# Patient Record
Sex: MALE | Race: WHITE | NOT HISPANIC OR LATINO
[De-identification: names, ages, dates, MRNs, and addresses within clinical notes are randomized per-mention and may not be internally consistent; named-entity substitution may affect disease eponyms.]

---

## 2017-01-03 ENCOUNTER — APPOINTMENT (OUTPATIENT)
Dept: RHEUMATOLOGY | Facility: CLINIC | Age: 62
End: 2017-01-03

## 2017-01-03 ENCOUNTER — LABORATORY RESULT (OUTPATIENT)
Age: 62
End: 2017-01-03

## 2017-01-03 VITALS
DIASTOLIC BLOOD PRESSURE: 60 MMHG | SYSTOLIC BLOOD PRESSURE: 100 MMHG | HEIGHT: 70 IN | OXYGEN SATURATION: 95 % | HEART RATE: 86 BPM | WEIGHT: 214 LBS | BODY MASS INDEX: 30.64 KG/M2

## 2017-01-05 ENCOUNTER — INPATIENT (INPATIENT)
Facility: HOSPITAL | Age: 62
LOS: 4 days | Discharge: HOME | DRG: 286 | End: 2017-01-10
Attending: INTERNAL MEDICINE | Admitting: HOSPITALIST
Payer: MEDICARE

## 2017-01-05 VITALS
SYSTOLIC BLOOD PRESSURE: 118 MMHG | OXYGEN SATURATION: 98 % | RESPIRATION RATE: 18 BRPM | HEART RATE: 82 BPM | DIASTOLIC BLOOD PRESSURE: 77 MMHG | TEMPERATURE: 98 F

## 2017-01-05 DIAGNOSIS — R07.9 CHEST PAIN, UNSPECIFIED: ICD-10-CM

## 2017-01-05 DIAGNOSIS — I20.9 ANGINA PECTORIS, UNSPECIFIED: ICD-10-CM

## 2017-01-05 DIAGNOSIS — I50.23 ACUTE ON CHRONIC SYSTOLIC (CONGESTIVE) HEART FAILURE: ICD-10-CM

## 2017-01-05 DIAGNOSIS — M06.9 RHEUMATOID ARTHRITIS, UNSPECIFIED: ICD-10-CM

## 2017-01-05 DIAGNOSIS — I51.3 INTRACARDIAC THROMBOSIS, NOT ELSEWHERE CLASSIFIED: ICD-10-CM

## 2017-01-05 DIAGNOSIS — I73.9 PERIPHERAL VASCULAR DISEASE, UNSPECIFIED: ICD-10-CM

## 2017-01-05 LAB
25(OH)D3 SERPL-MCNC: 39.8 NG/ML
ADJUSTED MITOGEN: >10 IU/ML
ADJUSTED TB AG: 0.01 IU/ML
ALBUMIN SERPL ELPH-MCNC: 4 G/DL
ALBUMIN SERPL ELPH-MCNC: 4.4 G/DL — SIGNIFICANT CHANGE UP (ref 3.3–5)
ALP BLD-CCNC: 65 U/L
ALP SERPL-CCNC: 67 U/L — SIGNIFICANT CHANGE UP (ref 40–120)
ALT FLD-CCNC: 37 U/L RC — SIGNIFICANT CHANGE UP (ref 10–45)
ALT SERPL-CCNC: 25 U/L
ANION GAP SERPL CALC-SCNC: 15 MMOL/L
ANION GAP SERPL CALC-SCNC: 15 MMOL/L — SIGNIFICANT CHANGE UP (ref 5–17)
APTT BLD: 36.2 SEC — SIGNIFICANT CHANGE UP (ref 27.5–37.4)
AST SERPL-CCNC: 23 U/L
AST SERPL-CCNC: 31 U/L — SIGNIFICANT CHANGE UP (ref 10–40)
BASOPHILS # BLD AUTO: 0 K/UL — SIGNIFICANT CHANGE UP (ref 0–0.2)
BASOPHILS # BLD AUTO: 0.19 K/UL
BASOPHILS NFR BLD AUTO: 1 % — SIGNIFICANT CHANGE UP (ref 0–2)
BASOPHILS NFR BLD AUTO: 1.8 %
BILIRUB SERPL-MCNC: 1 MG/DL
BILIRUB SERPL-MCNC: 1 MG/DL — SIGNIFICANT CHANGE UP (ref 0.2–1.2)
BLD GP AB SCN SERPL QL: NEGATIVE — SIGNIFICANT CHANGE UP
BUN SERPL-MCNC: 20 MG/DL — SIGNIFICANT CHANGE UP (ref 7–23)
BUN SERPL-MCNC: 23 MG/DL
CALCIUM SERPL-MCNC: 8.7 MG/DL — SIGNIFICANT CHANGE UP (ref 8.4–10.5)
CALCIUM SERPL-MCNC: 8.8 MG/DL
CHLORIDE SERPL-SCNC: 100 MMOL/L — SIGNIFICANT CHANGE UP (ref 96–108)
CHLORIDE SERPL-SCNC: 103 MMOL/L
CK MB BLD-MCNC: 2.1 % — SIGNIFICANT CHANGE UP (ref 0–3.5)
CK MB CFR SERPL CALC: 3.5 NG/ML — SIGNIFICANT CHANGE UP (ref 0–6.7)
CK SERPL-CCNC: 157 U/L
CK SERPL-CCNC: 168 U/L — SIGNIFICANT CHANGE UP (ref 30–200)
CO2 SERPL-SCNC: 23 MMOL/L
CO2 SERPL-SCNC: 23 MMOL/L — SIGNIFICANT CHANGE UP (ref 22–31)
CREAT SERPL-MCNC: 0.82 MG/DL — SIGNIFICANT CHANGE UP (ref 0.5–1.3)
CREAT SERPL-MCNC: 0.86 MG/DL
CRP SERPL-MCNC: 0.85 MG/DL
EOSINOPHIL # BLD AUTO: 0.1 K/UL — SIGNIFICANT CHANGE UP (ref 0–0.5)
EOSINOPHIL # BLD AUTO: 0.19 K/UL
EOSINOPHIL NFR BLD AUTO: 1 % — SIGNIFICANT CHANGE UP (ref 0–6)
EOSINOPHIL NFR BLD AUTO: 1.8 %
ERYTHROCYTE [SEDIMENTATION RATE] IN BLOOD BY WESTERGREN METHOD: 21 MM/HR
GAS PNL BLDV: SIGNIFICANT CHANGE UP
GLUCOSE SERPL-MCNC: 118 MG/DL — HIGH (ref 70–99)
GLUCOSE SERPL-MCNC: 87 MG/DL
HCT VFR BLD CALC: 40.6 % — SIGNIFICANT CHANGE UP (ref 39–50)
HCT VFR BLD CALC: 41 %
HGB BLD-MCNC: 13.4 G/DL
HGB BLD-MCNC: 13.9 G/DL — SIGNIFICANT CHANGE UP (ref 13–17)
INR BLD: 2.42 RATIO — HIGH (ref 0.88–1.16)
LYMPHOCYTES # BLD AUTO: 0.86 K/UL
LYMPHOCYTES # BLD AUTO: 11 % — LOW (ref 13–44)
LYMPHOCYTES # BLD AUTO: 2 K/UL — SIGNIFICANT CHANGE UP (ref 1–3.3)
LYMPHOCYTES NFR BLD AUTO: 8 %
M TB IFN-G BLD-IMP: NEGATIVE
MAN DIFF?: NORMAL
MCHC RBC-ENTMCNC: 29.3 PG
MCHC RBC-ENTMCNC: 31.3 PG — SIGNIFICANT CHANGE UP (ref 27–34)
MCHC RBC-ENTMCNC: 32.7 GM/DL
MCHC RBC-ENTMCNC: 34.3 GM/DL — SIGNIFICANT CHANGE UP (ref 32–36)
MCV RBC AUTO: 89.5 FL
MCV RBC AUTO: 91.4 FL — SIGNIFICANT CHANGE UP (ref 80–100)
MONOCYTES # BLD AUTO: 1.1 K/UL — HIGH (ref 0–0.9)
MONOCYTES # BLD AUTO: 1.53 K/UL
MONOCYTES NFR BLD AUTO: 14.2 %
MONOCYTES NFR BLD AUTO: 7 % — SIGNIFICANT CHANGE UP (ref 2–14)
NEUTROPHILS # BLD AUTO: 7.8 K/UL
NEUTROPHILS # BLD AUTO: SIGNIFICANT CHANGE UP (ref 1.8–7.4)
NEUTROPHILS NFR BLD AUTO: 72 % — SIGNIFICANT CHANGE UP (ref 43–77)
NEUTROPHILS NFR BLD AUTO: 72.4 %
NT-PROBNP SERPL-SCNC: 184 PG/ML — SIGNIFICANT CHANGE UP (ref 0–300)
PLATELET # BLD AUTO: 227 K/UL
PLATELET # BLD AUTO: 229 K/UL — SIGNIFICANT CHANGE UP (ref 150–400)
POTASSIUM SERPL-MCNC: 4.6 MMOL/L — SIGNIFICANT CHANGE UP (ref 3.5–5.3)
POTASSIUM SERPL-SCNC: 4.6 MMOL/L — SIGNIFICANT CHANGE UP (ref 3.5–5.3)
POTASSIUM SERPL-SCNC: 5 MMOL/L
PROT SERPL-MCNC: 6.6 G/DL
PROT SERPL-MCNC: 7 G/DL — SIGNIFICANT CHANGE UP (ref 6–8.3)
PROTHROM AB SERPL-ACNC: 26.4 SEC — HIGH (ref 10–13.1)
QUANTIFERON GOLD NIL: 0.06 IU/ML
RBC # BLD: 4.44 M/UL — SIGNIFICANT CHANGE UP (ref 4.2–5.8)
RBC # BLD: 4.58 M/UL
RBC # FLD: 14.3 % — SIGNIFICANT CHANGE UP (ref 10.3–14.5)
RBC # FLD: 16.5 %
RH IG SCN BLD-IMP: POSITIVE — SIGNIFICANT CHANGE UP
SODIUM SERPL-SCNC: 138 MMOL/L — SIGNIFICANT CHANGE UP (ref 135–145)
SODIUM SERPL-SCNC: 141 MMOL/L
TROPONIN T SERPL-MCNC: <0.01 NG/ML — SIGNIFICANT CHANGE UP (ref 0–0.06)
TROPONIN T SERPL-MCNC: <0.01 NG/ML — SIGNIFICANT CHANGE UP (ref 0–0.06)
WBC # BLD: 8.6 K/UL — SIGNIFICANT CHANGE UP (ref 3.8–10.5)
WBC # FLD AUTO: 10.77 K/UL
WBC # FLD AUTO: 8.6 K/UL — SIGNIFICANT CHANGE UP (ref 3.8–10.5)

## 2017-01-05 PROCEDURE — 99285 EMERGENCY DEPT VISIT HI MDM: CPT | Mod: 25

## 2017-01-05 PROCEDURE — 71010: CPT | Mod: 26

## 2017-01-05 PROCEDURE — 99223 1ST HOSP IP/OBS HIGH 75: CPT

## 2017-01-05 PROCEDURE — 93010 ELECTROCARDIOGRAM REPORT: CPT

## 2017-01-05 RX ORDER — SPIRONOLACTONE 25 MG/1
25 TABLET, FILM COATED ORAL DAILY
Qty: 0 | Refills: 0 | Status: DISCONTINUED | OUTPATIENT
Start: 2017-01-05 | End: 2017-01-10

## 2017-01-05 RX ORDER — DIGOXIN 250 MCG
0.12 TABLET ORAL DAILY
Qty: 0 | Refills: 0 | Status: DISCONTINUED | OUTPATIENT
Start: 2017-01-05 | End: 2017-01-10

## 2017-01-05 RX ORDER — ASPIRIN/CALCIUM CARB/MAGNESIUM 324 MG
325 TABLET ORAL ONCE
Qty: 0 | Refills: 0 | Status: COMPLETED | OUTPATIENT
Start: 2017-01-05 | End: 2017-01-05

## 2017-01-05 RX ORDER — TICAGRELOR 90 MG/1
90 TABLET ORAL
Qty: 0 | Refills: 0 | Status: DISCONTINUED | OUTPATIENT
Start: 2017-01-05 | End: 2017-01-10

## 2017-01-05 RX ORDER — SODIUM CHLORIDE 9 MG/ML
3 INJECTION INTRAMUSCULAR; INTRAVENOUS; SUBCUTANEOUS ONCE
Qty: 0 | Refills: 0 | Status: COMPLETED | OUTPATIENT
Start: 2017-01-05 | End: 2017-01-05

## 2017-01-05 RX ORDER — FOLIC ACID 0.8 MG
1 TABLET ORAL DAILY
Qty: 0 | Refills: 0 | Status: DISCONTINUED | OUTPATIENT
Start: 2017-01-05 | End: 2017-01-10

## 2017-01-05 RX ORDER — METHOTREXATE 2.5 MG/1
17.5 TABLET ORAL
Qty: 0 | Refills: 0 | Status: DISCONTINUED | OUTPATIENT
Start: 2017-01-09 | End: 2017-01-10

## 2017-01-05 RX ORDER — ROSUVASTATIN CALCIUM 5 MG/1
40 TABLET ORAL AT BEDTIME
Qty: 0 | Refills: 0 | Status: DISCONTINUED | OUTPATIENT
Start: 2017-01-05 | End: 2017-01-10

## 2017-01-05 RX ORDER — WARFARIN SODIUM 2.5 MG/1
6 TABLET ORAL ONCE
Qty: 0 | Refills: 0 | Status: COMPLETED | OUTPATIENT
Start: 2017-01-05 | End: 2017-01-05

## 2017-01-05 RX ORDER — CARVEDILOL PHOSPHATE 80 MG/1
6.25 CAPSULE, EXTENDED RELEASE ORAL EVERY 12 HOURS
Qty: 0 | Refills: 0 | Status: DISCONTINUED | OUTPATIENT
Start: 2017-01-05 | End: 2017-01-10

## 2017-01-05 RX ORDER — LISINOPRIL 2.5 MG/1
5 TABLET ORAL DAILY
Qty: 0 | Refills: 0 | Status: DISCONTINUED | OUTPATIENT
Start: 2017-01-05 | End: 2017-01-10

## 2017-01-05 RX ORDER — ISOSORBIDE MONONITRATE 60 MG/1
1 TABLET, EXTENDED RELEASE ORAL
Qty: 0 | Refills: 0 | COMMUNITY

## 2017-01-05 RX ORDER — FUROSEMIDE 40 MG
40 TABLET ORAL
Qty: 0 | Refills: 0 | Status: DISCONTINUED | OUTPATIENT
Start: 2017-01-05 | End: 2017-01-08

## 2017-01-05 RX ORDER — ISOSORBIDE MONONITRATE 60 MG/1
60 TABLET, EXTENDED RELEASE ORAL DAILY
Qty: 0 | Refills: 0 | Status: DISCONTINUED | OUTPATIENT
Start: 2017-01-05 | End: 2017-01-06

## 2017-01-05 RX ADMIN — CARVEDILOL PHOSPHATE 6.25 MILLIGRAM(S): 80 CAPSULE, EXTENDED RELEASE ORAL at 19:53

## 2017-01-05 RX ADMIN — SODIUM CHLORIDE 3 MILLILITER(S): 9 INJECTION INTRAMUSCULAR; INTRAVENOUS; SUBCUTANEOUS at 12:22

## 2017-01-05 RX ADMIN — Medication 40 MILLIGRAM(S): at 19:53

## 2017-01-05 RX ADMIN — Medication 325 MILLIGRAM(S): at 13:27

## 2017-01-05 RX ADMIN — WARFARIN SODIUM 6 MILLIGRAM(S): 2.5 TABLET ORAL at 21:42

## 2017-01-05 RX ADMIN — ROSUVASTATIN CALCIUM 40 MILLIGRAM(S): 5 TABLET ORAL at 21:42

## 2017-01-05 RX ADMIN — TICAGRELOR 90 MILLIGRAM(S): 90 TABLET ORAL at 21:42

## 2017-01-05 NOTE — ED PROVIDER NOTE - MUSCULOSKELETAL, MLM
Spine appears normal, range of motion is not limited, no muscle or joint tenderness Spine appears normal, range of motion is limited, no muscle or joint tenderness advanced changes of RA

## 2017-01-05 NOTE — ED PROVIDER NOTE - OBJECTIVE STATEMENT
61M w/ hx CHF  EF 20%, AICD, HTN, HLD, GERD, Rheumatoid arthritis (now on methotrexate), PAD/intermittent claudication, CAD and MI w/9  stents, anterior STEMI c/b cardiogenic shock req restent LAD (in-stent thrombosis) and Impella (9/11/2011), hx of LV thrombus (tx for 3 mo w/warfarin) , 4/15 pt had NSTEMI with RODRIGO to  pCX with Impella support. Pt comes o ED c.o 3 week hx of left sided chest pain which radiates to b/l arms x 3 weeks. Pian usually occurs at rest but sometimes on exertion. Pain lasts for 7-10 minutes and occurs usually form 12 am to 8 am . Pt denies any current chest pain . Pt also reports increase pedal edema x 3 weeks . Denies any n/v, diaphoresis, ab pain, brbpr/ melena, f/c/s, uri sx or any illness. Denies any recent travel /surg /immobilization .

## 2017-01-05 NOTE — ED PROVIDER NOTE - PSH
Accommodative strabismus    AICD (automatic cardioverter/defibrillator) present  EF 29%  S/P angioplasty with stent  2008 - 5 stents [facility]  2011 - LAD stent with cardiogenic shock Impella assisted  4/2015 - pLAD RODRIGO/POBA and pCx RODRIGO x 1  Testicular torsion  bilateral testes present

## 2017-01-05 NOTE — H&P ADULT. - PROBLEM SELECTOR PLAN 1
UA - patient with significant 3V dz s/p PCI with Impella support now with CP likely UA  c/w Ticagrelor, coumadin  c/w ACE, statin, spironolactone, bb  d/w cards - consult appreciated - will Increase imdur to 60mg bid  Plan for cath tomorrow UA - patient with significant 3V dz s/p PCI with Impella support now with CP likely UA  c/w Ticagrelor, coumadin  c/w ACE, statin, spironolactone, bb  d/w cards - consult appreciated - will Increase imdur to 60mg bid  Plan for cath tomorrow - d/w cards ok to continue with coumadin

## 2017-01-05 NOTE — ED PROVIDER NOTE - ATTENDING CONTRIBUTION TO CARE
I have seen and evaluated this patient with the resident.   I agree with the findings  unless other wise stated.  I have made appropriate changes in documentations where needed, After my face to face bedside evaluation, I am further  notin62 y/o M sig cardiac disease with multiple stents c/o persistent chest pain and pedal edema , orthopnea x 3 weeks.Pt is asymptomatic currently . Concern for restenosis/ACS. Plan to obtain ce, labs, xray ,ekg and admit for cardiac workup

## 2017-01-05 NOTE — H&P ADULT. - RS GEN PE MLT RESP DETAILS PC
breath sounds equal/respirations non-labored/clear to auscultation bilaterally/airway patent/good air movement

## 2017-01-05 NOTE — ED PROVIDER NOTE - PMH
CAD (coronary artery disease)    Chronic systolic congestive heart failure  EF15%-35%  GERD (gastroesophageal reflux disease)    H/O hyperlipidemia    HTN (hypertension)    Intermittent claudication    Joint pain, foot  in toes  Left ventricular thrombus    Moderate to severe pulmonary hypertension    PAD (peripheral artery disease)    Rheumatoid arthritis    Severe mitral regurgitation    STEMI (ST elevation myocardial infarction)  AWMI with cardiogenic shock 9/11/11- with emergent stent to LAD/impella at [facility]  Systolic heart failure  EF 29% via TTE 4/15 CAD (coronary artery disease)    Chronic systolic congestive heart failure  EF15%-35%  GERD (gastroesophageal reflux disease)    H/O hyperlipidemia    HTN (hypertension)    Intermittent claudication    Joint pain, foot  in toes  Left ventricular thrombus    Moderate to severe pulmonary hypertension    PAD (peripheral artery disease)    Rheumatoid arthritis    Severe mitral regurgitation    STEMI (ST elevation myocardial infarction)  AWMI with cardiogenic shock 9/11/11- with emergent stent to LAD/impella at [facility]  Systolic heart failure  EF 29% via TTE 4/15

## 2017-01-05 NOTE — ED ADULT NURSE NOTE - CHPI ED SYMPTOMS NEG
no fever/no back pain/no cough/no palpitations/no jaw pain/no chills/no nausea/no vomiting/no numbness

## 2017-01-05 NOTE — ED ADULT NURSE NOTE - OBJECTIVE STATEMENT
60 y/o male patient presents ambulatory to ED with complaint of chest pain. Patient states he has dx "angina" x 3 weeks. Per patient chest pain is associated with shortness of breath, lasting 7-10 minutes at time of episode. Patient states pain radiates to bilateral shoulders. Per patient increased edema to the bilateral lower extremities. Patient denies N/V/D, afebrile in ED, no headache, no lightheadedness/dizziness, no numbness or tingling, no abdominal pain or tenderness, no syncope, no cough, no URI. 60 y/o male patient presents ambulatory to ED with complaint of left sided chest pain. Patient states he has  "angina" x 3 weeks. Pain usually occurs at rest but sometimes with exertion, worsening between hours of 12am-8am. Per patient chest pain is associated with weakness and shortness of breath, lasting 7-10 minutes at time of episodes. Patient states pain radiates to bilateral shoulders. Per patient increased edema to the bilateral lower extremities. Patient currently does not have any chest pain in ED. Patient denies N/V/D, afebrile in ED, no headache, no lightheadedness/dizziness, no numbness or tingling, no abdominal pain or tenderness, no syncope, no cough, no URI. Patient has hx of CHF with EF 20%, AICD, HTN, HLD, GERD, RA, CAD and MI with 9 stents.

## 2017-01-05 NOTE — H&P ADULT. - ASSESSMENT
61yoM hx extensive 3V CAD s/p PCIx9, HFrEF EF 20% s/p AICD, LV thrombus on coumadin x6 months a/w CP likely UA and acute on chronic systolic heart failure

## 2017-01-05 NOTE — H&P ADULT. - PMH
CAD (coronary artery disease)    Chronic systolic congestive heart failure  EF15%-35%  GERD (gastroesophageal reflux disease)    H/O hyperlipidemia    HTN (hypertension)    Intermittent claudication    Joint pain, foot  in toes  Left ventricular thrombus    Moderate to severe pulmonary hypertension    PAD (peripheral artery disease)    Rheumatoid arthritis    Severe mitral regurgitation    STEMI (ST elevation myocardial infarction)  AWMI with cardiogenic shock 9/11/11- with emergent stent to LAD/impella at [facility]  Systolic heart failure  EF 29% via TTE 4/15

## 2017-01-05 NOTE — H&P ADULT. - HISTORY OF PRESENT ILLNESS
61M w/ hx CHF  EF 20%, AICD, HTN, HLD, GERD, Rheumatoid arthritis (now on methotrexate), PAD/intermittent claudication, CAD and MI w/9  stents, anterior STEMI c/b cardiogenic shock req restent LAD (in-stent thrombosis) and Impella (9/11/2011), hx of LV thrombus (tx for 3 mo w/warfarin) , 4/15 pt had NSTEMI with RODRIGO to  pCX with Impella support. Pt comes o ED c.o 3 week hx of left sided chest pain which radiates to b/l arms x 3 weeks. Pian usually occurs at rest but sometimes on exertion. Pain lasts for 7-10 minutes and occurs usually form 12 am to 8 am . Pt denies any current chest pain . Pt also reports increase pedal edema x 3 weeks . Denies any n/v, diaphoresis, ab pain, brbpr/ melena, f/c/s, uri sx or any illness. Denies any recent travel /surg /immobilization 61M w/ hx CHF  EF 20%, AICD, HTN, HLD, GERD, Rheumatoid arthritis (on methotrexate), PAD/intermittent claudication, CAD and MI w/9  stents, anterior STEMI c/b cardiogenic shock req restent LAD (in-stent thrombosis) and Impella (9/11/2011), hx of LV thrombus (tx for 6 mo w/warfarin since January) , 4/15 pt had NSTEMI with RODRIGO to pCX with Impella support. Pt comes to ED c.o 3 week hx of left sided chest pain which radiates to b/l arms x 3 weeks. Pain usually occurs at rest but sometimes on exertion. Pain lasts for 7-10 minutes and occurs usually at night when laying down. Pain ranges from 3-10 and subsides after a few minutes. Pt denies any current chest pain. Pt also reports increase pedal edema x3 weeks. Patient has been unable to sleep in the bed for 3 weeks only sleeping in a chair due to sob/cp. Denies any n/v, diaphoresis, abd pain, brbpr/ melena, f/c/s, uri sx or any illness. Denies any recent travel/surg/immobilization.   Patient is able to ambulate 1/2 mile per day and at night 1/4 mile per day.  Patient lives on his own.

## 2017-01-05 NOTE — ED PROVIDER NOTE - MEDICAL DECISION MAKING DETAILS
62 y/o M sig cardiac disease with multiple stents c/o persistent chest pain and pedal edema , orthopnea x 3 weeks.Pt is asymptomatic currently . Concern for restenosis/ACS. Plan to obtain ce, labs, xray ,ekg and admit for cardiac workup

## 2017-01-05 NOTE — ED ADULT NURSE NOTE - PMH
CAD (coronary artery disease)    Chronic systolic congestive heart failure  EF15%-35%  GERD (gastroesophageal reflux disease)    H/O hyperlipidemia    HTN (hypertension)    Intermittent claudication    Joint pain, foot  in toes  Left ventricular thrombus    Moderate to severe pulmonary hypertension    PAD (peripheral artery disease)    Rheumatoid arthritis    Severe mitral regurgitation    STEMI (ST elevation myocardial infarction)  AWMI with cardiogenic shock 9/11/11- with emergent stent to LAD/impella at [facility]  Systolic heart failure  EF 29% via TTE 4/15

## 2017-01-06 LAB
ANION GAP SERPL CALC-SCNC: 17 MMOL/L — SIGNIFICANT CHANGE UP (ref 5–17)
APTT BLD: 36.3 SEC — SIGNIFICANT CHANGE UP (ref 27.5–37.4)
BUN SERPL-MCNC: 17 MG/DL — SIGNIFICANT CHANGE UP (ref 7–23)
CALCIUM SERPL-MCNC: 9 MG/DL — SIGNIFICANT CHANGE UP (ref 8.4–10.5)
CHLORIDE SERPL-SCNC: 100 MMOL/L — SIGNIFICANT CHANGE UP (ref 96–108)
CO2 SERPL-SCNC: 21 MMOL/L — LOW (ref 22–31)
CREAT SERPL-MCNC: 0.8 MG/DL — SIGNIFICANT CHANGE UP (ref 0.5–1.3)
GLUCOSE SERPL-MCNC: 101 MG/DL — HIGH (ref 70–99)
HCT VFR BLD CALC: 40.6 % — SIGNIFICANT CHANGE UP (ref 39–50)
HGB BLD-MCNC: 13.4 G/DL — SIGNIFICANT CHANGE UP (ref 13–17)
INR BLD: 2.47 RATIO — HIGH (ref 0.88–1.16)
MCHC RBC-ENTMCNC: 29.2 PG — SIGNIFICANT CHANGE UP (ref 27–34)
MCHC RBC-ENTMCNC: 33 GM/DL — SIGNIFICANT CHANGE UP (ref 32–36)
MCV RBC AUTO: 88.5 FL — SIGNIFICANT CHANGE UP (ref 80–100)
PLATELET # BLD AUTO: 259 K/UL — SIGNIFICANT CHANGE UP (ref 150–400)
POTASSIUM SERPL-MCNC: 4.5 MMOL/L — SIGNIFICANT CHANGE UP (ref 3.5–5.3)
POTASSIUM SERPL-SCNC: 4.5 MMOL/L — SIGNIFICANT CHANGE UP (ref 3.5–5.3)
PROTHROM AB SERPL-ACNC: 28.2 SEC — HIGH (ref 10–13.1)
RBC # BLD: 4.59 M/UL — SIGNIFICANT CHANGE UP (ref 4.2–5.8)
RBC # FLD: 15.9 % — HIGH (ref 10.3–14.5)
SODIUM SERPL-SCNC: 138 MMOL/L — SIGNIFICANT CHANGE UP (ref 135–145)
WBC # BLD: 10.83 K/UL — HIGH (ref 3.8–10.5)
WBC # FLD AUTO: 10.83 K/UL — HIGH (ref 3.8–10.5)

## 2017-01-06 PROCEDURE — 99233 SBSQ HOSP IP/OBS HIGH 50: CPT

## 2017-01-06 PROCEDURE — 93306 TTE W/DOPPLER COMPLETE: CPT | Mod: 26

## 2017-01-06 PROCEDURE — 93971 EXTREMITY STUDY: CPT | Mod: 26

## 2017-01-06 RX ORDER — ISOSORBIDE MONONITRATE 60 MG/1
60 TABLET, EXTENDED RELEASE ORAL DAILY
Qty: 0 | Refills: 0 | Status: DISCONTINUED | OUTPATIENT
Start: 2017-01-06 | End: 2017-01-10

## 2017-01-06 RX ADMIN — Medication 40 MILLIGRAM(S): at 22:27

## 2017-01-06 RX ADMIN — TICAGRELOR 90 MILLIGRAM(S): 90 TABLET ORAL at 17:38

## 2017-01-06 RX ADMIN — ROSUVASTATIN CALCIUM 40 MILLIGRAM(S): 5 TABLET ORAL at 22:27

## 2017-01-06 RX ADMIN — CARVEDILOL PHOSPHATE 6.25 MILLIGRAM(S): 80 CAPSULE, EXTENDED RELEASE ORAL at 17:38

## 2017-01-06 RX ADMIN — CARVEDILOL PHOSPHATE 6.25 MILLIGRAM(S): 80 CAPSULE, EXTENDED RELEASE ORAL at 06:03

## 2017-01-06 RX ADMIN — TICAGRELOR 90 MILLIGRAM(S): 90 TABLET ORAL at 06:03

## 2017-01-06 RX ADMIN — ISOSORBIDE MONONITRATE 60 MILLIGRAM(S): 60 TABLET, EXTENDED RELEASE ORAL at 14:23

## 2017-01-06 RX ADMIN — LISINOPRIL 5 MILLIGRAM(S): 2.5 TABLET ORAL at 11:43

## 2017-01-06 RX ADMIN — SPIRONOLACTONE 25 MILLIGRAM(S): 25 TABLET, FILM COATED ORAL at 11:44

## 2017-01-06 RX ADMIN — Medication 1 MILLIGRAM(S): at 11:44

## 2017-01-06 RX ADMIN — Medication 40 MILLIGRAM(S): at 08:54

## 2017-01-06 RX ADMIN — Medication 0.12 MILLIGRAM(S): at 06:03

## 2017-01-07 LAB
ALBUMIN SERPL ELPH-MCNC: 4.2 G/DL — SIGNIFICANT CHANGE UP (ref 3.3–5)
ALP SERPL-CCNC: 70 U/L — SIGNIFICANT CHANGE UP (ref 40–120)
ALT FLD-CCNC: 31 U/L — SIGNIFICANT CHANGE UP (ref 10–45)
ANION GAP SERPL CALC-SCNC: 20 MMOL/L — HIGH (ref 5–17)
APTT BLD: 32.6 SEC — SIGNIFICANT CHANGE UP (ref 27.5–37.4)
APTT BLD: 99.3 SEC — HIGH (ref 27.5–37.4)
AST SERPL-CCNC: 30 U/L — SIGNIFICANT CHANGE UP (ref 10–40)
BILIRUB SERPL-MCNC: 1 MG/DL — SIGNIFICANT CHANGE UP (ref 0.2–1.2)
BUN SERPL-MCNC: 21 MG/DL — SIGNIFICANT CHANGE UP (ref 7–23)
CALCIUM SERPL-MCNC: 9.3 MG/DL — SIGNIFICANT CHANGE UP (ref 8.4–10.5)
CHLORIDE SERPL-SCNC: 93 MMOL/L — LOW (ref 96–108)
CHOLEST SERPL-MCNC: 111 MG/DL — SIGNIFICANT CHANGE UP (ref 10–199)
CO2 SERPL-SCNC: 23 MMOL/L — SIGNIFICANT CHANGE UP (ref 22–31)
CREAT SERPL-MCNC: 0.83 MG/DL — SIGNIFICANT CHANGE UP (ref 0.5–1.3)
GLUCOSE SERPL-MCNC: 103 MG/DL — HIGH (ref 70–99)
HCT VFR BLD CALC: 44.7 % — SIGNIFICANT CHANGE UP (ref 39–50)
HDLC SERPL-MCNC: 46 MG/DL — SIGNIFICANT CHANGE UP (ref 40–125)
HGB BLD-MCNC: 14.9 G/DL — SIGNIFICANT CHANGE UP (ref 13–17)
INR BLD: 1.99 RATIO — HIGH (ref 0.88–1.16)
LIPID PNL WITH DIRECT LDL SERPL: 51 MG/DL — SIGNIFICANT CHANGE UP
MAGNESIUM SERPL-MCNC: 2.2 MG/DL — SIGNIFICANT CHANGE UP (ref 1.6–2.6)
MCHC RBC-ENTMCNC: 30.7 PG — SIGNIFICANT CHANGE UP (ref 27–34)
MCHC RBC-ENTMCNC: 33.3 GM/DL — SIGNIFICANT CHANGE UP (ref 32–36)
MCV RBC AUTO: 92 FL — SIGNIFICANT CHANGE UP (ref 80–100)
PHOSPHATE SERPL-MCNC: 3.5 MG/DL — SIGNIFICANT CHANGE UP (ref 2.5–4.5)
PLATELET # BLD AUTO: 238 K/UL — SIGNIFICANT CHANGE UP (ref 150–400)
POTASSIUM SERPL-MCNC: 4.3 MMOL/L — SIGNIFICANT CHANGE UP (ref 3.5–5.3)
POTASSIUM SERPL-SCNC: 4.3 MMOL/L — SIGNIFICANT CHANGE UP (ref 3.5–5.3)
PROT SERPL-MCNC: 7.4 G/DL — SIGNIFICANT CHANGE UP (ref 6–8.3)
PROTHROM AB SERPL-ACNC: 22.6 SEC — HIGH (ref 10–13.1)
RBC # BLD: 4.86 M/UL — SIGNIFICANT CHANGE UP (ref 4.2–5.8)
RBC # FLD: 14.4 % — SIGNIFICANT CHANGE UP (ref 10.3–14.5)
SODIUM SERPL-SCNC: 136 MMOL/L — SIGNIFICANT CHANGE UP (ref 135–145)
TOTAL CHOLESTEROL/HDL RATIO MEASUREMENT: 2.4 RATIO — LOW (ref 3.4–9.6)
TRIGL SERPL-MCNC: 71 MG/DL — SIGNIFICANT CHANGE UP (ref 10–149)
TSH SERPL-MCNC: 1.04 UU/ML — SIGNIFICANT CHANGE UP (ref 0.27–4.2)
WBC # BLD: 12.8 K/UL — HIGH (ref 3.8–10.5)
WBC # FLD AUTO: 12.8 K/UL — HIGH (ref 3.8–10.5)

## 2017-01-07 PROCEDURE — 99233 SBSQ HOSP IP/OBS HIGH 50: CPT

## 2017-01-07 RX ORDER — HEPARIN SODIUM 5000 [USP'U]/ML
4000 INJECTION INTRAVENOUS; SUBCUTANEOUS EVERY 6 HOURS
Qty: 0 | Refills: 0 | Status: DISCONTINUED | OUTPATIENT
Start: 2017-01-07 | End: 2017-01-09

## 2017-01-07 RX ORDER — ASPIRIN/CALCIUM CARB/MAGNESIUM 324 MG
81 TABLET ORAL DAILY
Qty: 0 | Refills: 0 | Status: DISCONTINUED | OUTPATIENT
Start: 2017-01-07 | End: 2017-01-10

## 2017-01-07 RX ORDER — HEPARIN SODIUM 5000 [USP'U]/ML
INJECTION INTRAVENOUS; SUBCUTANEOUS
Qty: 25000 | Refills: 0 | Status: DISCONTINUED | OUTPATIENT
Start: 2017-01-07 | End: 2017-01-09

## 2017-01-07 RX ORDER — HEPARIN SODIUM 5000 [USP'U]/ML
8000 INJECTION INTRAVENOUS; SUBCUTANEOUS EVERY 6 HOURS
Qty: 0 | Refills: 0 | Status: DISCONTINUED | OUTPATIENT
Start: 2017-01-07 | End: 2017-01-09

## 2017-01-07 RX ADMIN — CARVEDILOL PHOSPHATE 6.25 MILLIGRAM(S): 80 CAPSULE, EXTENDED RELEASE ORAL at 18:34

## 2017-01-07 RX ADMIN — LISINOPRIL 5 MILLIGRAM(S): 2.5 TABLET ORAL at 05:33

## 2017-01-07 RX ADMIN — TICAGRELOR 90 MILLIGRAM(S): 90 TABLET ORAL at 18:34

## 2017-01-07 RX ADMIN — HEPARIN SODIUM 1800 UNIT(S)/HR: 5000 INJECTION INTRAVENOUS; SUBCUTANEOUS at 23:26

## 2017-01-07 RX ADMIN — Medication 40 MILLIGRAM(S): at 22:32

## 2017-01-07 RX ADMIN — Medication 1 MILLIGRAM(S): at 12:40

## 2017-01-07 RX ADMIN — Medication 40 MILLIGRAM(S): at 08:50

## 2017-01-07 RX ADMIN — Medication 81 MILLIGRAM(S): at 18:35

## 2017-01-07 RX ADMIN — SPIRONOLACTONE 25 MILLIGRAM(S): 25 TABLET, FILM COATED ORAL at 08:50

## 2017-01-07 RX ADMIN — TICAGRELOR 90 MILLIGRAM(S): 90 TABLET ORAL at 05:33

## 2017-01-07 RX ADMIN — Medication 0.12 MILLIGRAM(S): at 05:33

## 2017-01-07 RX ADMIN — HEPARIN SODIUM 1800 UNIT(S)/HR: 5000 INJECTION INTRAVENOUS; SUBCUTANEOUS at 16:51

## 2017-01-07 RX ADMIN — ISOSORBIDE MONONITRATE 60 MILLIGRAM(S): 60 TABLET, EXTENDED RELEASE ORAL at 12:40

## 2017-01-07 RX ADMIN — ROSUVASTATIN CALCIUM 40 MILLIGRAM(S): 5 TABLET ORAL at 22:32

## 2017-01-07 RX ADMIN — CARVEDILOL PHOSPHATE 6.25 MILLIGRAM(S): 80 CAPSULE, EXTENDED RELEASE ORAL at 05:33

## 2017-01-08 LAB
ANION GAP SERPL CALC-SCNC: 20 MMOL/L — HIGH (ref 5–17)
APTT BLD: 171.1 SEC — CRITICAL HIGH (ref 27.5–37.4)
APTT BLD: 92.2 SEC — HIGH (ref 27.5–37.4)
APTT BLD: 98.6 SEC — HIGH (ref 27.5–37.4)
BUN SERPL-MCNC: 25 MG/DL — HIGH (ref 7–23)
CALCIUM SERPL-MCNC: 9.5 MG/DL — SIGNIFICANT CHANGE UP (ref 8.4–10.5)
CHLORIDE SERPL-SCNC: 97 MMOL/L — SIGNIFICANT CHANGE UP (ref 96–108)
CO2 SERPL-SCNC: 20 MMOL/L — LOW (ref 22–31)
CREAT SERPL-MCNC: 0.88 MG/DL — SIGNIFICANT CHANGE UP (ref 0.5–1.3)
GLUCOSE SERPL-MCNC: 119 MG/DL — HIGH (ref 70–99)
HCT VFR BLD CALC: 43.9 % — SIGNIFICANT CHANGE UP (ref 39–50)
HGB BLD-MCNC: 14.9 G/DL — SIGNIFICANT CHANGE UP (ref 13–17)
INR BLD: 1.54 RATIO — HIGH (ref 0.88–1.16)
MCHC RBC-ENTMCNC: 29.7 PG — SIGNIFICANT CHANGE UP (ref 27–34)
MCHC RBC-ENTMCNC: 33.9 GM/DL — SIGNIFICANT CHANGE UP (ref 32–36)
MCV RBC AUTO: 87.6 FL — SIGNIFICANT CHANGE UP (ref 80–100)
PLATELET # BLD AUTO: 254 K/UL — SIGNIFICANT CHANGE UP (ref 150–400)
POTASSIUM SERPL-MCNC: 4.1 MMOL/L — SIGNIFICANT CHANGE UP (ref 3.5–5.3)
POTASSIUM SERPL-SCNC: 4.1 MMOL/L — SIGNIFICANT CHANGE UP (ref 3.5–5.3)
PROTHROM AB SERPL-ACNC: 17.5 SEC — HIGH (ref 10–13.1)
RBC # BLD: 5.01 M/UL — SIGNIFICANT CHANGE UP (ref 4.2–5.8)
RBC # FLD: 15.4 % — HIGH (ref 10.3–14.5)
SODIUM SERPL-SCNC: 137 MMOL/L — SIGNIFICANT CHANGE UP (ref 135–145)
WBC # BLD: 10.4 K/UL — SIGNIFICANT CHANGE UP (ref 3.8–10.5)
WBC # FLD AUTO: 10.4 K/UL — SIGNIFICANT CHANGE UP (ref 3.8–10.5)

## 2017-01-08 PROCEDURE — 99233 SBSQ HOSP IP/OBS HIGH 50: CPT

## 2017-01-08 RX ORDER — FUROSEMIDE 40 MG
40 TABLET ORAL DAILY
Qty: 0 | Refills: 0 | Status: DISCONTINUED | OUTPATIENT
Start: 2017-01-09 | End: 2017-01-10

## 2017-01-08 RX ADMIN — HEPARIN SODIUM 0 UNIT(S)/HR: 5000 INJECTION INTRAVENOUS; SUBCUTANEOUS at 06:06

## 2017-01-08 RX ADMIN — HEPARIN SODIUM 1500 UNIT(S)/HR: 5000 INJECTION INTRAVENOUS; SUBCUTANEOUS at 21:24

## 2017-01-08 RX ADMIN — CARVEDILOL PHOSPHATE 6.25 MILLIGRAM(S): 80 CAPSULE, EXTENDED RELEASE ORAL at 17:39

## 2017-01-08 RX ADMIN — CARVEDILOL PHOSPHATE 6.25 MILLIGRAM(S): 80 CAPSULE, EXTENDED RELEASE ORAL at 05:14

## 2017-01-08 RX ADMIN — TICAGRELOR 90 MILLIGRAM(S): 90 TABLET ORAL at 05:14

## 2017-01-08 RX ADMIN — Medication 1 MILLIGRAM(S): at 13:16

## 2017-01-08 RX ADMIN — SPIRONOLACTONE 25 MILLIGRAM(S): 25 TABLET, FILM COATED ORAL at 09:05

## 2017-01-08 RX ADMIN — Medication 0.12 MILLIGRAM(S): at 05:14

## 2017-01-08 RX ADMIN — Medication 81 MILLIGRAM(S): at 13:16

## 2017-01-08 RX ADMIN — Medication 40 MILLIGRAM(S): at 09:06

## 2017-01-08 RX ADMIN — LISINOPRIL 5 MILLIGRAM(S): 2.5 TABLET ORAL at 09:06

## 2017-01-08 RX ADMIN — HEPARIN SODIUM 1500 UNIT(S)/HR: 5000 INJECTION INTRAVENOUS; SUBCUTANEOUS at 14:16

## 2017-01-08 RX ADMIN — TICAGRELOR 90 MILLIGRAM(S): 90 TABLET ORAL at 17:39

## 2017-01-08 RX ADMIN — HEPARIN SODIUM 1500 UNIT(S)/HR: 5000 INJECTION INTRAVENOUS; SUBCUTANEOUS at 07:11

## 2017-01-08 RX ADMIN — ROSUVASTATIN CALCIUM 40 MILLIGRAM(S): 5 TABLET ORAL at 21:25

## 2017-01-08 RX ADMIN — ISOSORBIDE MONONITRATE 60 MILLIGRAM(S): 60 TABLET, EXTENDED RELEASE ORAL at 13:16

## 2017-01-08 NOTE — PROVIDER CONTACT NOTE (OTHER) - ASSESSMENT
Pt A&Ox4. Denies n/v, dizziness. Pt denies cp/sob. BP @ 2035 92/55. BP 94/58 @ 2103  Lung sounds clear.

## 2017-01-09 LAB
ANION GAP SERPL CALC-SCNC: 15 MMOL/L — SIGNIFICANT CHANGE UP (ref 5–17)
APTT BLD: 82 SEC — HIGH (ref 27.5–37.4)
BUN SERPL-MCNC: 26 MG/DL — HIGH (ref 7–23)
CALCIUM SERPL-MCNC: 9.4 MG/DL — SIGNIFICANT CHANGE UP (ref 8.4–10.5)
CHLORIDE SERPL-SCNC: 94 MMOL/L — LOW (ref 96–108)
CO2 SERPL-SCNC: 23 MMOL/L — SIGNIFICANT CHANGE UP (ref 22–31)
CREAT SERPL-MCNC: 0.83 MG/DL — SIGNIFICANT CHANGE UP (ref 0.5–1.3)
GLUCOSE SERPL-MCNC: 96 MG/DL — SIGNIFICANT CHANGE UP (ref 70–99)
HCT VFR BLD CALC: 42.5 % — SIGNIFICANT CHANGE UP (ref 39–50)
HGB BLD-MCNC: 14.8 G/DL — SIGNIFICANT CHANGE UP (ref 13–17)
INR BLD: 1.17 RATIO — HIGH (ref 0.88–1.16)
MCHC RBC-ENTMCNC: 30.2 PG — SIGNIFICANT CHANGE UP (ref 27–34)
MCHC RBC-ENTMCNC: 34.8 GM/DL — SIGNIFICANT CHANGE UP (ref 32–36)
MCV RBC AUTO: 86.7 FL — SIGNIFICANT CHANGE UP (ref 80–100)
PLATELET # BLD AUTO: 263 K/UL — SIGNIFICANT CHANGE UP (ref 150–400)
POTASSIUM SERPL-MCNC: 4 MMOL/L — SIGNIFICANT CHANGE UP (ref 3.5–5.3)
POTASSIUM SERPL-SCNC: 4 MMOL/L — SIGNIFICANT CHANGE UP (ref 3.5–5.3)
PROTHROM AB SERPL-ACNC: 13.2 SEC — HIGH (ref 10–13.1)
RBC # BLD: 4.9 M/UL — SIGNIFICANT CHANGE UP (ref 4.2–5.8)
RBC # FLD: 15.2 % — HIGH (ref 10.3–14.5)
SODIUM SERPL-SCNC: 132 MMOL/L — LOW (ref 135–145)
WBC # BLD: 9.62 K/UL — SIGNIFICANT CHANGE UP (ref 3.8–10.5)
WBC # FLD AUTO: 9.62 K/UL — SIGNIFICANT CHANGE UP (ref 3.8–10.5)

## 2017-01-09 PROCEDURE — 99232 SBSQ HOSP IP/OBS MODERATE 35: CPT | Mod: GC

## 2017-01-09 PROCEDURE — 93458 L HRT ARTERY/VENTRICLE ANGIO: CPT | Mod: 26,GC

## 2017-01-09 PROCEDURE — 99233 SBSQ HOSP IP/OBS HIGH 50: CPT | Mod: GC

## 2017-01-09 RX ORDER — HEPARIN SODIUM 5000 [USP'U]/ML
8000 INJECTION INTRAVENOUS; SUBCUTANEOUS EVERY 6 HOURS
Qty: 0 | Refills: 0 | Status: DISCONTINUED | OUTPATIENT
Start: 2017-01-09 | End: 2017-01-10

## 2017-01-09 RX ORDER — HEPARIN SODIUM 5000 [USP'U]/ML
1500 INJECTION INTRAVENOUS; SUBCUTANEOUS
Qty: 25000 | Refills: 0 | Status: DISCONTINUED | OUTPATIENT
Start: 2017-01-09 | End: 2017-01-10

## 2017-01-09 RX ORDER — WARFARIN SODIUM 2.5 MG/1
6 TABLET ORAL ONCE
Qty: 0 | Refills: 0 | Status: COMPLETED | OUTPATIENT
Start: 2017-01-09 | End: 2017-01-09

## 2017-01-09 RX ORDER — HEPARIN SODIUM 5000 [USP'U]/ML
4000 INJECTION INTRAVENOUS; SUBCUTANEOUS EVERY 6 HOURS
Qty: 0 | Refills: 0 | Status: DISCONTINUED | OUTPATIENT
Start: 2017-01-09 | End: 2017-01-10

## 2017-01-09 RX ADMIN — HEPARIN SODIUM 1500 UNIT(S)/HR: 5000 INJECTION INTRAVENOUS; SUBCUTANEOUS at 09:36

## 2017-01-09 RX ADMIN — Medication 0.12 MILLIGRAM(S): at 05:25

## 2017-01-09 RX ADMIN — METHOTREXATE 17.5 MILLIGRAM(S): 2.5 TABLET ORAL at 11:57

## 2017-01-09 RX ADMIN — ROSUVASTATIN CALCIUM 40 MILLIGRAM(S): 5 TABLET ORAL at 21:29

## 2017-01-09 RX ADMIN — HEPARIN SODIUM 1500 UNIT(S)/HR: 5000 INJECTION INTRAVENOUS; SUBCUTANEOUS at 23:15

## 2017-01-09 RX ADMIN — CARVEDILOL PHOSPHATE 6.25 MILLIGRAM(S): 80 CAPSULE, EXTENDED RELEASE ORAL at 09:36

## 2017-01-09 RX ADMIN — Medication 1 MILLIGRAM(S): at 11:59

## 2017-01-09 RX ADMIN — LISINOPRIL 5 MILLIGRAM(S): 2.5 TABLET ORAL at 11:58

## 2017-01-09 RX ADMIN — SPIRONOLACTONE 25 MILLIGRAM(S): 25 TABLET, FILM COATED ORAL at 05:25

## 2017-01-09 RX ADMIN — TICAGRELOR 90 MILLIGRAM(S): 90 TABLET ORAL at 09:35

## 2017-01-09 RX ADMIN — Medication 81 MILLIGRAM(S): at 11:58

## 2017-01-09 RX ADMIN — CARVEDILOL PHOSPHATE 6.25 MILLIGRAM(S): 80 CAPSULE, EXTENDED RELEASE ORAL at 18:37

## 2017-01-09 RX ADMIN — ISOSORBIDE MONONITRATE 60 MILLIGRAM(S): 60 TABLET, EXTENDED RELEASE ORAL at 11:58

## 2017-01-09 RX ADMIN — WARFARIN SODIUM 6 MILLIGRAM(S): 2.5 TABLET ORAL at 21:29

## 2017-01-09 RX ADMIN — TICAGRELOR 90 MILLIGRAM(S): 90 TABLET ORAL at 18:37

## 2017-01-09 RX ADMIN — Medication 40 MILLIGRAM(S): at 09:35

## 2017-01-10 ENCOUNTER — TRANSCRIPTION ENCOUNTER (OUTPATIENT)
Age: 62
End: 2017-01-10

## 2017-01-10 VITALS
HEART RATE: 71 BPM | SYSTOLIC BLOOD PRESSURE: 115 MMHG | TEMPERATURE: 97 F | DIASTOLIC BLOOD PRESSURE: 78 MMHG | RESPIRATION RATE: 18 BRPM | OXYGEN SATURATION: 95 %

## 2017-01-10 LAB
ALBUMIN SERPL ELPH-MCNC: 4.2 G/DL — SIGNIFICANT CHANGE UP (ref 3.3–5)
ALP SERPL-CCNC: 70 U/L — SIGNIFICANT CHANGE UP (ref 40–120)
ALT FLD-CCNC: 29 U/L — SIGNIFICANT CHANGE UP (ref 10–45)
ANION GAP SERPL CALC-SCNC: 16 MMOL/L — SIGNIFICANT CHANGE UP (ref 5–17)
APTT BLD: 76.5 SEC — HIGH (ref 27.5–37.4)
AST SERPL-CCNC: 36 U/L — SIGNIFICANT CHANGE UP (ref 10–40)
BILIRUB SERPL-MCNC: 1.3 MG/DL — HIGH (ref 0.2–1.2)
BUN SERPL-MCNC: 25 MG/DL — HIGH (ref 7–23)
CALCIUM SERPL-MCNC: 9.5 MG/DL — SIGNIFICANT CHANGE UP (ref 8.4–10.5)
CHLORIDE SERPL-SCNC: 93 MMOL/L — LOW (ref 96–108)
CO2 SERPL-SCNC: 22 MMOL/L — SIGNIFICANT CHANGE UP (ref 22–31)
CREAT SERPL-MCNC: 0.85 MG/DL — SIGNIFICANT CHANGE UP (ref 0.5–1.3)
GLUCOSE SERPL-MCNC: 114 MG/DL — HIGH (ref 70–99)
HCT VFR BLD CALC: 44.9 % — SIGNIFICANT CHANGE UP (ref 39–50)
HGB BLD-MCNC: 15.2 G/DL — SIGNIFICANT CHANGE UP (ref 13–17)
INR BLD: 1.12 RATIO — SIGNIFICANT CHANGE UP (ref 0.88–1.16)
MCHC RBC-ENTMCNC: 30.9 PG — SIGNIFICANT CHANGE UP (ref 27–34)
MCHC RBC-ENTMCNC: 33.9 GM/DL — SIGNIFICANT CHANGE UP (ref 32–36)
MCV RBC AUTO: 91.1 FL — SIGNIFICANT CHANGE UP (ref 80–100)
PLATELET # BLD AUTO: 232 K/UL — SIGNIFICANT CHANGE UP (ref 150–400)
POTASSIUM SERPL-MCNC: 4.5 MMOL/L — SIGNIFICANT CHANGE UP (ref 3.5–5.3)
POTASSIUM SERPL-SCNC: 4.5 MMOL/L — SIGNIFICANT CHANGE UP (ref 3.5–5.3)
PROT SERPL-MCNC: 7.4 G/DL — SIGNIFICANT CHANGE UP (ref 6–8.3)
PROTHROM AB SERPL-ACNC: 12.1 SEC — SIGNIFICANT CHANGE UP (ref 10–13.1)
RBC # BLD: 4.93 M/UL — SIGNIFICANT CHANGE UP (ref 4.2–5.8)
RBC # FLD: 13.9 % — SIGNIFICANT CHANGE UP (ref 10.3–14.5)
SODIUM SERPL-SCNC: 131 MMOL/L — LOW (ref 135–145)
WBC # BLD: 10.6 K/UL — HIGH (ref 3.8–10.5)
WBC # FLD AUTO: 10.6 K/UL — HIGH (ref 3.8–10.5)

## 2017-01-10 PROCEDURE — 85730 THROMBOPLASTIN TIME PARTIAL: CPT

## 2017-01-10 PROCEDURE — 82947 ASSAY GLUCOSE BLOOD QUANT: CPT

## 2017-01-10 PROCEDURE — 85014 HEMATOCRIT: CPT

## 2017-01-10 PROCEDURE — 80048 BASIC METABOLIC PNL TOTAL CA: CPT

## 2017-01-10 PROCEDURE — 86900 BLOOD TYPING SEROLOGIC ABO: CPT

## 2017-01-10 PROCEDURE — 86901 BLOOD TYPING SEROLOGIC RH(D): CPT

## 2017-01-10 PROCEDURE — 83880 ASSAY OF NATRIURETIC PEPTIDE: CPT

## 2017-01-10 PROCEDURE — 82550 ASSAY OF CK (CPK): CPT

## 2017-01-10 PROCEDURE — 83735 ASSAY OF MAGNESIUM: CPT

## 2017-01-10 PROCEDURE — 71045 X-RAY EXAM CHEST 1 VIEW: CPT

## 2017-01-10 PROCEDURE — C1887: CPT

## 2017-01-10 PROCEDURE — 85610 PROTHROMBIN TIME: CPT

## 2017-01-10 PROCEDURE — 99285 EMERGENCY DEPT VISIT HI MDM: CPT | Mod: 25

## 2017-01-10 PROCEDURE — 83605 ASSAY OF LACTIC ACID: CPT

## 2017-01-10 PROCEDURE — 85027 COMPLETE CBC AUTOMATED: CPT

## 2017-01-10 PROCEDURE — C1769: CPT

## 2017-01-10 PROCEDURE — 84132 ASSAY OF SERUM POTASSIUM: CPT

## 2017-01-10 PROCEDURE — 80061 LIPID PANEL: CPT

## 2017-01-10 PROCEDURE — 84295 ASSAY OF SERUM SODIUM: CPT

## 2017-01-10 PROCEDURE — 93458 L HRT ARTERY/VENTRICLE ANGIO: CPT

## 2017-01-10 PROCEDURE — 84443 ASSAY THYROID STIM HORMONE: CPT

## 2017-01-10 PROCEDURE — 93005 ELECTROCARDIOGRAM TRACING: CPT

## 2017-01-10 PROCEDURE — 82435 ASSAY OF BLOOD CHLORIDE: CPT

## 2017-01-10 PROCEDURE — 86850 RBC ANTIBODY SCREEN: CPT

## 2017-01-10 PROCEDURE — 80053 COMPREHEN METABOLIC PANEL: CPT

## 2017-01-10 PROCEDURE — C8929: CPT

## 2017-01-10 PROCEDURE — 84100 ASSAY OF PHOSPHORUS: CPT

## 2017-01-10 PROCEDURE — 82330 ASSAY OF CALCIUM: CPT

## 2017-01-10 PROCEDURE — C1894: CPT

## 2017-01-10 PROCEDURE — 82803 BLOOD GASES ANY COMBINATION: CPT

## 2017-01-10 PROCEDURE — 82553 CREATINE MB FRACTION: CPT

## 2017-01-10 PROCEDURE — 84484 ASSAY OF TROPONIN QUANT: CPT

## 2017-01-10 PROCEDURE — 93971 EXTREMITY STUDY: CPT

## 2017-01-10 RX ORDER — LISINOPRIL 2.5 MG/1
1 TABLET ORAL
Qty: 0 | Refills: 0 | COMMUNITY
Start: 2017-01-10

## 2017-01-10 RX ORDER — FUROSEMIDE 40 MG
40 TABLET ORAL DAILY
Qty: 0 | Refills: 0 | Status: DISCONTINUED | OUTPATIENT
Start: 2017-01-10 | End: 2017-01-10

## 2017-01-10 RX ORDER — FUROSEMIDE 40 MG
1 TABLET ORAL
Qty: 30 | Refills: 0 | OUTPATIENT
Start: 2017-01-10

## 2017-01-10 RX ORDER — WARFARIN SODIUM 2.5 MG/1
10 TABLET ORAL ONCE
Qty: 0 | Refills: 0 | Status: DISCONTINUED | OUTPATIENT
Start: 2017-01-10 | End: 2017-01-10

## 2017-01-10 RX ORDER — SPIRONOLACTONE 25 MG/1
1 TABLET, FILM COATED ORAL
Qty: 0 | Refills: 0 | COMMUNITY
Start: 2017-01-10

## 2017-01-10 RX ORDER — DIGOXIN 250 MCG
1 TABLET ORAL
Qty: 0 | Refills: 0 | COMMUNITY
Start: 2017-01-10

## 2017-01-10 RX ORDER — ASPIRIN/CALCIUM CARB/MAGNESIUM 324 MG
1 TABLET ORAL
Qty: 0 | Refills: 0 | COMMUNITY
Start: 2017-01-10

## 2017-01-10 RX ORDER — METHOTREXATE 2.5 MG/1
7 TABLET ORAL
Qty: 0 | Refills: 0 | COMMUNITY
Start: 2017-01-10

## 2017-01-10 RX ORDER — FOLIC ACID 0.8 MG
1 TABLET ORAL
Qty: 0 | Refills: 0 | COMMUNITY
Start: 2017-01-10

## 2017-01-10 RX ORDER — FUROSEMIDE 40 MG
1 TABLET ORAL
Qty: 0 | Refills: 0 | COMMUNITY
Start: 2017-01-10

## 2017-01-10 RX ORDER — ISOSORBIDE MONONITRATE 60 MG/1
1 TABLET, EXTENDED RELEASE ORAL
Qty: 0 | Refills: 0 | COMMUNITY
Start: 2017-01-10

## 2017-01-10 RX ORDER — ISOSORBIDE MONONITRATE 60 MG/1
30 TABLET, EXTENDED RELEASE ORAL DAILY
Qty: 0 | Refills: 0 | Status: DISCONTINUED | OUTPATIENT
Start: 2017-01-10 | End: 2017-01-10

## 2017-01-10 RX ORDER — ISOSORBIDE MONONITRATE 60 MG/1
60 TABLET, EXTENDED RELEASE ORAL DAILY
Qty: 0 | Refills: 0 | Status: DISCONTINUED | OUTPATIENT
Start: 2017-01-10 | End: 2017-01-10

## 2017-01-10 RX ORDER — METHOTREXATE 2.5 MG/1
8 TABLET ORAL
Qty: 0 | Refills: 0 | COMMUNITY
Start: 2017-01-10

## 2017-01-10 RX ORDER — TICAGRELOR 90 MG/1
1 TABLET ORAL
Qty: 0 | Refills: 0 | COMMUNITY
Start: 2017-01-10

## 2017-01-10 RX ORDER — ISOSORBIDE MONONITRATE 60 MG/1
1 TABLET, EXTENDED RELEASE ORAL
Qty: 0 | Refills: 0 | COMMUNITY

## 2017-01-10 RX ORDER — ROSUVASTATIN CALCIUM 5 MG/1
1 TABLET ORAL
Qty: 0 | Refills: 0 | COMMUNITY
Start: 2017-01-10

## 2017-01-10 RX ORDER — METHOTREXATE 2.5 MG/1
7 TABLET ORAL
Qty: 0 | Refills: 0 | COMMUNITY

## 2017-01-10 RX ORDER — CARVEDILOL PHOSPHATE 80 MG/1
1 CAPSULE, EXTENDED RELEASE ORAL
Qty: 0 | Refills: 0 | COMMUNITY
Start: 2017-01-10

## 2017-01-10 RX ADMIN — TICAGRELOR 90 MILLIGRAM(S): 90 TABLET ORAL at 05:12

## 2017-01-10 RX ADMIN — HEPARIN SODIUM 1500 UNIT(S)/HR: 5000 INJECTION INTRAVENOUS; SUBCUTANEOUS at 05:41

## 2017-01-10 RX ADMIN — SPIRONOLACTONE 25 MILLIGRAM(S): 25 TABLET, FILM COATED ORAL at 05:12

## 2017-01-10 RX ADMIN — Medication 0.12 MILLIGRAM(S): at 05:12

## 2017-01-10 RX ADMIN — CARVEDILOL PHOSPHATE 6.25 MILLIGRAM(S): 80 CAPSULE, EXTENDED RELEASE ORAL at 05:12

## 2017-01-10 NOTE — DISCHARGE NOTE ADULT - SECONDARY DIAGNOSIS.
CHF (congestive heart failure), NYHA class I, acute on chronic, systolic HTN (hypertension) Left ventricular thrombosis following MI

## 2017-01-10 NOTE — DISCHARGE NOTE ADULT - PATIENT PORTAL LINK FT
“You can access the Follow[employer] Patient Portal, offered by [facility], by registering with the following website: [email or web address]”

## 2017-01-10 NOTE — DISCHARGE NOTE ADULT - CARE PROVIDER_API CALL
Jl Ferro), Cardiology; Internal Medicine; Interventional Cardiology  [address] [place] [address]  Phone: [phone]  Fax: [phone]    Dr Aguilar Hope  Phone: (   )    -  Fax: (   )    -

## 2017-01-10 NOTE — DISCHARGE NOTE ADULT - CARE PROVIDERS DIRECT ADDRESSES
[email or web address],DirectAddress_Unknown

## 2017-01-10 NOTE — DISCHARGE NOTE ADULT - HOSPITAL COURSE
attending to complete 61M w/ hx CHF  EF 20%, AICD, HTN, HLD, GERD, Rheumatoid arthritis (on methotrexate), PAD/intermittent claudication, CAD and MI w/9  stents, anterior STEMI c/b cardiogenic shock req restent LAD (in-stent thrombosis) and Impella (9/11/2011), hx of LV thrombus (tx for 6 mo w/warfarin since January) , 4/15 pt had NSTEMI with RODRIGO to pCX with Impella support. Pt comes to ED c.o 3 week hx of left sided chest pain which radiates to b/l arms x 3 weeks. Pain usually occurs at rest but sometimes on exertion. Pain lasts for 7-10 minutes and occurs usually at night when laying down. Pain ranges from 3-10 and subsides after a few minutes. \. Pt also reports increase pedal edema x3 weeks. Patient has been unable to sleep in the bed for 3 weeks only sleeping in a chair due to sob/cp. Denies any n/v, diaphoresis, abd pain, brbpr/ melena, f/c/s, uri sx or any illness. Denies any recent travel/surg/immobilization.   Patient was admitted for acute on chronic systolic heart failure. he recieved iv diuretics while inpatient and was transityion to oral lasix and maintained euvolemia. he was taken off comuadin and bridged with heparin for preparation of cardiac catherization. Cardiac catherization showed no active lesions. as per cardiology, no need to bridge back to coumadin. he will be discharged home in stable condition.

## 2017-01-10 NOTE — DISCHARGE NOTE ADULT - CARE PLAN
Principal Discharge DX:	CAD (coronary artery disease)  Instructions for follow-up, activity and diet:	Coronary artery disease is a condition where the arteries the supply the heart muscle get clogges with fatty deposits & puts you at risk for a heart attack  Call your doctor if you have any new pain, pressure, or discomfort in the center of your chest, pain, tingling or discomfort in arms, back, neck, jaw, or stomach, shortness of breath, nausea, vomiting, burping or heartburn, sweating, cold and clammy skin, racing or abnormal heartbeat for more than 10 minutes or if they keep coming & going.  Call 911 and do not tr to get to hospital by care  You can help yourself with lefestyle changes (quitting smoking if you smoke), eat lots of fruits & vegetables & low fat dairy products, not a lot of meat & fatty foods, walk or some form of physical activity most days of the week, lose weight if you are overweight  Take your cardiac medication as prescribed to lower cholesterol, to lower blood pressure, aspirin to prevent blood clots, and diabetes control  Make sure to keep appointments with doctor for cardiac follow up care  Secondary Diagnosis:	CHF (congestive heart failure), NYHA class I, acute on chronic, systolic  Instructions for follow-up, activity and diet:	Weigh yourself daily.  If you gain 3lbs in 3 days, or 5lbs in a week call your Health Care Provider.  Do not eat or drink foods containing more than 2000mg of salt (sodium) in your diet every day.  Call your Health Care Provider if you have any swelling or increased swelling in your feet, ankles, and/or stomach.  The Pt was provided with CHF diet instruction (low sodium diet, daily weights, label reading, Heart Healthy Cooking Tips & Heart Healthy shopping Tips).  Take all of your medication as directed.  If you become dizzy call your Health Care Provider.  Secondary Diagnosis:	HTN (hypertension)  Instructions for follow-up, activity and diet:	Low salt diet  Activity as tolerated.  Take all medication as prescribed.  Follow up with your medical doctor for routine blood pressure monitoring at your next visit.  Notify your doctor if you have any of the following symptoms:   Dizziness, Lightheadedness, Blurry vision, Headache, Chest pain, Shortness of breath  Secondary Diagnosis:	Left ventricular thrombosis following MI  Instructions for follow-up, activity and diet:	Resume your home dose of Coumadin, 6 mg.  You need to have your Coumadin levels checked on Monday 1/16/17. Principal Discharge DX:	CAD (coronary artery disease)  Goal:	Follow up with Dr Ferro in one week, call to make an appointment.  Instructions for follow-up, activity and diet:	Coronary artery disease is a condition where the arteries the supply the heart muscle get clogges with fatty deposits & puts you at risk for a heart attack  Call your doctor if you have any new pain, pressure, or discomfort in the center of your chest, pain, tingling or discomfort in arms, back, neck, jaw, or stomach, shortness of breath, nausea, vomiting, burping or heartburn, sweating, cold and clammy skin, racing or abnormal heartbeat for more than 10 minutes or if they keep coming & going.  Call 911 and do not tr to get to hospital by care  You can help yourself with lefestyle changes (quitting smoking if you smoke), eat lots of fruits & vegetables & low fat dairy products, not a lot of meat & fatty foods, walk or some form of physical activity most days of the week, lose weight if you are overweight  Take your cardiac medication as prescribed to lower cholesterol, to lower blood pressure, aspirin to prevent blood clots, and diabetes control  Make sure to keep appointments with doctor for cardiac follow up care  Secondary Diagnosis:	CHF (congestive heart failure), NYHA class I, acute on chronic, systolic  Instructions for follow-up, activity and diet:	Weigh yourself daily.  If you gain 3lbs in 3 days, or 5lbs in a week call your Health Care Provider.  Do not eat or drink foods containing more than 2000mg of salt (sodium) in your diet every day.  Call your Health Care Provider if you have any swelling or increased swelling in your feet, ankles, and/or stomach.  The Pt was provided with CHF diet instruction (low sodium diet, daily weights, label reading, Heart Healthy Cooking Tips & Heart Healthy shopping Tips).  Take all of your medication as directed.  If you become dizzy call your Health Care Provider.  Secondary Diagnosis:	HTN (hypertension)  Instructions for follow-up, activity and diet:	Low salt diet  Activity as tolerated.  Take all medication as prescribed.  Follow up with your medical doctor for routine blood pressure monitoring at your next visit.  Notify your doctor if you have any of the following symptoms:   Dizziness, Lightheadedness, Blurry vision, Headache, Chest pain, Shortness of breath  Secondary Diagnosis:	Left ventricular thrombosis following MI  Instructions for follow-up, activity and diet:	Resume your home dose of Coumadin, 6 mg.  You need to have your Coumadin levels checked on Monday 1/16/17.

## 2017-01-10 NOTE — DISCHARGE NOTE ADULT - PLAN OF CARE
Coronary artery disease is a condition where the arteries the supply the heart muscle get clogges with fatty deposits & puts you at risk for a heart attack  Call your doctor if you have any new pain, pressure, or discomfort in the center of your chest, pain, tingling or discomfort in arms, back, neck, jaw, or stomach, shortness of breath, nausea, vomiting, burping or heartburn, sweating, cold and clammy skin, racing or abnormal heartbeat for more than 10 minutes or if they keep coming & going.  Call 911 and do not tr to get to hospital by care  You can help yourself with lefestyle changes (quitting smoking if you smoke), eat lots of fruits & vegetables & low fat dairy products, not a lot of meat & fatty foods, walk or some form of physical activity most days of the week, lose weight if you are overweight  Take your cardiac medication as prescribed to lower cholesterol, to lower blood pressure, aspirin to prevent blood clots, and diabetes control  Make sure to keep appointments with doctor for cardiac follow up care Weigh yourself daily.  If you gain 3lbs in 3 days, or 5lbs in a week call your Health Care Provider.  Do not eat or drink foods containing more than 2000mg of salt (sodium) in your diet every day.  Call your Health Care Provider if you have any swelling or increased swelling in your feet, ankles, and/or stomach.  The Pt was provided with CHF diet instruction (low sodium diet, daily weights, label reading, Heart Healthy Cooking Tips & Heart Healthy shopping Tips).  Take all of your medication as directed.  If you become dizzy call your Health Care Provider. Low salt diet  Activity as tolerated.  Take all medication as prescribed.  Follow up with your medical doctor for routine blood pressure monitoring at your next visit.  Notify your doctor if you have any of the following symptoms:   Dizziness, Lightheadedness, Blurry vision, Headache, Chest pain, Shortness of breath Resume your home dose of Coumadin, 6 mg.  You need to have your Coumadin levels checked on Monday 1/16/17. Follow up with Dr Ferro in one week, call to make an appointment.

## 2017-01-10 NOTE — DISCHARGE NOTE ADULT - MEDICATION SUMMARY - MEDICATIONS TO TAKE
I will START or STAY ON the medications listed below when I get home from the hospital:    spironolactone 25 mg oral tablet  -- 1 tab(s) by mouth once a day  -- Indication: For High blood pressure    aspirin 81 mg oral delayed release tablet  -- 1 tab(s) by mouth once a day  -- Indication: For Heart    lisinopril 5 mg oral tablet  -- 1 tab(s) by mouth once a day  -- Indication: For High blood pressure    isosorbide mononitrate 60 mg oral tablet, extended release  -- 1 tab(s) by mouth once a day  -- Indication: For Heart    digoxin 125 mcg (0.125 mg) oral tablet  -- 1 tab(s) by mouth once a day  -- Indication: For Heart    warfarin 6 mg oral tablet  -- 1 tab(s) by mouth once a day MDD:1  -- Indication: For LV Thrombus    rosuvastatin 40 mg oral tablet  -- 1 tab(s) by mouth once a day (at bedtime)  -- Indication: For High cholesterol    methotrexate 2.5 mg oral tablet  -- 7 tab(s) by mouth once a week every Monday  -- Indication: For Antineoplastic    ticagrelor 90 mg oral tablet  -- 1 tab(s) by mouth 2 times a day  -- Indication: For Heart    carvedilol 6.25 mg oral tablet  -- 1 tab(s) by mouth every 12 hours  -- Indication: For High blood pressure    furosemide 40 mg oral tablet  -- 1 tab(s) by mouth once a day  -- Indication: For Congestive heart failure    folic acid 1 mg oral tablet  -- 1 tab(s) by mouth once a day  -- Indication: For Supplement I will START or STAY ON the medications listed below when I get home from the hospital:    spironolactone 25 mg oral tablet  -- 1 tab(s) by mouth once a day  -- Indication: For High blood pressure    aspirin 81 mg oral delayed release tablet  -- 1 tab(s) by mouth once a day  -- Indication: For Heart    lisinopril 5 mg oral tablet  -- 1 tab(s) by mouth once a day  -- Indication: For High blood pressure    isosorbide mononitrate 60 mg oral tablet, extended release  -- 1 tab(s) by mouth once a day  -- Indication: For Heart    digoxin 125 mcg (0.125 mg) oral tablet  -- 1 tab(s) by mouth once a day  -- Indication: For Heart    warfarin 6 mg oral tablet  -- 1 tab(s) by mouth once a day MDD:1  -- Indication: For LV Thrombus    rosuvastatin 40 mg oral tablet  -- 1 tab(s) by mouth once a day (at bedtime)  -- Indication: For High cholesterol    methotrexate 2.5 mg oral tablet  -- 7 tab(s) by mouth once a week every Monday  -- Indication: For Antineoplastic    ticagrelor 90 mg oral tablet  -- 1 tab(s) by mouth 2 times a day  -- Indication: For Heart    carvedilol 6.25 mg oral tablet  -- 1 tab(s) by mouth every 12 hours  -- Indication: For High blood pressure    furosemide 40 mg oral tablet  -- 1 tab(s) by mouth once a day  -- Indication: For Edema    folic acid 1 mg oral tablet  -- 1 tab(s) by mouth once a day  -- Indication: For Supplement

## 2017-01-26 ENCOUNTER — LABORATORY RESULT (OUTPATIENT)
Age: 62
End: 2017-01-26

## 2017-01-31 ENCOUNTER — APPOINTMENT (OUTPATIENT)
Dept: CARDIOLOGY | Facility: CLINIC | Age: 62
End: 2017-01-31

## 2017-01-31 ENCOUNTER — NON-APPOINTMENT (OUTPATIENT)
Age: 62
End: 2017-01-31

## 2017-01-31 VITALS
WEIGHT: 217 LBS | OXYGEN SATURATION: 92 % | BODY MASS INDEX: 31.07 KG/M2 | DIASTOLIC BLOOD PRESSURE: 66 MMHG | HEIGHT: 70 IN | HEART RATE: 76 BPM | SYSTOLIC BLOOD PRESSURE: 99 MMHG

## 2017-02-16 ENCOUNTER — MEDICATION RENEWAL (OUTPATIENT)
Age: 62
End: 2017-02-16

## 2017-02-21 ENCOUNTER — MEDICATION RENEWAL (OUTPATIENT)
Age: 62
End: 2017-02-21

## 2017-03-20 LAB
INR PPP: 1.6 RATIO
PT BLD: 18.2 SEC

## 2017-03-28 ENCOUNTER — NON-APPOINTMENT (OUTPATIENT)
Age: 62
End: 2017-03-28

## 2017-03-28 ENCOUNTER — APPOINTMENT (OUTPATIENT)
Dept: ELECTROPHYSIOLOGY | Facility: CLINIC | Age: 62
End: 2017-03-28

## 2017-03-28 VITALS
WEIGHT: 205 LBS | SYSTOLIC BLOOD PRESSURE: 101 MMHG | HEART RATE: 62 BPM | BODY MASS INDEX: 29.35 KG/M2 | DIASTOLIC BLOOD PRESSURE: 59 MMHG | HEIGHT: 70 IN | OXYGEN SATURATION: 96 %

## 2017-04-05 ENCOUNTER — NON-APPOINTMENT (OUTPATIENT)
Age: 62
End: 2017-04-05

## 2017-04-05 ENCOUNTER — LABORATORY RESULT (OUTPATIENT)
Age: 62
End: 2017-04-05

## 2017-04-05 ENCOUNTER — APPOINTMENT (OUTPATIENT)
Dept: ELECTROPHYSIOLOGY | Facility: CLINIC | Age: 62
End: 2017-04-05

## 2017-04-05 VITALS
WEIGHT: 210 LBS | HEART RATE: 63 BPM | HEIGHT: 70 IN | SYSTOLIC BLOOD PRESSURE: 101 MMHG | DIASTOLIC BLOOD PRESSURE: 66 MMHG | BODY MASS INDEX: 30.06 KG/M2 | OXYGEN SATURATION: 97 %

## 2017-04-05 RX ORDER — ASPIRIN ENTERIC COATED TABLETS 81 MG 81 MG/1
81 TABLET, DELAYED RELEASE ORAL
Qty: 30 | Refills: 0 | Status: DISCONTINUED | COMMUNITY
End: 2017-04-05

## 2017-04-07 LAB
ANION GAP SERPL CALC-SCNC: 16 MMOL/L
BASOPHILS # BLD AUTO: 0.02 K/UL
BASOPHILS NFR BLD AUTO: 0.2 %
BUN SERPL-MCNC: 27 MG/DL
CALCIUM SERPL-MCNC: 9.5 MG/DL
CHLORIDE SERPL-SCNC: 93 MMOL/L
CO2 SERPL-SCNC: 24 MMOL/L
CREAT SERPL-MCNC: 1.16 MG/DL
EOSINOPHIL # BLD AUTO: 0.1 K/UL
EOSINOPHIL NFR BLD AUTO: 1.1 %
GLUCOSE SERPL-MCNC: 101 MG/DL
HCT VFR BLD CALC: 42 %
HGB BLD-MCNC: 13.8 G/DL
IMM GRANULOCYTES NFR BLD AUTO: 0.5 %
LYMPHOCYTES # BLD AUTO: 1.58 K/UL
LYMPHOCYTES NFR BLD AUTO: 17.8 %
MAGNESIUM SERPL-MCNC: 2.4 MG/DL
MAN DIFF?: NORMAL
MCHC RBC-ENTMCNC: 29.6 PG
MCHC RBC-ENTMCNC: 32.9 GM/DL
MCV RBC AUTO: 90.1 FL
MONOCYTES # BLD AUTO: 0.98 K/UL
MONOCYTES NFR BLD AUTO: 11 %
NEUTROPHILS # BLD AUTO: 6.16 K/UL
NEUTROPHILS NFR BLD AUTO: 69.4 %
PLATELET # BLD AUTO: 262 K/UL
POTASSIUM SERPL-SCNC: 4.7 MMOL/L
RBC # BLD: 4.66 M/UL
RBC # FLD: 15.7 %
SODIUM SERPL-SCNC: 133 MMOL/L
WBC # FLD AUTO: 8.88 K/UL

## 2017-04-11 ENCOUNTER — OUTPATIENT (OUTPATIENT)
Dept: OUTPATIENT SERVICES | Facility: HOSPITAL | Age: 62
LOS: 1 days | End: 2017-04-11
Payer: MEDICARE

## 2017-04-11 ENCOUNTER — APPOINTMENT (OUTPATIENT)
Dept: CARDIOLOGY | Facility: CLINIC | Age: 62
End: 2017-04-11

## 2017-04-11 ENCOUNTER — APPOINTMENT (OUTPATIENT)
Dept: CV DIAGNOSITCS | Facility: HOSPITAL | Age: 62
End: 2017-04-11

## 2017-04-11 DIAGNOSIS — I25.10 ATHEROSCLEROTIC HEART DISEASE OF NATIVE CORONARY ARTERY WITHOUT ANGINA PECTORIS: ICD-10-CM

## 2017-04-11 PROCEDURE — C8929: CPT

## 2017-04-11 PROCEDURE — 93306 TTE W/DOPPLER COMPLETE: CPT | Mod: 26

## 2017-04-13 ENCOUNTER — MEDICATION RENEWAL (OUTPATIENT)
Age: 62
End: 2017-04-13

## 2017-06-13 ENCOUNTER — APPOINTMENT (OUTPATIENT)
Dept: CARDIOLOGY | Facility: CLINIC | Age: 62
End: 2017-06-13

## 2017-06-13 VITALS
HEART RATE: 74 BPM | OXYGEN SATURATION: 93 % | HEIGHT: 70 IN | BODY MASS INDEX: 30.06 KG/M2 | WEIGHT: 210 LBS | DIASTOLIC BLOOD PRESSURE: 75 MMHG | SYSTOLIC BLOOD PRESSURE: 119 MMHG

## 2017-06-13 DIAGNOSIS — I73.9 PERIPHERAL VASCULAR DISEASE, UNSPECIFIED: ICD-10-CM

## 2017-06-13 RX ORDER — RANOLAZINE 1000 MG/1
1000 TABLET, FILM COATED, EXTENDED RELEASE ORAL
Qty: 180 | Refills: 3 | Status: DISCONTINUED | COMMUNITY
Start: 2017-03-28 | End: 2017-06-13

## 2017-06-26 ENCOUNTER — APPOINTMENT (OUTPATIENT)
Dept: INTERNAL MEDICINE | Facility: CLINIC | Age: 62
End: 2017-06-26

## 2017-06-26 VITALS
OXYGEN SATURATION: 95 % | HEART RATE: 80 BPM | HEIGHT: 70 IN | TEMPERATURE: 98.2 F | WEIGHT: 205 LBS | DIASTOLIC BLOOD PRESSURE: 59 MMHG | BODY MASS INDEX: 29.35 KG/M2 | SYSTOLIC BLOOD PRESSURE: 92 MMHG | RESPIRATION RATE: 17 BRPM

## 2017-06-26 DIAGNOSIS — K02.9 DENTAL CARIES, UNSPECIFIED: ICD-10-CM

## 2017-06-27 ENCOUNTER — APPOINTMENT (OUTPATIENT)
Dept: RHEUMATOLOGY | Facility: CLINIC | Age: 62
End: 2017-06-27

## 2017-06-27 ENCOUNTER — RX RENEWAL (OUTPATIENT)
Age: 62
End: 2017-06-27

## 2017-06-27 VITALS
WEIGHT: 209 LBS | HEART RATE: 71 BPM | SYSTOLIC BLOOD PRESSURE: 111 MMHG | TEMPERATURE: 98.1 F | OXYGEN SATURATION: 98 % | DIASTOLIC BLOOD PRESSURE: 70 MMHG | HEIGHT: 70 IN | BODY MASS INDEX: 29.92 KG/M2

## 2017-06-27 LAB
BASOPHILS # BLD AUTO: 0.03 K/UL
BASOPHILS NFR BLD AUTO: 0.3 %
EOSINOPHIL # BLD AUTO: 0.09 K/UL
EOSINOPHIL NFR BLD AUTO: 1 %
HCT VFR BLD CALC: 41.2 %
HGB BLD-MCNC: 13.4 G/DL
IMM GRANULOCYTES NFR BLD AUTO: 0.3 %
LYMPHOCYTES # BLD AUTO: 1.9 K/UL
LYMPHOCYTES NFR BLD AUTO: 20.3 %
MAN DIFF?: NORMAL
MCHC RBC-ENTMCNC: 28.2 PG
MCHC RBC-ENTMCNC: 32.5 GM/DL
MCV RBC AUTO: 86.6 FL
MONOCYTES # BLD AUTO: 1.15 K/UL
MONOCYTES NFR BLD AUTO: 12.3 %
NEUTROPHILS # BLD AUTO: 6.16 K/UL
NEUTROPHILS NFR BLD AUTO: 65.8 %
PLATELET # BLD AUTO: 298 K/UL
RBC # BLD: 4.76 M/UL
RBC # FLD: 14.8 %
WBC # FLD AUTO: 9.36 K/UL

## 2017-06-28 LAB
ALBUMIN SERPL ELPH-MCNC: 4 G/DL
ALP BLD-CCNC: 78 U/L
ALT SERPL-CCNC: 12 U/L
ANION GAP SERPL CALC-SCNC: 16 MMOL/L
AST SERPL-CCNC: 26 U/L
BILIRUB SERPL-MCNC: 0.8 MG/DL
BUN SERPL-MCNC: 18 MG/DL
CALCIUM SERPL-MCNC: 9.6 MG/DL
CHLORIDE SERPL-SCNC: 96 MMOL/L
CO2 SERPL-SCNC: 24 MMOL/L
CREAT SERPL-MCNC: 0.99 MG/DL
CRP SERPL-MCNC: 0.8 MG/DL
ERYTHROCYTE [SEDIMENTATION RATE] IN BLOOD BY WESTERGREN METHOD: 38 MM/HR
GLUCOSE SERPL-MCNC: 95 MG/DL
POTASSIUM SERPL-SCNC: 4.8 MMOL/L
PROT SERPL-MCNC: 7.5 G/DL
SODIUM SERPL-SCNC: 136 MMOL/L

## 2017-06-29 ENCOUNTER — APPOINTMENT (OUTPATIENT)
Dept: ELECTROPHYSIOLOGY | Facility: CLINIC | Age: 62
End: 2017-06-29

## 2017-07-14 LAB
INR PPP: 2.08 RATIO
INR PPP: 2.31 RATIO
PT BLD: 23.9 SEC
PT BLD: 26.6 SEC

## 2017-09-05 ENCOUNTER — APPOINTMENT (OUTPATIENT)
Dept: CARDIOLOGY | Facility: CLINIC | Age: 62
End: 2017-09-05
Payer: MEDICARE

## 2017-09-05 ENCOUNTER — NON-APPOINTMENT (OUTPATIENT)
Age: 62
End: 2017-09-05

## 2017-09-05 VITALS
HEIGHT: 70 IN | OXYGEN SATURATION: 93 % | BODY MASS INDEX: 29.35 KG/M2 | SYSTOLIC BLOOD PRESSURE: 88 MMHG | WEIGHT: 205 LBS | DIASTOLIC BLOOD PRESSURE: 57 MMHG | HEART RATE: 58 BPM

## 2017-09-05 DIAGNOSIS — K21.9 GASTRO-ESOPHAGEAL REFLUX DISEASE W/OUT ESOPHAGITIS: ICD-10-CM

## 2017-09-05 PROCEDURE — 93000 ELECTROCARDIOGRAM COMPLETE: CPT

## 2017-09-05 PROCEDURE — 99215 OFFICE O/P EST HI 40 MIN: CPT

## 2017-09-07 ENCOUNTER — APPOINTMENT (OUTPATIENT)
Dept: RHEUMATOLOGY | Facility: CLINIC | Age: 62
End: 2017-09-07
Payer: MEDICARE

## 2017-09-07 VITALS
SYSTOLIC BLOOD PRESSURE: 100 MMHG | WEIGHT: 204 LBS | DIASTOLIC BLOOD PRESSURE: 68 MMHG | TEMPERATURE: 97.2 F | OXYGEN SATURATION: 98 % | HEIGHT: 70 IN | BODY MASS INDEX: 29.2 KG/M2 | HEART RATE: 58 BPM

## 2017-09-07 DIAGNOSIS — R22.0 LOCALIZED SWELLING, MASS AND LUMP, HEAD: ICD-10-CM

## 2017-09-07 LAB
INR PPP: 3.19 RATIO
PT BLD: 36.9 SEC

## 2017-09-07 PROCEDURE — 99214 OFFICE O/P EST MOD 30 MIN: CPT

## 2017-09-08 LAB
ALBUMIN SERPL ELPH-MCNC: 4.1 G/DL
ALP BLD-CCNC: 80 U/L
ALT SERPL-CCNC: 22 U/L
ANION GAP SERPL CALC-SCNC: 14 MMOL/L
AST SERPL-CCNC: 24 U/L
BASOPHILS # BLD AUTO: 0.03 K/UL
BASOPHILS NFR BLD AUTO: 0.3 %
BILIRUB SERPL-MCNC: 1 MG/DL
BUN SERPL-MCNC: 15 MG/DL
CALCIUM SERPL-MCNC: 9 MG/DL
CHLORIDE SERPL-SCNC: 97 MMOL/L
CO2 SERPL-SCNC: 25 MMOL/L
CREAT SERPL-MCNC: 0.93 MG/DL
CRP SERPL-MCNC: 0.3 MG/DL
EOSINOPHIL # BLD AUTO: 0.11 K/UL
EOSINOPHIL NFR BLD AUTO: 1 %
ERYTHROCYTE [SEDIMENTATION RATE] IN BLOOD BY WESTERGREN METHOD: 23 MM/HR
GLUCOSE SERPL-MCNC: 90 MG/DL
HCT VFR BLD CALC: 41.7 %
HGB BLD-MCNC: 13.3 G/DL
IMM GRANULOCYTES NFR BLD AUTO: 0.3 %
LYMPHOCYTES # BLD AUTO: 1.64 K/UL
LYMPHOCYTES NFR BLD AUTO: 15.5 %
MAN DIFF?: NORMAL
MCHC RBC-ENTMCNC: 27.7 PG
MCHC RBC-ENTMCNC: 31.9 GM/DL
MCV RBC AUTO: 86.9 FL
MONOCYTES # BLD AUTO: 1.35 K/UL
MONOCYTES NFR BLD AUTO: 12.8 %
NEUTROPHILS # BLD AUTO: 7.39 K/UL
NEUTROPHILS NFR BLD AUTO: 70.1 %
PLATELET # BLD AUTO: 251 K/UL
POTASSIUM SERPL-SCNC: 4.7 MMOL/L
PROT SERPL-MCNC: 6.6 G/DL
RBC # BLD: 4.8 M/UL
RBC # FLD: 16.2 %
SODIUM SERPL-SCNC: 136 MMOL/L
WBC # FLD AUTO: 10.55 K/UL

## 2017-09-12 ENCOUNTER — APPOINTMENT (OUTPATIENT)
Dept: CARDIOLOGY | Facility: CLINIC | Age: 62
End: 2017-09-12

## 2017-09-20 ENCOUNTER — OTHER (OUTPATIENT)
Age: 62
End: 2017-09-20

## 2017-09-21 ENCOUNTER — APPOINTMENT (OUTPATIENT)
Dept: ELECTROPHYSIOLOGY | Facility: CLINIC | Age: 62
End: 2017-09-21
Payer: MEDICARE

## 2017-09-21 ENCOUNTER — OUTPATIENT (OUTPATIENT)
Dept: OUTPATIENT SERVICES | Facility: HOSPITAL | Age: 62
LOS: 1 days | End: 2017-09-21
Payer: MEDICARE

## 2017-09-21 ENCOUNTER — NON-APPOINTMENT (OUTPATIENT)
Age: 62
End: 2017-09-21

## 2017-09-21 ENCOUNTER — APPOINTMENT (OUTPATIENT)
Dept: CV DIAGNOSITCS | Facility: HOSPITAL | Age: 62
End: 2017-09-21

## 2017-09-21 VITALS — SYSTOLIC BLOOD PRESSURE: 101 MMHG | OXYGEN SATURATION: 98 % | DIASTOLIC BLOOD PRESSURE: 70 MMHG | HEART RATE: 91 BPM

## 2017-09-21 DIAGNOSIS — I25.10 ATHEROSCLEROTIC HEART DISEASE OF NATIVE CORONARY ARTERY WITHOUT ANGINA PECTORIS: ICD-10-CM

## 2017-09-21 PROCEDURE — 93306 TTE W/DOPPLER COMPLETE: CPT | Mod: 26

## 2017-09-21 PROCEDURE — C8929: CPT

## 2017-09-21 PROCEDURE — 93282 PRGRMG EVAL IMPLANTABLE DFB: CPT

## 2017-10-26 ENCOUNTER — MESSAGE (OUTPATIENT)
Age: 62
End: 2017-10-26

## 2017-11-29 NOTE — H&P ADULT. - VASCULAR
Problem: Patient Care Overview  Goal: Plan of Care Review  Outcome: Ongoing (interventions implemented as appropriate)  Pt AAOx4, independent, involved in plan of care and communicating. Wife at bedside involved in POC. VSS throughout shift. Tele monitoring continued, sinus rhythm. Tmax 99.4. No complaints of pain. Tolerating diet, no complaints of N/V. Bedtime accucheck 115, no coverage needed. Voiding w/o difficulty. No evidence of skin breakdown. No acute events so far this shift. Pt remaining free from falls or injury. Bed in lowest locked position, side rails up x2, call light w/i reach, pt instructed to call for assistance if needed. Skid proof socks on. Care plan explained to patient. No additional complaints at this time. Q 2hr rounding performed. Will continue to monitor.       detailed exam

## 2017-12-05 LAB
INR PPP: 1.96 RATIO
PT BLD: 22.4 SEC

## 2017-12-08 ENCOUNTER — APPOINTMENT (OUTPATIENT)
Dept: RHEUMATOLOGY | Facility: CLINIC | Age: 62
End: 2017-12-08
Payer: MEDICARE

## 2017-12-08 VITALS
WEIGHT: 200 LBS | HEART RATE: 90 BPM | DIASTOLIC BLOOD PRESSURE: 76 MMHG | TEMPERATURE: 97.4 F | SYSTOLIC BLOOD PRESSURE: 113 MMHG | HEIGHT: 70 IN | BODY MASS INDEX: 28.63 KG/M2 | RESPIRATION RATE: 16 BRPM | OXYGEN SATURATION: 98 %

## 2017-12-08 PROCEDURE — 99214 OFFICE O/P EST MOD 30 MIN: CPT

## 2017-12-11 LAB
ALBUMIN SERPL ELPH-MCNC: 4.5 G/DL
ALP BLD-CCNC: 77 U/L
ALT SERPL-CCNC: 19 U/L
ANION GAP SERPL CALC-SCNC: 14 MMOL/L
AST SERPL-CCNC: 23 U/L
BASOPHILS # BLD AUTO: 0.02 K/UL
BASOPHILS NFR BLD AUTO: 0.2 %
BILIRUB SERPL-MCNC: 0.6 MG/DL
BUN SERPL-MCNC: 19 MG/DL
CALCIUM SERPL-MCNC: 9.3 MG/DL
CHLORIDE SERPL-SCNC: 99 MMOL/L
CO2 SERPL-SCNC: 27 MMOL/L
CREAT SERPL-MCNC: 0.94 MG/DL
CRP SERPL-MCNC: 0.4 MG/DL
EOSINOPHIL # BLD AUTO: 0.13 K/UL
EOSINOPHIL NFR BLD AUTO: 1.3 %
ERYTHROCYTE [SEDIMENTATION RATE] IN BLOOD BY WESTERGREN METHOD: 26 MM/HR
GLUCOSE SERPL-MCNC: 87 MG/DL
HCT VFR BLD CALC: 43.8 %
HGB BLD-MCNC: 14.1 G/DL
IMM GRANULOCYTES NFR BLD AUTO: 0.4 %
LYMPHOCYTES # BLD AUTO: 1.87 K/UL
LYMPHOCYTES NFR BLD AUTO: 18.9 %
MAN DIFF?: NORMAL
MCHC RBC-ENTMCNC: 27.7 PG
MCHC RBC-ENTMCNC: 32.2 GM/DL
MCV RBC AUTO: 86.1 FL
MONOCYTES # BLD AUTO: 1.1 K/UL
MONOCYTES NFR BLD AUTO: 11.1 %
NEUTROPHILS # BLD AUTO: 6.71 K/UL
NEUTROPHILS NFR BLD AUTO: 68.1 %
PLATELET # BLD AUTO: 239 K/UL
POTASSIUM SERPL-SCNC: 4.8 MMOL/L
PROT SERPL-MCNC: 7 G/DL
RBC # BLD: 5.09 M/UL
RBC # FLD: 16.6 %
SODIUM SERPL-SCNC: 140 MMOL/L
WBC # FLD AUTO: 9.87 K/UL

## 2017-12-12 LAB — IL6 SERPL-MCNC: 8 PG/ML

## 2017-12-19 ENCOUNTER — APPOINTMENT (OUTPATIENT)
Dept: CARDIOLOGY | Facility: CLINIC | Age: 62
End: 2017-12-19
Payer: MEDICARE

## 2017-12-19 ENCOUNTER — MESSAGE (OUTPATIENT)
Age: 62
End: 2017-12-19

## 2017-12-19 VITALS
DIASTOLIC BLOOD PRESSURE: 54 MMHG | WEIGHT: 196 LBS | SYSTOLIC BLOOD PRESSURE: 82 MMHG | OXYGEN SATURATION: 98 % | BODY MASS INDEX: 28.06 KG/M2 | HEART RATE: 83 BPM | HEIGHT: 70 IN

## 2017-12-19 VITALS — SYSTOLIC BLOOD PRESSURE: 120 MMHG | DIASTOLIC BLOOD PRESSURE: 81 MMHG

## 2017-12-19 DIAGNOSIS — I20.0 UNSTABLE ANGINA: ICD-10-CM

## 2017-12-19 PROCEDURE — 93000 ELECTROCARDIOGRAM COMPLETE: CPT

## 2017-12-19 PROCEDURE — 99215 OFFICE O/P EST HI 40 MIN: CPT

## 2017-12-19 RX ORDER — CLINDAMYCIN HYDROCHLORIDE 300 MG/1
300 CAPSULE ORAL EVERY 6 HOURS
Qty: 28 | Refills: 0 | Status: DISCONTINUED | COMMUNITY
Start: 2017-06-26 | End: 2017-12-19

## 2017-12-19 RX ORDER — PANTOPRAZOLE 40 MG/1
40 TABLET, DELAYED RELEASE ORAL
Qty: 30 | Refills: 11 | Status: DISCONTINUED | COMMUNITY
Start: 2017-09-05 | End: 2017-12-19

## 2018-01-04 ENCOUNTER — APPOINTMENT (OUTPATIENT)
Dept: ELECTROPHYSIOLOGY | Facility: CLINIC | Age: 63
End: 2018-01-04
Payer: MEDICARE

## 2018-01-04 PROCEDURE — 93295 DEV INTERROG REMOTE 1/2/MLT: CPT

## 2018-03-15 LAB
INR PPP: 2.37 RATIO
PT BLD: 27.3 SEC

## 2018-04-14 ENCOUNTER — RX RENEWAL (OUTPATIENT)
Age: 63
End: 2018-04-14

## 2018-04-30 LAB
INR PPP: 1.73 RATIO
PT BLD: 19.8 SEC

## 2018-05-08 ENCOUNTER — APPOINTMENT (OUTPATIENT)
Dept: CARDIOLOGY | Facility: CLINIC | Age: 63
End: 2018-05-08
Payer: MEDICARE

## 2018-05-08 VITALS
DIASTOLIC BLOOD PRESSURE: 76 MMHG | BODY MASS INDEX: 27.77 KG/M2 | HEART RATE: 79 BPM | OXYGEN SATURATION: 96 % | WEIGHT: 194 LBS | SYSTOLIC BLOOD PRESSURE: 113 MMHG | HEIGHT: 70 IN

## 2018-05-08 DIAGNOSIS — I25.2 OLD MYOCARDIAL INFARCTION: ICD-10-CM

## 2018-05-08 PROCEDURE — 99215 OFFICE O/P EST HI 40 MIN: CPT

## 2018-05-08 PROCEDURE — 93000 ELECTROCARDIOGRAM COMPLETE: CPT

## 2018-06-04 ENCOUNTER — FORM ENCOUNTER (OUTPATIENT)
Age: 63
End: 2018-06-04

## 2018-06-05 ENCOUNTER — APPOINTMENT (OUTPATIENT)
Dept: RHEUMATOLOGY | Facility: CLINIC | Age: 63
End: 2018-06-05
Payer: MEDICARE

## 2018-06-05 VITALS
HEIGHT: 70 IN | OXYGEN SATURATION: 98 % | TEMPERATURE: 98.4 F | SYSTOLIC BLOOD PRESSURE: 99 MMHG | BODY MASS INDEX: 28.06 KG/M2 | WEIGHT: 196 LBS | DIASTOLIC BLOOD PRESSURE: 64 MMHG | HEART RATE: 65 BPM

## 2018-06-05 DIAGNOSIS — M10.9 GOUT, UNSPECIFIED: ICD-10-CM

## 2018-06-05 PROCEDURE — 73080 X-RAY EXAM OF ELBOW: CPT | Mod: RT

## 2018-06-05 PROCEDURE — 99214 OFFICE O/P EST MOD 30 MIN: CPT

## 2018-06-06 LAB
ALBUMIN SERPL ELPH-MCNC: 4.1 G/DL
ALP BLD-CCNC: 84 U/L
ALT SERPL-CCNC: 24 U/L
ANION GAP SERPL CALC-SCNC: 15 MMOL/L
AST SERPL-CCNC: 25 U/L
BASOPHILS # BLD AUTO: 0.02 K/UL
BASOPHILS NFR BLD AUTO: 0.2 %
BILIRUB SERPL-MCNC: 1 MG/DL
BUN SERPL-MCNC: 16 MG/DL
CALCIUM SERPL-MCNC: 9.3 MG/DL
CHLORIDE SERPL-SCNC: 98 MMOL/L
CO2 SERPL-SCNC: 26 MMOL/L
CREAT SERPL-MCNC: 0.9 MG/DL
CRP SERPL-MCNC: 0.3 MG/DL
EOSINOPHIL # BLD AUTO: 0.13 K/UL
EOSINOPHIL NFR BLD AUTO: 1.4 %
ERYTHROCYTE [SEDIMENTATION RATE] IN BLOOD BY WESTERGREN METHOD: 5 MM/HR
GLUCOSE SERPL-MCNC: 91 MG/DL
HCT VFR BLD CALC: 45.7 %
HGB BLD-MCNC: 14.7 G/DL
IMM GRANULOCYTES NFR BLD AUTO: 0.3 %
LYMPHOCYTES # BLD AUTO: 1.69 K/UL
LYMPHOCYTES NFR BLD AUTO: 18.4 %
MAN DIFF?: NORMAL
MCHC RBC-ENTMCNC: 28.9 PG
MCHC RBC-ENTMCNC: 32.2 GM/DL
MCV RBC AUTO: 89.8 FL
MONOCYTES # BLD AUTO: 1.07 K/UL
MONOCYTES NFR BLD AUTO: 11.6 %
NEUTROPHILS # BLD AUTO: 6.26 K/UL
NEUTROPHILS NFR BLD AUTO: 68.1 %
PLATELET # BLD AUTO: 228 K/UL
POTASSIUM SERPL-SCNC: 4.9 MMOL/L
PROT SERPL-MCNC: 6.8 G/DL
RBC # BLD: 5.09 M/UL
RBC # FLD: 15.9 %
SODIUM SERPL-SCNC: 139 MMOL/L
WBC # FLD AUTO: 9.2 K/UL

## 2018-07-14 ENCOUNTER — TRANSCRIPTION ENCOUNTER (OUTPATIENT)
Age: 63
End: 2018-07-14

## 2018-07-19 ENCOUNTER — APPOINTMENT (OUTPATIENT)
Dept: ELECTROPHYSIOLOGY | Facility: CLINIC | Age: 63
End: 2018-07-19
Payer: MEDICARE

## 2018-07-19 PROCEDURE — 93296 REM INTERROG EVL PM/IDS: CPT

## 2018-07-19 PROCEDURE — 93295 DEV INTERROG REMOTE 1/2/MLT: CPT

## 2018-08-28 ENCOUNTER — EMERGENCY (EMERGENCY)
Facility: HOSPITAL | Age: 63
LOS: 1 days | Discharge: HOME | End: 2018-08-28
Attending: EMERGENCY MEDICINE
Payer: MEDICARE

## 2018-08-28 VITALS
TEMPERATURE: 98 F | HEART RATE: 70 BPM | RESPIRATION RATE: 16 BRPM | OXYGEN SATURATION: 96 % | DIASTOLIC BLOOD PRESSURE: 61 MMHG | SYSTOLIC BLOOD PRESSURE: 91 MMHG

## 2018-08-28 VITALS
HEIGHT: 70 IN | RESPIRATION RATE: 14 BRPM | WEIGHT: 190.92 LBS | HEART RATE: 63 BPM | OXYGEN SATURATION: 94 % | TEMPERATURE: 98 F | DIASTOLIC BLOOD PRESSURE: 61 MMHG | SYSTOLIC BLOOD PRESSURE: 94 MMHG

## 2018-08-28 PROCEDURE — 99283 EMERGENCY DEPT VISIT LOW MDM: CPT

## 2018-08-28 PROCEDURE — 73560 X-RAY EXAM OF KNEE 1 OR 2: CPT

## 2018-08-28 PROCEDURE — 73560 X-RAY EXAM OF KNEE 1 OR 2: CPT | Mod: 26,RT

## 2018-08-28 RX ORDER — IBUPROFEN 200 MG
600 TABLET ORAL ONCE
Qty: 0 | Refills: 0 | Status: DISCONTINUED | OUTPATIENT
Start: 2018-08-28 | End: 2018-08-28

## 2018-08-28 RX ORDER — ACETAMINOPHEN 500 MG
650 TABLET ORAL ONCE
Qty: 0 | Refills: 0 | Status: COMPLETED | OUTPATIENT
Start: 2018-08-28 | End: 2018-08-28

## 2018-08-28 RX ADMIN — Medication 650 MILLIGRAM(S): at 10:35

## 2018-08-28 NOTE — ED PROVIDER NOTE - OBJECTIVE STATEMENT
33yoM pw a few days of worsening knee pain, right sided, throbbing, moderate in severeity, worse with walking, better with rest. has been walking over past 2 weeks more than usual. no fever, trauma, injuries or other issues.

## 2018-08-28 NOTE — ED ADULT NURSE NOTE - NSIMPLEMENTINTERV_GEN_ALL_ED
Implemented All Universal Safety Interventions:  [place] to call system. Call bell, personal items and telephone within reach. Instruct patient to call for assistance. Room bathroom lighting operational. Non-slip footwear when patient is off stretcher. Physically safe environment: no spills, clutter or unnecessary equipment. Stretcher in lowest position, wheels locked, appropriate side rails in place.

## 2018-08-28 NOTE — ED ADULT NURSE NOTE - OBJECTIVE STATEMENT
pt 62 yo male with non traumatic right knee pain onset yesterday pt on warfarin and plavix with ACID in place pt states pain on weight bearing not at rest non radiating pain

## 2018-08-28 NOTE — ED ADULT NURSE NOTE - PMH
CAD (coronary artery disease)    Chronic systolic congestive heart failure  EF15%-35%  GERD (gastroesophageal reflux disease)    H/O hyperlipidemia    HTN (hypertension)    Intermittent claudication    Joint pain, foot  in toes  Left ventricular thrombus    Moderate to severe pulmonary hypertension    PAD (peripheral artery disease)    Rheumatoid arthritis    Severe mitral regurgitation    STEMI (ST elevation myocardial infarction)  AWMI with cardiogenic shock 9/11/11- with emergent stent to LAD/impella at [facility]  Systolic heart failure  EF 29% via TTE 4/15

## 2018-08-28 NOTE — ED PROVIDER NOTE - PHYSICAL EXAMINATION
right knee with obvious effusion, erythema, edema  right knee no joint intstability  full range of motion of right knee with no pain  no crepitus of right knee right knee with no obvious effusion, erythema, edema  right knee no joint intstability  full range of motion of right knee with no pain  no crepitus of right knee

## 2018-08-28 NOTE — ED PROVIDER NOTE - PMH
CAD (coronary artery disease)    Chronic systolic congestive heart failure  EF15%-35%  GERD (gastroesophageal reflux disease)    H/O hyperlipidemia    HTN (hypertension)    Intermittent claudication    Joint pain, foot  in toes  Left ventricular thrombus    Moderate to severe pulmonary hypertension    PAD (peripheral artery disease)    Rheumatoid arthritis    Severe mitral regurgitation    STEMI (ST elevation myocardial infarction)  AWMI with cardiogenic shock 9/11/11- with emergent stent to LAD/impella at [facility]  Systolic heart failure  EF 29% via TTE 4/15 CAD (coronary artery disease)    Chronic systolic congestive heart failure  EF15%-35%  GERD (gastroesophageal reflux disease)    H/O hyperlipidemia    HTN (hypertension)    Intermittent claudication    Joint pain, foot  in toes  Left ventricular thrombus    Moderate to severe pulmonary hypertension    PAD (peripheral artery disease)    Rheumatoid arthritis    Severe mitral regurgitation    STEMI (ST elevation myocardial infarction)  AWMI with cardiogenic shock 9/11/11- with emergent stent to LAD/impella at [facility]  Systolic heart failure  EF 29% via TTE 4/15

## 2018-08-28 NOTE — ED PROVIDER NOTE - ATTENDING CONTRIBUTION TO CARE
64 yo M presents with pain within the R knee over the past few days, and worse last night/this morning after walking a lot. he states that he has been walking a lot over the past 2 weeks, moreso than usual, and he thinks that that has aggravated he knees. NO pain at rest, but pain comes on with walking or any sort of activity. no calf pain or swelling. no specific injury or fall.   no f/ch, CP/SOB. no DVT risk factors. very mobile. on coumadin and plavix, and compliant with these meds.   PE well appearing lungs CTA. abd soft and NT. R knee is minimally tender R lateral meniscus and R LCL. but knee joint stable. no laxity. 2+ PP. NVI.   pain likely ligamentous in origin.   xray showing no fx.   no s/s concerning for DVT. patient ACE wrapped. Patient was discharged with instructions to rest, elevate leg, ice BID, tylenol for pain , and follow up with ortho in 1-2 days for repeat evaluation and further management.    The patient was discharged from the ED in stable condition. All results of today's workup were discussed with the patient and all questions/concerns were addressed. All discharge instructions were thoroughly discussed with the patient, as well as important warning signs and new/ worsening symptoms which should necessitate patient's immediate return to the ED. The patient is agreeable with discharge and expresses full understanding of all instructions given. 62 yo M presents with pain within the R knee over the past few days, and worse last night/this morning after walking a lot. he states that he has been walking a lot over the past 2 weeks, moreso than usual, and he thinks that he has aggravated he knees. NO pain at rest, but pain comes on with walking or any sort of activity. no calf pain or swelling. no specific injury or fall.   no f/ch, CP/SOB. no DVT risk factors. very mobile. on coumadin and plavix, and compliant with these meds.   PE well appearing lungs CTA. abd soft and NT. R knee is minimally tender R lateral meniscus and R LCL. but knee joint stable. no laxity. 2+ PP. NVI.   VS reviewed - pt reports that is his normal BP.  pain likely ligamentous in origin.   xray showing no fx.   no s/s concerning for DVT. patient ACE wrapped. Patient was discharged with instructions to rest, elevate leg, ice BID, tylenol for pain , and follow up with ortho in 1-2 days for repeat evaluation and further management.    The patient was discharged from the ED in stable condition. All results of today's workup were discussed with the patient and all questions/concerns were addressed. All discharge instructions were thoroughly discussed with the patient, as well as important warning signs and new/ worsening symptoms which should necessitate patient's immediate return to the ED. The patient is agreeable with discharge and expresses full understanding of all instructions given. 64 yo M presents with pain within the R knee over the past few days, and worse last night/this morning after walking a lot. he states that he has been walking a lot over the past 2 weeks, moreso than usual, and he thinks that he has aggravated he knees. NO pain at rest, but pain comes on with walking or any sort of activity. no calf pain or swelling. all pain WITHIN the knee. no specific injury or fall.   no f/ch, CP/SOB. no DVT risk factors. very mobile. on coumadin and plavix, and compliant with these meds.   PE well appearing lungs CTA. abd soft and NT. R knee is minimally tender R lateral meniscus and R LCL. but knee joint stable. no laxity. 2+ PP. NVI.   VS reviewed - pt reports that is his normal BP.  pain likely ligamentous in origin.   xray showing no fx.   no s/s concerning for DVT. patient ACE wrapped. Patient was discharged with instructions to rest, elevate leg, ice BID, tylenol for pain , and follow up with ortho in 1-2 days for repeat evaluation and further management.    The patient was discharged from the ED in stable condition. All results of today's workup were discussed with the patient and all questions/concerns were addressed. All discharge instructions were thoroughly discussed with the patient, as well as important warning signs and new/ worsening symptoms which should necessitate patient's immediate return to the ED. The patient is agreeable with discharge and expresses full understanding of all instructions given.

## 2018-10-08 ENCOUNTER — LABORATORY RESULT (OUTPATIENT)
Age: 63
End: 2018-10-08

## 2018-10-16 ENCOUNTER — NON-APPOINTMENT (OUTPATIENT)
Age: 63
End: 2018-10-16

## 2018-10-16 ENCOUNTER — APPOINTMENT (OUTPATIENT)
Dept: CARDIOLOGY | Facility: CLINIC | Age: 63
End: 2018-10-16
Payer: MEDICARE

## 2018-10-16 VITALS
WEIGHT: 185 LBS | HEART RATE: 72 BPM | OXYGEN SATURATION: 96 % | BODY MASS INDEX: 26.48 KG/M2 | DIASTOLIC BLOOD PRESSURE: 68 MMHG | SYSTOLIC BLOOD PRESSURE: 100 MMHG | HEIGHT: 70 IN

## 2018-10-16 PROCEDURE — 99215 OFFICE O/P EST HI 40 MIN: CPT

## 2018-10-16 PROCEDURE — 93000 ELECTROCARDIOGRAM COMPLETE: CPT

## 2018-11-16 ENCOUNTER — OUTPATIENT (OUTPATIENT)
Dept: OUTPATIENT SERVICES | Facility: HOSPITAL | Age: 63
LOS: 1 days | End: 2018-11-16
Payer: MEDICARE

## 2018-11-16 ENCOUNTER — APPOINTMENT (OUTPATIENT)
Dept: CV DIAGNOSITCS | Facility: HOSPITAL | Age: 63
End: 2018-11-16

## 2018-11-16 DIAGNOSIS — I25.10 ATHEROSCLEROTIC HEART DISEASE OF NATIVE CORONARY ARTERY WITHOUT ANGINA PECTORIS: ICD-10-CM

## 2018-11-16 PROCEDURE — 93306 TTE W/DOPPLER COMPLETE: CPT | Mod: 26

## 2018-11-16 PROCEDURE — C8929: CPT

## 2018-11-19 ENCOUNTER — RX RENEWAL (OUTPATIENT)
Age: 63
End: 2018-11-19

## 2018-11-19 ENCOUNTER — TRANSCRIPTION ENCOUNTER (OUTPATIENT)
Age: 63
End: 2018-11-19

## 2018-11-19 ENCOUNTER — MEDICATION RENEWAL (OUTPATIENT)
Age: 63
End: 2018-11-19

## 2018-11-26 RX ORDER — CLOPIDOGREL BISULFATE 75 MG/1
75 TABLET, FILM COATED ORAL DAILY
Qty: 90 | Refills: 3 | Status: ACTIVE | COMMUNITY
Start: 2017-01-31 | End: 1900-01-01

## 2018-11-26 RX ORDER — FUROSEMIDE 40 MG/1
40 TABLET ORAL TWICE DAILY
Qty: 180 | Refills: 3 | Status: ACTIVE | COMMUNITY
Start: 2017-01-31 | End: 1900-01-01

## 2018-11-27 ENCOUNTER — APPOINTMENT (OUTPATIENT)
Dept: RHEUMATOLOGY | Facility: CLINIC | Age: 63
End: 2018-11-27
Payer: MEDICARE

## 2018-11-27 VITALS
TEMPERATURE: 97.8 F | DIASTOLIC BLOOD PRESSURE: 55 MMHG | WEIGHT: 185 LBS | SYSTOLIC BLOOD PRESSURE: 93 MMHG | HEIGHT: 70 IN | HEART RATE: 67 BPM | OXYGEN SATURATION: 96 % | BODY MASS INDEX: 26.48 KG/M2

## 2018-11-27 DIAGNOSIS — R05 COUGH: ICD-10-CM

## 2018-11-27 PROCEDURE — 99214 OFFICE O/P EST MOD 30 MIN: CPT

## 2018-11-28 PROBLEM — R05 COUGH: Status: ACTIVE | Noted: 2018-11-27

## 2018-11-30 ENCOUNTER — LABORATORY RESULT (OUTPATIENT)
Age: 63
End: 2018-11-30

## 2018-12-04 ENCOUNTER — FORM ENCOUNTER (OUTPATIENT)
Age: 63
End: 2018-12-04

## 2018-12-05 ENCOUNTER — APPOINTMENT (OUTPATIENT)
Dept: RADIOLOGY | Facility: IMAGING CENTER | Age: 63
End: 2018-12-05
Payer: MEDICARE

## 2018-12-05 ENCOUNTER — OUTPATIENT (OUTPATIENT)
Dept: OUTPATIENT SERVICES | Facility: HOSPITAL | Age: 63
LOS: 1 days | End: 2018-12-05
Payer: MEDICARE

## 2018-12-05 DIAGNOSIS — R05 COUGH: ICD-10-CM

## 2018-12-05 PROCEDURE — 71048 X-RAY EXAM CHEST 4+ VIEWS: CPT

## 2018-12-05 PROCEDURE — 71048 X-RAY EXAM CHEST 4+ VIEWS: CPT | Mod: 26

## 2018-12-11 ENCOUNTER — RX RENEWAL (OUTPATIENT)
Age: 63
End: 2018-12-11

## 2018-12-12 ENCOUNTER — RX RENEWAL (OUTPATIENT)
Age: 63
End: 2018-12-12

## 2018-12-12 ENCOUNTER — TRANSCRIPTION ENCOUNTER (OUTPATIENT)
Age: 63
End: 2018-12-12

## 2018-12-18 NOTE — DISCUSSION/SUMMARY
[FreeTextEntry1] : Mr. Mclaughlin is a 63 year-old man with a recent anterior wall MI and LV thrombus who presents for routine followup.\par \par Plan:\par 1. Given the repeat need for revascularization again, we will c/w aggressive med mgmt. - s/p brachytherapy to the LCx.\par 2. Given the AWMI  it is mandatory that the patient remain on clopidogrel along with coumadin no given the LV thrombus. C/w coumadin and clopidogrel for now.\par 3. He should be further optimized on his cardiomyopathy regimen with metoprolol 25mg twice daily (which should be changed to carvedilol in the future if his BP tolerates it) and lisinopril, which will be maintained at 10mg daily as his BP will not allow further uptitration at this time.\par 4. Other secondary prevention regimen to be continued with atorvastatin at present. We will check his lipid profile prior to his next visit.\par 5. Followup with EPS for post-ICD care.\par 6. Old records requested and reviewed with performing physician.\par 7. Primary and secondary prevention of cardiovascular and related conditions discussed at length, including but not limited to diet and lifestyle modification.\par 8. Patient to return to the office in 1-3 months.\par \par Thank you for allowing me to participate in the care of your patient. If you have any questions, please feel free to contact me at [phone] or via email at [email or web address].\par \par Sincerely,\par \par Jl Ferro MD [facility]

## 2018-12-18 NOTE — HISTORY OF PRESENT ILLNESS
[FreeTextEntry1] : Mr. Rowley is a 63 year-old man with a admission in September 2012 for an anterior wall STEMI with LAD stent thrombosis due to medication non-compliance who now comes to the office for followup. He had presented in cardiogenic shock, requiring Impella 2.5 L/min support for 48+ hours after which he improved. He had a prolonged hospital stay with an incidental finding of LV thrombi, which required the use of coumadin, along with aspirin and clopidogrel. He had an ICD placed in 2014 without concern, however on followup was noted to have fast VT which terminated and his beta-blocker dose was increased. He had no symptoms at the time. He also noted swelling at the ICD site without discharge. He has been off coumadin now given repeat echocardiography revealing no further LV thromobus.\par \par He subsequently returned with classic exertional unstable angina in 4/2015 with less exercise tolerance and subsequently underwent high risk PCI with Impella support to the dLM, oLAD and oLCx with excellent result. He returned again in 8/2015 for new unstable angina/NSTEMI which revealed restenosis of the LCx stent, thus revascularized.\par \par Earlier in 2016, he developed severe systolic CHF due to dietary noncompliance and restenosis. At that time a 3rd Impella was placed and the following day the LCx was angioplastied and the RCA  was also opened. In June 2016, he underwent brachytherapy to the pLCx as a last ditch effort to maintain patency of the LCx stent. His most recent TTE still reveals an LV thrombus.

## 2018-12-18 NOTE — REASON FOR VISIT
[Follow-Up - Clinic] : a clinic follow-up of [Cardiomyopathy] : cardiomyopathy [Coronary Artery Disease] : coronary artery disease [Admitted for Heart Failure] : patient was admitted for heart failure [FreeTextEntry1] : ICD implant.

## 2018-12-18 NOTE — PHYSICAL EXAM
[General Appearance - Well Developed] : well developed [Normal Appearance] : normal appearance [Well Groomed] : well groomed [General Appearance - Well Nourished] : well nourished [No Deformities] : no deformities [General Appearance - In No Acute Distress] : no acute distress [Normal Conjunctiva] : the conjunctiva exhibited no abnormalities [Eyelids - No Xanthelasma] : the eyelids demonstrated no xanthelasmas [Normal Oral Mucosa] : normal oral mucosa [No Oral Pallor] : no oral pallor [No Oral Cyanosis] : no oral cyanosis [Normal Jugular Venous A Waves Present] : normal jugular venous A waves present [Normal Jugular Venous V Waves Present] : normal jugular venous V waves present [No Jugular Venous Hi A Waves] : no jugular venous hi A waves [Respiration, Rhythm And Depth] : normal respiratory rhythm and effort [Exaggerated Use Of Accessory Muscles For Inspiration] : no accessory muscle use [Auscultation Breath Sounds / Voice Sounds] : lungs were clear to auscultation bilaterally [Heart Rate And Rhythm] : heart rate and rhythm were normal [Heart Sounds] : normal S1 and S2 [Murmurs] : no murmurs present [Abdomen Soft] : soft [Abdomen Tenderness] : non-tender [Abdomen Mass (___ Cm)] : no abdominal mass palpated [Abnormal Walk] : normal gait [Gait - Sufficient For Exercise Testing] : the gait was sufficient for exercise testing [Nail Clubbing] : no clubbing of the fingernails [Cyanosis, Localized] : no localized cyanosis [Petechial Hemorrhages (___cm)] : no petechial hemorrhages [Skin Color & Pigmentation] : normal skin color and pigmentation [] : no rash [No Venous Stasis] : no venous stasis [Skin Lesions] : no skin lesions [No Skin Ulcers] : no skin ulcer [No Xanthoma] : no  xanthoma was observed [Oriented To Time, Place, And Person] : oriented to person, place, and time [Affect] : the affect was normal [Mood] : the mood was normal [No Anxiety] : not feeling anxious

## 2019-01-08 ENCOUNTER — APPOINTMENT (OUTPATIENT)
Dept: INTERNAL MEDICINE | Facility: CLINIC | Age: 64
End: 2019-01-08

## 2019-01-14 ENCOUNTER — APPOINTMENT (OUTPATIENT)
Dept: ELECTROPHYSIOLOGY | Facility: CLINIC | Age: 64
End: 2019-01-14

## 2019-01-30 ENCOUNTER — APPOINTMENT (OUTPATIENT)
Dept: UROLOGY | Facility: CLINIC | Age: 64
End: 2019-01-30
Payer: MEDICARE

## 2019-01-30 DIAGNOSIS — I49.01 VENTRICULAR FIBRILLATION: ICD-10-CM

## 2019-01-30 DIAGNOSIS — R97.20 ELEVATED PROSTATE, SPECIFIC ANTIGEN [PSA]: ICD-10-CM

## 2019-01-30 DIAGNOSIS — Z82.49 FAMILY HISTORY OF ISCHEMIC HEART DISEASE AND OTHER DISEASES OF THE CIRCULATORY SYSTEM: ICD-10-CM

## 2019-01-30 DIAGNOSIS — Z87.891 PERSONAL HISTORY OF NICOTINE DEPENDENCE: ICD-10-CM

## 2019-01-30 DIAGNOSIS — R97.20 BENIGN PROSTATIC HYPERPLASIA WITHOUT LOWER URINARY TRACT SYMPMS: ICD-10-CM

## 2019-01-30 DIAGNOSIS — N40.0 BENIGN PROSTATIC HYPERPLASIA WITHOUT LOWER URINARY TRACT SYMPMS: ICD-10-CM

## 2019-01-30 LAB
BILIRUB UR QL STRIP: NEGATIVE
CLARITY UR: CLEAR
COLLECTION METHOD: NORMAL
GLUCOSE UR-MCNC: NEGATIVE
HCG UR QL: 1 EU/DL
HGB UR QL STRIP.AUTO: NEGATIVE
KETONES UR-MCNC: NORMAL
LEUKOCYTE ESTERASE UR QL STRIP: NEGATIVE
NITRITE UR QL STRIP: NEGATIVE
PH UR STRIP: 6.5
PROT UR STRIP-MCNC: NEGATIVE
SP GR UR STRIP: 1.02

## 2019-01-30 PROCEDURE — 99204 OFFICE O/P NEW MOD 45 MIN: CPT | Mod: 25

## 2019-01-30 PROCEDURE — 51798 US URINE CAPACITY MEASURE: CPT

## 2019-01-30 NOTE — PHYSICAL EXAM
[General Appearance - Well Developed] : well developed [General Appearance - Well Nourished] : well nourished [Normal Appearance] : normal appearance [Well Groomed] : well groomed [General Appearance - In No Acute Distress] : no acute distress [Edema] : no peripheral edema [Respiration, Rhythm And Depth] : normal respiratory rhythm and effort [Exaggerated Use Of Accessory Muscles For Inspiration] : no accessory muscle use [Abdomen Soft] : soft [Abdomen Tenderness] : non-tender [Costovertebral Angle Tenderness] : no ~M costovertebral angle tenderness [Urethral Meatus] : meatus normal [Penis Abnormality] : normal circumcised penis [Urinary Bladder Findings] : the bladder was normal on palpation [Scrotum] : the scrotum was normal [Epididymis] : the epididymides were normal [Testes Mass (___cm)] : there were no testicular masses [Rectal Exam - Rectum] : digital rectal exam was normal [No Prostate Nodules] : no prostate nodules [Prostate Size ___ (0-4)] : prostate size [unfilled] (scale: 0-4) [Normal Station and Gait] : the gait and station were normal for the patient's age [] : no rash [No Focal Deficits] : no focal deficits [Oriented To Time, Place, And Person] : oriented to person, place, and time [Affect] : the affect was normal [Mood] : the mood was normal [Not Anxious] : not anxious [No Palpable Adenopathy] : no palpable adenopathy

## 2019-01-30 NOTE — LETTER BODY
[Dear  ___] : Dear  [unfilled], [Consult Letter:] : I had the pleasure of evaluating your patient, [unfilled]. [( Thank you for referring [unfilled] for consultation for _____ )] : Thank you for referring [unfilled] for consultation for [unfilled] [Please see my note below.] : Please see my note below. [Consult Closing:] : Thank you very much for allowing me to participate in the care of this patient.  If you have any questions, please do not hesitate to contact me. [Sincerely,] : Sincerely, [FreeTextEntry1] : Pt is 64 yo male with sig cardiac history, including AICD for v-fib issues, CAD with stent placement, and episode of CHF 2016.  Currently on meds, but considered sig cardiac risk to the point of avoiding colonoscopy and choosing Cologard screening.  On regular furosemide, with issues of frequency post dosing, but denies other sig voiding issues.\par \par Has not seen gross hematuria, no dysuria. Feels strong stream, no sig frequency other than with furosemide.  No flank or abdominal pain.\par \par Recent PSA was 7.96 on 11/20/18.\par \par PMH: Sig CAD as above\par PSH: coronary stents on 4 occasions: 2008, 20011, 2012, 2016, AICD, h/o brachytherapy (arterial), torsion of testicle (1969)\par Allergies: pcn\par Meds see list;:\par \par PE today: prostate moderately enlarged, no nodules or induration.\par \par u/a today: wnl\par Bladder scan: 2 cc \par \par I had long discussion today with patient about the psa, digital exam, and any imaging findings with respect to risks for prostate cancer.  We discussed the utility of prostate MRI as well as some of the new genetic markers in terms of the potential for improving diagnostic accuracy.\par \par With respect to prostate cancer, we also reviewed all therapeutic options including observation/surveillance, radical prostatectomy, radiation therapy, hormonal therapy.  All risks/benefits reviewed at length, including disease course surgical risks, long term radiation risks, indications for concomitant use of hormonal therapy.  We discussed surgical approaches as well, including open and lap/robotic.\par \par We discussed implications as to voiding symptoms, especially with respect to treatment options chosen, and the risk/benefits of prostate biopsy in terms of procedure, lopez-procedure (sepsis, infection, bleeding, retention), and implications/advantages of establishing the diagnosis.\par \par He would like to proceed with: \par Recheck PSA today- only one datapoint to this point, pt relates no prior screening numbers\par will review by phone and discuss, with plan likely for prostate biopsy as long as patient can come off warfarin and plavix for the biopsy. [DrPradip  ___] : Dr. SINGLETON [DrPradip ___] : Dr. SINGLETON

## 2019-01-30 NOTE — REVIEW OF SYSTEMS
[Negative] : Endocrine [Chest Pain] : chest pain [Wake up at night to urinate  How many times?  ___] : wakes up to urinate [unfilled] times during the night [Joint Pain] : joint pain [Easy Bleeding] : a tendency for easy bleeding [Easy Bruising] : a tendency for easy bruising [FreeTextEntry3] : dribbling of urine

## 2019-01-30 NOTE — ASSESSMENT
[FreeTextEntry1] : u/a today: wnl\par Bladder scan: 2 cc \par \par I had long discussion today with patient about the psa, digital exam, and any imaging findings with respect to risks for prostate cancer.  We discussed the utility of prostate MRI as well as some of the new genetic markers in terms of the potential for improving diagnostic accuracy.\par \par With respect to prostate cancer, we also reviewed all therapeutic options including observation/surveillance, radical prostatectomy, radiation therapy, hormonal therapy.  All risks/benefits reviewed at length, including disease course surgical risks, long term radiation risks, indications for concomitant use of hormonal therapy.  We discussed surgical approaches as well, including open and lap/robotic.\par \par We discussed implications as to voiding symptoms, especially with respect to treatment options chosen, and the risk/benefits of prostate biopsy in terms of procedure, lopez-procedure (sepsis, infection, bleeding, retention), and implications/advantages of establishing the diagnosis.\par \par He would like to proceed with: \par Recheck PSA today- only one datapoint to this point, pt relates no prior screening numbers\par will review by phone and discuss, with plan likely for prostate biopsy as long as patient can come off warfarin and plavix for the biopsy.\par \par \par \par \par

## 2019-01-30 NOTE — HISTORY OF PRESENT ILLNESS
[None] : no symptoms [FreeTextEntry1] : Pt is 62 yo male with sig cardiac history, including AICD for v-fib issues, CAD with stent placement, and episode of CHF 2016.  Currently on meds, but considered sig cardiac risk to the point of avoiding colonoscopy and choosing Cologard screening.  On regular furosemide, with issues of frequency post dosing, but denies other sig voiding issues.\par \par Has not seen gross hematuria, no dysuria. Feels strong stream, no sig frequency other than with furosemide.  No flank or abdominal pain.\par \par Recent PSA was 7.96 on 11/20/18.\par \par PMH: Sig CAD as above\par PSH: coronary stents on 4 occasions: 2008, 20011, 2012, 2016, AICD, h/o brachytherapy (arterial), torsion of testicle (1969)\par Allergies: pcn\par Meds see list;:\par

## 2019-01-31 LAB
INR PPP: 1.7 RATIO
PSA SERPL-MCNC: 6.54 NG/ML
PT BLD: 19.7 SEC

## 2019-02-05 ENCOUNTER — TRANSCRIPTION ENCOUNTER (OUTPATIENT)
Age: 64
End: 2019-02-05

## 2019-02-15 ENCOUNTER — TRANSCRIPTION ENCOUNTER (OUTPATIENT)
Age: 64
End: 2019-02-15

## 2019-02-19 ENCOUNTER — APPOINTMENT (OUTPATIENT)
Dept: CARDIOLOGY | Facility: CLINIC | Age: 64
End: 2019-02-19
Payer: MEDICARE

## 2019-02-19 VITALS
DIASTOLIC BLOOD PRESSURE: 55 MMHG | WEIGHT: 191 LBS | BODY MASS INDEX: 27.35 KG/M2 | OXYGEN SATURATION: 94 % | HEIGHT: 70 IN | HEART RATE: 67 BPM | SYSTOLIC BLOOD PRESSURE: 84 MMHG

## 2019-02-19 DIAGNOSIS — Z79.01 LONG TERM (CURRENT) USE OF ANTICOAGULANTS: ICD-10-CM

## 2019-02-19 DIAGNOSIS — I25.10 ATHEROSCLEROTIC HEART DISEASE OF NATIVE CORONARY ARTERY W/OUT ANGINA PECTORIS: ICD-10-CM

## 2019-02-19 DIAGNOSIS — I50.22 CHRONIC SYSTOLIC (CONGESTIVE) HEART FAILURE: ICD-10-CM

## 2019-02-19 DIAGNOSIS — I10 ESSENTIAL (PRIMARY) HYPERTENSION: ICD-10-CM

## 2019-02-19 DIAGNOSIS — E78.5 HYPERLIPIDEMIA, UNSPECIFIED: ICD-10-CM

## 2019-02-19 PROCEDURE — 93000 ELECTROCARDIOGRAM COMPLETE: CPT

## 2019-02-19 PROCEDURE — 99215 OFFICE O/P EST HI 40 MIN: CPT

## 2019-02-28 ENCOUNTER — APPOINTMENT (OUTPATIENT)
Dept: RHEUMATOLOGY | Facility: CLINIC | Age: 64
End: 2019-02-28
Payer: MEDICARE

## 2019-02-28 VITALS
DIASTOLIC BLOOD PRESSURE: 62 MMHG | HEIGHT: 70 IN | TEMPERATURE: 97.5 F | BODY MASS INDEX: 26.63 KG/M2 | OXYGEN SATURATION: 97 % | HEART RATE: 74 BPM | SYSTOLIC BLOOD PRESSURE: 92 MMHG | WEIGHT: 186 LBS

## 2019-02-28 DIAGNOSIS — M06.9 RHEUMATOID ARTHRITIS, UNSPECIFIED: ICD-10-CM

## 2019-02-28 PROCEDURE — 99214 OFFICE O/P EST MOD 30 MIN: CPT

## 2019-03-01 ENCOUNTER — TRANSCRIPTION ENCOUNTER (OUTPATIENT)
Age: 64
End: 2019-03-01

## 2019-03-01 DIAGNOSIS — Z79.899 OTHER LONG TERM (CURRENT) DRUG THERAPY: ICD-10-CM

## 2019-03-01 LAB
ALBUMIN SERPL ELPH-MCNC: 3.8 G/DL
ALP BLD-CCNC: 95 U/L
ALT SERPL-CCNC: 29 U/L
ANION GAP SERPL CALC-SCNC: 12 MMOL/L
AST SERPL-CCNC: 22 U/L
BASOPHILS # BLD AUTO: 0.05 K/UL
BASOPHILS NFR BLD AUTO: 0.4 %
BILIRUB SERPL-MCNC: 0.6 MG/DL
BUN SERPL-MCNC: 20 MG/DL
CALCIUM SERPL-MCNC: 9.2 MG/DL
CHLORIDE SERPL-SCNC: 96 MMOL/L
CO2 SERPL-SCNC: 25 MMOL/L
CREAT SERPL-MCNC: 0.95 MG/DL
CRP SERPL-MCNC: 2.38 MG/DL
EOSINOPHIL # BLD AUTO: 0.08 K/UL
EOSINOPHIL NFR BLD AUTO: 0.7 %
ERYTHROCYTE [SEDIMENTATION RATE] IN BLOOD BY WESTERGREN METHOD: 53 MM/HR
GLUCOSE SERPL-MCNC: 100 MG/DL
HCT VFR BLD CALC: 42.4 %
HGB BLD-MCNC: 13.8 G/DL
IMM GRANULOCYTES NFR BLD AUTO: 0.8 %
INR PPP: 4.23 RATIO
LYMPHOCYTES # BLD AUTO: 1.04 K/UL
LYMPHOCYTES NFR BLD AUTO: 8.8 %
MAN DIFF?: NORMAL
MCHC RBC-ENTMCNC: 29.1 PG
MCHC RBC-ENTMCNC: 32.5 GM/DL
MCV RBC AUTO: 89.5 FL
MONOCYTES # BLD AUTO: 1.2 K/UL
MONOCYTES NFR BLD AUTO: 10.1 %
NEUTROPHILS # BLD AUTO: 9.4 K/UL
NEUTROPHILS NFR BLD AUTO: 79.2 %
PLATELET # BLD AUTO: 300 K/UL
POTASSIUM SERPL-SCNC: 5.3 MMOL/L
PROT SERPL-MCNC: 6.5 G/DL
PT BLD: 50.9 SEC
RBC # BLD: 4.74 M/UL
RBC # FLD: 14.5 %
SODIUM SERPL-SCNC: 133 MMOL/L
WBC # FLD AUTO: 11.86 K/UL

## 2019-03-04 ENCOUNTER — LABORATORY RESULT (OUTPATIENT)
Age: 64
End: 2019-03-04

## 2019-03-05 PROBLEM — Z79.01 ANTICOAGULATED ON COUMADIN: Status: ACTIVE | Noted: 2019-03-01

## 2019-03-05 LAB
ALBUMIN SERPL ELPH-MCNC: 3.6 G/DL
ALP BLD-CCNC: 88 U/L
ALT SERPL-CCNC: 30 U/L
ANION GAP SERPL CALC-SCNC: 13 MMOL/L
AST SERPL-CCNC: 25 U/L
BASOPHILS # BLD AUTO: 0.04 K/UL
BASOPHILS NFR BLD AUTO: 0.4 %
BILIRUB SERPL-MCNC: 0.7 MG/DL
BUN SERPL-MCNC: 19 MG/DL
CALCIUM SERPL-MCNC: 9.1 MG/DL
CHLORIDE SERPL-SCNC: 94 MMOL/L
CO2 SERPL-SCNC: 25 MMOL/L
CREAT SERPL-MCNC: 0.96 MG/DL
CRP SERPL-MCNC: 1.86 MG/DL
EOSINOPHIL # BLD AUTO: 0.1 K/UL
EOSINOPHIL NFR BLD AUTO: 0.9 %
ERYTHROCYTE [SEDIMENTATION RATE] IN BLOOD BY WESTERGREN METHOD: 56 MM/HR
GLUCOSE SERPL-MCNC: 109 MG/DL
HCT VFR BLD CALC: 41.1 %
HGB BLD-MCNC: 13.6 G/DL
IMM GRANULOCYTES NFR BLD AUTO: 0.6 %
INR PPP: 2.07 RATIO
LYMPHOCYTES # BLD AUTO: 1.57 K/UL
LYMPHOCYTES NFR BLD AUTO: 13.9 %
MAN DIFF?: NORMAL
MCHC RBC-ENTMCNC: 29.3 PG
MCHC RBC-ENTMCNC: 33.1 GM/DL
MCV RBC AUTO: 88.6 FL
MONOCYTES # BLD AUTO: 1.64 K/UL
MONOCYTES NFR BLD AUTO: 14.5 %
NEUTROPHILS # BLD AUTO: 7.91 K/UL
NEUTROPHILS NFR BLD AUTO: 69.7 %
PLATELET # BLD AUTO: 311 K/UL
POTASSIUM SERPL-SCNC: 4.6 MMOL/L
PROT SERPL-MCNC: 7 G/DL
PT BLD: 24.1 SEC
RBC # BLD: 4.64 M/UL
RBC # FLD: 14.5 %
SODIUM SERPL-SCNC: 132 MMOL/L
WBC # FLD AUTO: 11.33 K/UL

## 2019-03-05 NOTE — DISCUSSION/SUMMARY
[FreeTextEntry1] : Mr. cMlaughlin is a 64 year-old man with a recent anterior wall MI and LV thrombus who presents for routine followup.\par \par Plan:\par 1. Given the repeat need for revascularization again, we will c/w aggressive med mgmt. - s/p brachytherapy to the LCx.\par 2. Given the AWMI  it is mandatory that the patient remain on clopidogrel along with coumadin no given the LV thrombus. C/w coumadin and clopidogrel for now.\par 3. He should be further optimized on his cardiomyopathy regimen with metoprolol 25mg twice daily (which should be changed to carvedilol in the future if his BP tolerates it) and lisinopril, which will be maintained at 10mg daily as his BP will not allow further uptitration at this time.\par 4. Other secondary prevention regimen to be continued with atorvastatin at present. We will check his lipid profile prior to his next visit.\par 5. Followup with EPS for post-ICD care.\par 6. Old records requested and reviewed with performing physician.\par 7. Primary and secondary prevention of cardiovascular and related conditions discussed at length, including but not limited to diet and lifestyle modification.\par 8. Patient to return to the office in 1-3 months.\par \par Thank you for allowing me to participate in the care of your patient. If you have any questions, please feel free to contact me at [phone] or via email at [email or web address].\par \par Sincerely,\par \par Jl Ferro MD [facility]

## 2019-03-05 NOTE — HISTORY OF PRESENT ILLNESS
[FreeTextEntry1] : Mr. Rowley is a 64 year-old man with a admission in September 2012 for an anterior wall STEMI with LAD stent thrombosis due to medication non-compliance who now comes to the office for followup. He had presented in cardiogenic shock, requiring Impella 2.5 L/min support for 48+ hours after which he improved. He had a prolonged hospital stay with an incidental finding of LV thrombi, which required the use of coumadin, along with aspirin and clopidogrel. He had an ICD placed in 2014 without concern, however on followup was noted to have fast VT which terminated and his beta-blocker dose was increased. He had no symptoms at the time. He also noted swelling at the ICD site without discharge. He has been off coumadin now given repeat echocardiography revealing no further LV thromobus.\par \par He subsequently returned with classic exertional unstable angina in 4/2015 with less exercise tolerance and subsequently underwent high risk PCI with Impella support to the dLM, oLAD and oLCx with excellent result. He returned again in 8/2015 for new unstable angina/NSTEMI which revealed restenosis of the LCx stent, thus revascularized.\par \par Earlier in 2016, he developed severe systolic CHF due to dietary noncompliance and restenosis. At that time a 3rd Impella was placed and the following day the LCx was angioplastied and the RCA  was also opened. In June 2016, he underwent brachytherapy to the pLCx as a last ditch effort to maintain patency of the LCx stent. His most recent TTE still reveals an LV thrombus.

## 2019-03-22 ENCOUNTER — TRANSCRIPTION ENCOUNTER (OUTPATIENT)
Age: 64
End: 2019-03-22

## 2019-04-08 ENCOUNTER — APPOINTMENT (OUTPATIENT)
Dept: ELECTROPHYSIOLOGY | Facility: CLINIC | Age: 64
End: 2019-04-08

## 2019-04-08 ENCOUNTER — INPATIENT (INPATIENT)
Facility: HOSPITAL | Age: 64
LOS: 5 days | DRG: 283 | End: 2019-04-14
Attending: HOSPITALIST | Admitting: INTERNAL MEDICINE
Payer: MEDICARE

## 2019-04-08 VITALS
WEIGHT: 181 LBS | TEMPERATURE: 98 F | RESPIRATION RATE: 18 BRPM | HEART RATE: 93 BPM | DIASTOLIC BLOOD PRESSURE: 69 MMHG | HEIGHT: 71 IN | SYSTOLIC BLOOD PRESSURE: 104 MMHG | OXYGEN SATURATION: 95 %

## 2019-04-08 DIAGNOSIS — I21.4 NON-ST ELEVATION (NSTEMI) MYOCARDIAL INFARCTION: ICD-10-CM

## 2019-04-08 LAB
ALBUMIN SERPL ELPH-MCNC: 3.7 G/DL — SIGNIFICANT CHANGE UP (ref 3.3–5)
ALBUMIN SERPL ELPH-MCNC: 3.7 G/DL — SIGNIFICANT CHANGE UP (ref 3.3–5)
ALP SERPL-CCNC: 92 U/L — SIGNIFICANT CHANGE UP (ref 40–120)
ALP SERPL-CCNC: 99 U/L — SIGNIFICANT CHANGE UP (ref 40–120)
ALT FLD-CCNC: 25 U/L — SIGNIFICANT CHANGE UP (ref 10–45)
ALT FLD-CCNC: 35 U/L — SIGNIFICANT CHANGE UP (ref 10–45)
ANION GAP SERPL CALC-SCNC: 14 MMOL/L — SIGNIFICANT CHANGE UP (ref 5–17)
ANION GAP SERPL CALC-SCNC: 18 MMOL/L — HIGH (ref 5–17)
APTT BLD: 37.6 SEC — HIGH (ref 27.5–36.3)
APTT BLD: 48 SEC — HIGH (ref 27.5–36.3)
AST SERPL-CCNC: 106 U/L — HIGH (ref 10–40)
AST SERPL-CCNC: 185 U/L — HIGH (ref 10–40)
BASOPHILS # BLD AUTO: 0 K/UL — SIGNIFICANT CHANGE UP (ref 0–0.2)
BASOPHILS NFR BLD AUTO: 0.2 % — SIGNIFICANT CHANGE UP (ref 0–2)
BILIRUB SERPL-MCNC: 1.3 MG/DL — HIGH (ref 0.2–1.2)
BILIRUB SERPL-MCNC: 1.7 MG/DL — HIGH (ref 0.2–1.2)
BUN SERPL-MCNC: 12 MG/DL — SIGNIFICANT CHANGE UP (ref 7–23)
BUN SERPL-MCNC: 12 MG/DL — SIGNIFICANT CHANGE UP (ref 7–23)
CALCIUM SERPL-MCNC: 9 MG/DL — SIGNIFICANT CHANGE UP (ref 8.4–10.5)
CALCIUM SERPL-MCNC: 9.3 MG/DL — SIGNIFICANT CHANGE UP (ref 8.4–10.5)
CHLORIDE SERPL-SCNC: 96 MMOL/L — SIGNIFICANT CHANGE UP (ref 96–108)
CHLORIDE SERPL-SCNC: 97 MMOL/L — SIGNIFICANT CHANGE UP (ref 96–108)
CO2 SERPL-SCNC: 18 MMOL/L — LOW (ref 22–31)
CO2 SERPL-SCNC: 21 MMOL/L — LOW (ref 22–31)
CREAT SERPL-MCNC: 0.64 MG/DL — SIGNIFICANT CHANGE UP (ref 0.5–1.3)
CREAT SERPL-MCNC: 0.69 MG/DL — SIGNIFICANT CHANGE UP (ref 0.5–1.3)
EOSINOPHIL # BLD AUTO: 0.1 K/UL — SIGNIFICANT CHANGE UP (ref 0–0.5)
EOSINOPHIL NFR BLD AUTO: 0.4 % — SIGNIFICANT CHANGE UP (ref 0–6)
GAS PNL BLDA: SIGNIFICANT CHANGE UP
GLUCOSE BLDC GLUCOMTR-MCNC: 189 MG/DL — HIGH (ref 70–99)
GLUCOSE SERPL-MCNC: 120 MG/DL — HIGH (ref 70–99)
GLUCOSE SERPL-MCNC: 168 MG/DL — HIGH (ref 70–99)
HCT VFR BLD CALC: 43 % — SIGNIFICANT CHANGE UP (ref 39–50)
HCT VFR BLD CALC: 49.3 % — SIGNIFICANT CHANGE UP (ref 39–50)
HGB BLD-MCNC: 14.6 G/DL — SIGNIFICANT CHANGE UP (ref 13–17)
HGB BLD-MCNC: 16.2 G/DL — SIGNIFICANT CHANGE UP (ref 13–17)
INR BLD: 3.11 RATIO — HIGH (ref 0.88–1.16)
INR BLD: 3.36 RATIO — HIGH (ref 0.88–1.16)
LIDOCAIN IGE QN: 16 U/L — SIGNIFICANT CHANGE UP (ref 7–60)
LYMPHOCYTES # BLD AUTO: 1.5 K/UL — SIGNIFICANT CHANGE UP (ref 1–3.3)
LYMPHOCYTES # BLD AUTO: 7.8 % — LOW (ref 13–44)
MAGNESIUM SERPL-MCNC: 2.1 MG/DL — SIGNIFICANT CHANGE UP (ref 1.6–2.6)
MAGNESIUM SERPL-MCNC: 2.1 MG/DL — SIGNIFICANT CHANGE UP (ref 1.6–2.6)
MCHC RBC-ENTMCNC: 29.2 PG — SIGNIFICANT CHANGE UP (ref 27–34)
MCHC RBC-ENTMCNC: 30.3 PG — SIGNIFICANT CHANGE UP (ref 27–34)
MCHC RBC-ENTMCNC: 32.8 GM/DL — SIGNIFICANT CHANGE UP (ref 32–36)
MCHC RBC-ENTMCNC: 33.9 GM/DL — SIGNIFICANT CHANGE UP (ref 32–36)
MCV RBC AUTO: 89.1 FL — SIGNIFICANT CHANGE UP (ref 80–100)
MCV RBC AUTO: 89.3 FL — SIGNIFICANT CHANGE UP (ref 80–100)
MONOCYTES # BLD AUTO: 1.1 K/UL — HIGH (ref 0–0.9)
MONOCYTES NFR BLD AUTO: 5.6 % — SIGNIFICANT CHANGE UP (ref 2–14)
NEUTROPHILS # BLD AUTO: 16.6 K/UL — HIGH (ref 1.8–7.4)
NEUTROPHILS NFR BLD AUTO: 86 % — HIGH (ref 43–77)
NT-PROBNP SERPL-SCNC: 1256 PG/ML — HIGH (ref 0–300)
PHOSPHATE SERPL-MCNC: 4.7 MG/DL — HIGH (ref 2.5–4.5)
PLATELET # BLD AUTO: 250 K/UL — SIGNIFICANT CHANGE UP (ref 150–400)
PLATELET # BLD AUTO: 303 K/UL — SIGNIFICANT CHANGE UP (ref 150–400)
POTASSIUM SERPL-MCNC: 4.4 MMOL/L — SIGNIFICANT CHANGE UP (ref 3.5–5.3)
POTASSIUM SERPL-MCNC: 6 MMOL/L — HIGH (ref 3.5–5.3)
POTASSIUM SERPL-SCNC: 4.4 MMOL/L — SIGNIFICANT CHANGE UP (ref 3.5–5.3)
POTASSIUM SERPL-SCNC: 6 MMOL/L — HIGH (ref 3.5–5.3)
PROT SERPL-MCNC: 6.9 G/DL — SIGNIFICANT CHANGE UP (ref 6–8.3)
PROT SERPL-MCNC: 8 G/DL — SIGNIFICANT CHANGE UP (ref 6–8.3)
PROTHROM AB SERPL-ACNC: 37.1 SEC — HIGH (ref 10–12.9)
PROTHROM AB SERPL-ACNC: 40.1 SEC — HIGH (ref 10–12.9)
RBC # BLD: 4.82 M/UL — SIGNIFICANT CHANGE UP (ref 4.2–5.8)
RBC # BLD: 5.53 M/UL — SIGNIFICANT CHANGE UP (ref 4.2–5.8)
RBC # FLD: 13.6 % — SIGNIFICANT CHANGE UP (ref 10.3–14.5)
RBC # FLD: 13.6 % — SIGNIFICANT CHANGE UP (ref 10.3–14.5)
SODIUM SERPL-SCNC: 132 MMOL/L — LOW (ref 135–145)
SODIUM SERPL-SCNC: 132 MMOL/L — LOW (ref 135–145)
TROPONIN T, HIGH SENSITIVITY RESULT: 1045 NG/L — HIGH (ref 0–51)
TROPONIN T, HIGH SENSITIVITY RESULT: 1787 NG/L — HIGH (ref 0–51)
WBC # BLD: 10.1 K/UL — SIGNIFICANT CHANGE UP (ref 3.8–10.5)
WBC # BLD: 18.9 K/UL — HIGH (ref 3.8–10.5)
WBC # FLD AUTO: 10.1 K/UL — SIGNIFICANT CHANGE UP (ref 3.8–10.5)
WBC # FLD AUTO: 18.9 K/UL — HIGH (ref 3.8–10.5)

## 2019-04-08 PROCEDURE — 71045 X-RAY EXAM CHEST 1 VIEW: CPT | Mod: 26,59

## 2019-04-08 PROCEDURE — 93458 L HRT ARTERY/VENTRICLE ANGIO: CPT | Mod: 26,GC

## 2019-04-08 PROCEDURE — 99152 MOD SED SAME PHYS/QHP 5/>YRS: CPT | Mod: GC

## 2019-04-08 PROCEDURE — 99284 EMERGENCY DEPT VISIT MOD MDM: CPT | Mod: 25

## 2019-04-08 PROCEDURE — 99223 1ST HOSP IP/OBS HIGH 75: CPT | Mod: GC

## 2019-04-08 PROCEDURE — 93010 ELECTROCARDIOGRAM REPORT: CPT

## 2019-04-08 PROCEDURE — 71046 X-RAY EXAM CHEST 2 VIEWS: CPT | Mod: 26

## 2019-04-08 RX ORDER — BUMETANIDE 0.25 MG/ML
2 INJECTION INTRAMUSCULAR; INTRAVENOUS
Qty: 20 | Refills: 0 | Status: DISCONTINUED | OUTPATIENT
Start: 2019-04-08 | End: 2019-04-12

## 2019-04-08 RX ORDER — ASPIRIN/CALCIUM CARB/MAGNESIUM 324 MG
81 TABLET ORAL DAILY
Qty: 0 | Refills: 0 | Status: DISCONTINUED | OUTPATIENT
Start: 2019-04-08 | End: 2019-04-14

## 2019-04-08 RX ORDER — FUROSEMIDE 40 MG
40 TABLET ORAL ONCE
Qty: 0 | Refills: 0 | Status: COMPLETED | OUTPATIENT
Start: 2019-04-08 | End: 2019-04-08

## 2019-04-08 RX ORDER — ISOSORBIDE MONONITRATE 60 MG/1
1 TABLET, EXTENDED RELEASE ORAL
Qty: 0 | Refills: 0 | COMMUNITY

## 2019-04-08 RX ORDER — NITROGLYCERIN 6.5 MG
10 CAPSULE, EXTENDED RELEASE ORAL
Qty: 50 | Refills: 0 | Status: DISCONTINUED | OUTPATIENT
Start: 2019-04-08 | End: 2019-04-08

## 2019-04-08 RX ORDER — TICAGRELOR 90 MG/1
90 TABLET ORAL
Qty: 0 | Refills: 0 | Status: DISCONTINUED | OUTPATIENT
Start: 2019-04-08 | End: 2019-04-14

## 2019-04-08 RX ORDER — WARFARIN SODIUM 2.5 MG/1
1 TABLET ORAL
Qty: 0 | Refills: 0 | COMMUNITY

## 2019-04-08 RX ORDER — TICAGRELOR 90 MG/1
180 TABLET ORAL ONCE
Qty: 0 | Refills: 0 | Status: COMPLETED | OUTPATIENT
Start: 2019-04-08 | End: 2019-04-08

## 2019-04-08 RX ORDER — ATORVASTATIN CALCIUM 80 MG/1
80 TABLET, FILM COATED ORAL AT BEDTIME
Qty: 0 | Refills: 0 | Status: DISCONTINUED | OUTPATIENT
Start: 2019-04-08 | End: 2019-04-14

## 2019-04-08 RX ORDER — CAPTOPRIL 12.5 MG/1
6.25 TABLET ORAL THREE TIMES A DAY
Qty: 0 | Refills: 0 | Status: DISCONTINUED | OUTPATIENT
Start: 2019-04-08 | End: 2019-04-09

## 2019-04-08 RX ORDER — CLOPIDOGREL BISULFATE 75 MG/1
1 TABLET, FILM COATED ORAL
Qty: 0 | Refills: 0 | COMMUNITY

## 2019-04-08 RX ORDER — BUMETANIDE 0.25 MG/ML
2 INJECTION INTRAMUSCULAR; INTRAVENOUS ONCE
Qty: 0 | Refills: 0 | Status: COMPLETED | OUTPATIENT
Start: 2019-04-08 | End: 2019-04-08

## 2019-04-08 RX ORDER — ASPIRIN/CALCIUM CARB/MAGNESIUM 324 MG
243 TABLET ORAL ONCE
Qty: 0 | Refills: 0 | Status: COMPLETED | OUTPATIENT
Start: 2019-04-08 | End: 2019-04-08

## 2019-04-08 RX ORDER — FUROSEMIDE 40 MG
10 TABLET ORAL
Qty: 500 | Refills: 0 | Status: DISCONTINUED | OUTPATIENT
Start: 2019-04-08 | End: 2019-04-08

## 2019-04-08 RX ORDER — FUROSEMIDE 40 MG
20 TABLET ORAL
Qty: 500 | Refills: 0 | Status: DISCONTINUED | OUTPATIENT
Start: 2019-04-08 | End: 2019-04-08

## 2019-04-08 RX ADMIN — BUMETANIDE 2 MILLIGRAM(S): 0.25 INJECTION INTRAMUSCULAR; INTRAVENOUS at 22:25

## 2019-04-08 RX ADMIN — Medication 243 MILLIGRAM(S): at 14:08

## 2019-04-08 RX ADMIN — TICAGRELOR 180 MILLIGRAM(S): 90 TABLET ORAL at 15:09

## 2019-04-08 NOTE — ED ADULT NURSE REASSESSMENT NOTE - NS ED NURSE REASSESS COMMENT FT1
pt ordered for 40mg of IV lasix for pulmonary edema, pt b/p 96/70, MD Davila notified and held the lasix, pt A&Ox3, breathing spontaneous, unlabored w/o distress on room air, CM NSR, awaits dispo

## 2019-04-08 NOTE — ED PROVIDER NOTE - PMH
CAD (coronary artery disease)    Chronic systolic congestive heart failure  EF15%-35%  GERD (gastroesophageal reflux disease)    H/O hyperlipidemia    HTN (hypertension)    Intermittent claudication    Joint pain, foot  in toes  Left ventricular thrombus    Moderate to severe pulmonary hypertension    PAD (peripheral artery disease)    Rheumatoid arthritis    Severe mitral regurgitation    STEMI (ST elevation myocardial infarction)  AWMI with cardiogenic shock 9/11/11- with emergent stent to LAD/impella at [facility]  Systolic heart failure  EF 29% via TTE 4/15

## 2019-04-08 NOTE — ED ADULT NURSE NOTE - PMH
CAD (coronary artery disease)    Chronic systolic congestive heart failure  EF15%-35%  GERD (gastroesophageal reflux disease)    H/O hyperlipidemia    HTN (hypertension)    Intermittent claudication    Joint pain, foot  in toes  Left ventricular thrombus    Moderate to severe pulmonary hypertension    PAD (peripheral artery disease)    Rheumatoid arthritis    Severe mitral regurgitation    STEMI (ST elevation myocardial infarction)  AWMI with cardiogenic shock 9/11/11- with emergent stent to LAD/impella at [facility]  Systolic heart failure  EF 29% via TTE 4/15

## 2019-04-08 NOTE — ED ADULT NURSE NOTE - OBJECTIVE STATEMENT
65 y/o M pt w/ pmh of AICD, CAD, HTN, HLD, MI with stent, PAD, GERD, RA, present to ED for chest pain and SOB, pt report he always have intermittent dull chest pain, today pt had sharp, constant non exertional chest pain that radiates to left and right arm, along with SOB and diaphoretic, lasted about 3 hours, now having dull chest pain, on exam pt A&Ox3, breathing spontaneous, unlabored w/o distress on room air, NAD, lungs b/l CTA, peripheral pulse strong and present, denies dizziness, N/V/D, dysuria, hematuria, melena, EKG done and given to MD, placed on CM NSR, sent eval by MD, heplock placed, labs drawn and sent

## 2019-04-08 NOTE — CHART NOTE - NSCHARTNOTEFT_GEN_A_CORE
64M w/ hx CHF  EF 20%, AICD [employer]  2014 , HTN, HLD, GERD, Rheumatoid arthritis, PAD, CAD/  MI w/ 9  stents, anterior STEMI c/b cardiogenic shock req restent LAD (in-stent thrombosis) and Impella (9/11/2011), hx of LV thrombus (On Coumadin) , 4/15 pt had NSTEMI with RODRIGO to pCX with Impella support.  Pt came to ER today with chest pain. Cath done showing 80% occlusion to the LAD, 100% occlusion of the RCA. sp cath, patient in resp distress, given Lasix 40mg IV x 1 with plans to start a gtt and transferred to [facility].    Tonight an RRT was called for respiratory distress. Pt hypertensive, likely secondary to flash pulmonary edema.  Diffuse crackles. Bipap ordered. Patient given additional 80mg IV Lasix x 1, gtt started. Ipllai placed.  BP initially in 150s, rapidly increasing to the 180s.  Nitro gtt started for BP control per Dr. Bravo. Patient with slight decrease in respiratory distress. Patient alert and oriented throughout. Plan to continue bipap and diuresis and transfer to CCU.    While waiting for CCU bed, patient became hypotensive to the 70s.  Briefly held NTG without an increase in SBP.  Lasix held until SBP in 80s, then restarted.  SBP slowly uptrending to 120 and restarted NTG. BIPAP settings optimized, achieving VT > 500. however, no improvement noted in respiratory effort. Patient becoming increasingly diaphoretic, remaining tachypneac to the 40s.  Multiple attempts to get ABG unsuccessful (one wrist with radial band still in place).  Dr. Bravo at bedside to re-eval patient.  Bumex 2mg IV x 1 given.  Would like to continue BIPAP for now and continue to avoid intubation at this time.  CXR done showing diffuse pulmonary edema. Pillai changed in case of possible obstruction, though flushing easily.    Post Bumex, patient appears to be diuresing better.  Resp rate dropping to 30s.  No longer diaphoretic.  Noted to have a decrease in accessory muscle use. Continuing Lasix gtt @10mg/hr and Nitro to maintain SBP . Lab work to be sent. Remain awaiting CCU bed.  Stable for transfer.      Goldie Silva NP  [facility]/RRT

## 2019-04-08 NOTE — CHART NOTE - NSCHARTNOTEFT_GEN_A_CORE
CCU Accept Note    Transfer from:   ( X  )[facility]    Accepting physican: Dr. Love Moreno    HPI: 64M w/ hx CHF  EF 20%, AICD [employer]  2014 , HTN, HLD, GERD, Rheumatoid arthritis, PAD, CAD/  MI w/ 9  stents, anterior STEMI c/b cardiogenic shock req restent LAD (in-stent thrombosis) and Impella (9/11/2011), hx of LV thrombus (On Coumadin) , 4/15 pt had NSTEMI with RODRIGO to pCX with Impella support.  Pt came to ER today with chest pain.  Pt was standing waiting to go to his EP doctors appointment when the pain started at 9AM .  Was associated with some SOB and inability to breathe in.  Symptoms have persisted til; now although they are waning in severity. Patient was taken for coronary angiogram  by Dr. Ferro. Cath results showed ____.     SUBJECTIVE DATA:    OBJECTIVE DATA:    Vital Signs Last 24 Hrs  T(C): 36.5 (08 Apr 2019 21:02), Max: 36.7 (08 Apr 2019 18:10)  T(F): 97.7 (08 Apr 2019 21:02), Max: 98 (08 Apr 2019 18:10)  HR: 112 (08 Apr 2019 21:02) (72 - 112)  BP: 92/75 (08 Apr 2019 21:02) (92/75 - 108/80)  BP(mean): 94 (08 Apr 2019 18:10) (94 - 94)  RR: 24 (08 Apr 2019 21:02) (15 - 24)  SpO2: 97% (08 Apr 2019 21:02) (95% - 97%)  I&O's Summary      Allergies:      MEDICATIONS  (STANDING):  buMETAnide Injectable 2 milliGRAM(s) IV Push once  furosemide Infusion 20 mG/Hr (10 mL/Hr) IV Continuous <Continuous>  nitroglycerin  Infusion 10 MICROgram(s)/Min (3 mL/Hr) IV Continuous <Continuous>    MEDICATIONS  (PRN):      LABS                                            14.6                  Neurophils% (auto):   x      (04-08 @ 14:05):    10.1 )-----------(250          Lymphocytes% (auto):  x                                             43.0                   Eosinphils% (auto):   x        Manual%: Neutrophils x    ; Lymphocytes x    ; Eosinophils x    ; Bands%: x    ; Blasts x                                    132    |  97     |  12                  Calcium: 9.3   / iCa: x      (04-08 @ 14:05)    ----------------------------<  120       Magnesium: 2.1                              4.4     |  21     |  0.69             Phosphorous: x        TPro  6.9    /  Alb  3.7    /  TBili  1.3    /  DBili  x      /  AST  106    /  ALT  25     /  AlkPhos  92     08 Apr 2019 14:05    ( 04-08 @ 14:05 )   PT: 37.1 sec;   INR: 3.11 ratio  aPTT: 37.6 sec      EKG:  Telemetry:  Echo:  Cardiac Cath:  Stress Test:  Imaging    ASSESSMENT & PLAN: CCU Accept Note    Transfer from:   ( X  )[facility]    Accepting physican: Dr. Love Moreno    HPI: 64M w/ hx CHF  EF 20%, AICD [employer]  2014 , HTN, HLD, GERD, Rheumatoid arthritis, PAD, CAD/  MI w/ 9  stents, anterior STEMI c/b cardiogenic shock req restent LAD (in-stent thrombosis) and Impella (9/11/2011), hx of LV thrombus (On Coumadin) , 4/15 pt had NSTEMI with RODRIGO to pCX with Impella support.  Pt came to ER today with chest pain.  Pt was standing waiting to go to his EP doctors appointment when the pain started at 9AM .  Was associated with some SOB and inability to breathe in.  Symptoms have persisted til; now although they are waning in severity. Patient was taken for coronary angiogram  by Dr. Ferro. Cath results showed 80% occlusion to the LAD, 100% occlusion of the RCA. Lasix 40mg IV x 1 with plans to start a gtt and transferred to [facility].    Tonight an RRT was called for respiratory distress. Pt hypertensive, likely secondary to flash pulmonary edema.  Diffuse crackles. Bipap ordered. Patient given additional 80mg IV Lasix x 1, gtt started. Pillai placed.  BP initially in 150s, rapidly increasing to the 180s.  Nitro gtt started for BP control per Dr. Bravo. Patient with slight decrease in respiratory distress. Patient alert and oriented throughout. Plan to continue bipap and diuresis and transfer to CCU.    While waiting for CCU bed, patient became hypotensive to the 70s.  Briefly held NTG without an increase in SBP.  Lasix held until SBP in 80s, then restarted.  SBP slowly uptrending to 120 and restarted NTG. BIPAP settings optimized, achieving VT > 500. however, no improvement noted in respiratory effort. Patient becoming increasingly diaphoretic, remaining tachypneac to the 40s.  Multiple attempts to get ABG unsuccessful (one wrist with radial band still in place).  Dr. Bravo at bedside to re-eval patient.  Bumex 2mg IV x 1 given.  Would like to continue BIPAP for now and continue to avoid intubation at this time.  CXR done showing diffuse pulmonary edema. Pillai changed in case of possible obstruction, though flushing easily.    Post Bumex, patient appears to be diuresing better.  Resp rate dropping to 30s.  No longer diaphoretic.  Noted to have a decrease in accessory muscle use. Continuing Lasix gtt @10mg/hr and Nitro to maintain SBP .       SUBJECTIVE DATA:    OBJECTIVE DATA:    Vital Signs Last 24 Hrs  T(C): 36.5 (08 Apr 2019 21:02), Max: 36.7 (08 Apr 2019 18:10)  T(F): 97.7 (08 Apr 2019 21:02), Max: 98 (08 Apr 2019 18:10)  HR: 112 (08 Apr 2019 21:02) (72 - 112)  BP: 92/75 (08 Apr 2019 21:02) (92/75 - 108/80)  BP(mean): 94 (08 Apr 2019 18:10) (94 - 94)  RR: 24 (08 Apr 2019 21:02) (15 - 24)  SpO2: 97% (08 Apr 2019 21:02) (95% - 97%)  I&O's Summary      Allergies:      MEDICATIONS  (STANDING):  buMETAnide Injectable 2 milliGRAM(s) IV Push once  furosemide Infusion 20 mG/Hr (10 mL/Hr) IV Continuous <Continuous>  nitroglycerin  Infusion 10 MICROgram(s)/Min (3 mL/Hr) IV Continuous <Continuous>    MEDICATIONS  (PRN):      LABS                                            14.6                  Neurophils% (auto):   x      (04-08 @ 14:05):    10.1 )-----------(250          Lymphocytes% (auto):  x                                             43.0                   Eosinphils% (auto):   x        Manual%: Neutrophils x    ; Lymphocytes x    ; Eosinophils x    ; Bands%: x    ; Blasts x                                    132    |  97     |  12                  Calcium: 9.3   / iCa: x      (04-08 @ 14:05)    ----------------------------<  120       Magnesium: 2.1                              4.4     |  21     |  0.69             Phosphorous: x        TPro  6.9    /  Alb  3.7    /  TBili  1.3    /  DBili  x      /  AST  106    /  ALT  25     /  AlkPhos  92     08 Apr 2019 14:05    ( 04-08 @ 14:05 )   PT: 37.1 sec;   INR: 3.11 ratio  aPTT: 37.6 sec      EKG:  Telemetry:  Echo:  Cardiac Cath:  Stress Test:  Imaging    ASSESSMENT & PLAN: CCU Accept Note    Transfer from:   ( X  )[facility]    Accepting physican: Dr. Love Moreno    HPI: 64M w/ hx CHF  EF 20%, AICD [employer]  2014 , HTN, HLD, GERD, Rheumatoid arthritis, PAD, CAD/  MI w/ 9  stents, anterior STEMI c/b cardiogenic shock req restent LAD (in-stent thrombosis) and Impella (9/11/2011), hx of LV thrombus (On Coumadin) , 4/15 pt had NSTEMI with RODRIGO to pCX with Impella support.  Pt came to ER today with chest pain.  Pt was standing waiting to go to his EP doctors appointment when the pain started at 9AM .  Was associated with some SOB and inability to breathe in.  Symptoms have persisted til; now although they are waning in severity. Patient was taken for coronary angiogram  by Dr. Ferro. Cath results showed 80% occlusion to the LAD, 100% occlusion of the RCA. Lasix 40mg IV x 1 with plans to start a gtt and transferred to [facility].    Tonight an RRT was called for respiratory distress. Pt hypertensive, likely secondary to flash pulmonary edema.  Diffuse crackles. Bipap ordered. Patient given additional 80mg IV Lasix x 1, gtt started. Pillai placed.  BP initially in 150s, rapidly increasing to the 180s.  Nitro gtt started for BP control per Dr. Bravo. Patient with slight decrease in respiratory distress. Patient alert and oriented throughout. Plan to continue bipap and diuresis and transfer to CCU.    While waiting for CCU bed, patient became hypotensive to the 70s.  Briefly held NTG without an increase in SBP.  Lasix held until SBP in 80s, then restarted.  SBP slowly uptrending to 120 and restarted NTG. BIPAP settings optimized, achieving VT > 500. however, no improvement noted in respiratory effort. Patient becoming increasingly diaphoretic, remaining tachypneac to the 40s.  Multiple attempts to get ABG unsuccessful (one wrist with radial band still in place).  Dr. Bravo at bedside to re-eval patient.  Bumex 2mg IV x 1 given.  Would like to continue BIPAP for now and continue to avoid intubation at this time.  CXR done showing diffuse pulmonary edema. Pillai changed in case of possible obstruction, though flushing easily.    Post Bumex, patient appears to be diuresing better.  Resp rate dropping to 30s.  No longer diaphoretic.  Noted to have a decrease in accessory muscle use. Continuing Lasix gtt @10mg/hr and Nitro to maintain SBP .       SUBJECTIVE DATA:    OBJECTIVE DATA:    Vital Signs Last 24 Hrs  T(C): 36.5 (08 Apr 2019 21:02), Max: 36.7 (08 Apr 2019 18:10)  T(F): 97.7 (08 Apr 2019 21:02), Max: 98 (08 Apr 2019 18:10)  HR: 112 (08 Apr 2019 21:02) (72 - 112)  BP: 92/75 (08 Apr 2019 21:02) (92/75 - 108/80)  BP(mean): 94 (08 Apr 2019 18:10) (94 - 94)  RR: 24 (08 Apr 2019 21:02) (15 - 24)  SpO2: 97% (08 Apr 2019 21:02) (95% - 97%)  I&O's Summary      Allergies:      MEDICATIONS  (STANDING):  buMETAnide Injectable 2 milliGRAM(s) IV Push once  furosemide Infusion 20 mG/Hr (10 mL/Hr) IV Continuous <Continuous>  nitroglycerin  Infusion 10 MICROgram(s)/Min (3 mL/Hr) IV Continuous <Continuous>    MEDICATIONS  (PRN):      LABS                                            14.6                  Neurophils% (auto):   x      (04-08 @ 14:05):    10.1 )-----------(250          Lymphocytes% (auto):  x                                             43.0                   Eosinphils% (auto):   x        Manual%: Neutrophils x    ; Lymphocytes x    ; Eosinophils x    ; Bands%: x    ; Blasts x                                    132    |  97     |  12                  Calcium: 9.3   / iCa: x      (04-08 @ 14:05)    ----------------------------<  120       Magnesium: 2.1                              4.4     |  21     |  0.69             Phosphorous: x        TPro  6.9    /  Alb  3.7    /  TBili  1.3    /  DBili  x      /  AST  106    /  ALT  25     /  AlkPhos  92     08 Apr 2019 14:05    ( 04-08 @ 14:05 )   PT: 37.1 sec;   INR: 3.11 ratio  aPTT: 37.6 sec      Cardiac Cath:    < from: Cardiac Cath Lab - Adult (01.09.17 @ 16:14) >    VENTRICLES: No left ventriculogram was performed.  CORONARY VESSELS: The coronary circulation is right dominant.  LM:   --  LM: Normal.  LAD:   --  Proximal LAD: There was a diffuse 10 % stenosis at the site of a  prior angioplasty with stent placement.  --  Mid LAD: There was a diffuse 10 % stenosis at the site of a prior  angioplasty with stent placement. There was DIANA grade 3 flow through the  vessel (brisk flow).  CX:   --  Proximal circumflex: There was a diffuse 10 % stenosis. There was  DIANA grade 3 flow through the vessel (brisk flow).  RCA:   --  Proximal RCA: There was a diffuse 10 % stenosis at the site of a  prior angioplasty with stent placement.  --  Mid RCA: There was a diffuse 10 % stenosis at the site of a prior  angioplasty with stent placement.  COMPLICATIONS: There were no complications.  DIAGNOSTIC RECOMMENDATIONS: Medical management is recommended.    Echo:   TTE with Doppler (w/Cont) (11.16.18 @ 09:47    Fractional short: 16 %  EF (Corbin Rule): 23 %    Conclusions:  1. Tethered mitral valve leaflets with normal opening.  Severe mitral regurgitation.  2. Left ventricular enlargement.  3. Severe segmental left ventricular systolic dysfunction.  The apex is dyskinetic.  The mid to apical septum and  lateral wall are severely hypokinetic.  The inferior and  inferolateral walls are akinetic.   Endocardial  visualization enhanced with intravenous injection of  Ultrasonic Enhancing Agent (Definity). Left ventricular  thrombus not visualized.  4. Severe diastolic dysfunction (Stage III).  5. Moderate right atrial enlargement.  6. Right ventricular enlargement with normal right  ventricular systolic function.   A device wire is noted in  the right heart.  7. Estimated pulmonary artery systolic pressure equals 58  mm Hg, assuming right atrial pressure equals 8 mm Hg,  consistent with moderate pulmonary pressures.      ASSESSMENT & PLAN:    64M w/ hx CHF  EF 20%, AICD [employer]  2014 , HTN, HLD, GERD, Rheumatoid arthritis, PAD, CAD/  MI w/ 9  stents, anterior STEMI c/b cardiogenic shock req restent LAD (in-stent thrombosis) and Impella (9/11/2011), hx of LV thrombus (On Coumadin) , 4/15 pt had NSTEMI with RODRIGO to pCX with Impella support admitted for STEMI w/ cath showing 80% occlusion to the LAD, 100% occlusion of the RCA course c/b hypertensive emergency leading to flash pulmonary edema, now in cardiogenic shock and acute on chronic decompensated HF.    Neuro:  AAO x 4      CV:  new onset STEMI with 80% occlusion to the LAD, 100% occlusion of the, LVEDP ____  s/p asa and brillinta load, c/w asa 81mg and brilinta 90 mg BID  c/w atorvastatin, recheck hga1c, lipid profile    Cardiogenic shock secondary to acute on chronic decompensated HF  - c/w lasix 20 cc/hr, last known EF 23% in 11/2018 with severe segmental LV dysfunction  - s/p lasix 80 IVP x 1 and bumex 2mg IVP x1  - repeat TTE  - strict I&O, daily standing weights  - c/w bipap PRN for respiratory distress    hx of LV thrombus  - prior echo was negative for LV thrombus  - currently inr supratherapeutic, hold coumadin      Pulm:  Acute respiratory distress in setting of pulmonary vascular congestion  - c/w BIPAP to reduce preload, give bipap break anisa for AM meds    GI:  NPO while on BIPAP    Endo:  last A1c 6.3 in 2016  recheck a1c, tsh    Renal:  minimal UOP due to shock state    DVT ppx:  hold i/s/o supratherapeutic INR    Ariadne Reddy PGY-2  Pager # [phone]/ [phone] CCU Accept Note    Transfer from:   ( X  )[facility]    Accepting physican: Dr. Love Moreno    HPI: 64M w/ hx CHF  EF 20%, AICD [employer]  2014 , HTN, HLD, GERD, Rheumatoid arthritis, PAD, CAD/  MI w/ 9  stents, anterior STEMI c/b cardiogenic shock req restent LAD (in-stent thrombosis) and Impella (9/11/2011), hx of LV thrombus (On Coumadin) , 4/15 pt had NSTEMI with RODRIGO to pCX with Impella support.  Pt came to ER today with chest pain.  Pt was standing waiting to go to his EP doctors appointment when the pain started at 9AM .  Was associated with some SOB and inability to breathe in.  Symptoms have persisted til; now although they are waning in severity. Patient was taken for coronary angiogram  by Dr. Ferro. Cath via Right radial artery showed 80% occlusion to the osteal LAD, 100% occlusion of distal RCA and 50% occlusion, EDP 48. Lasix 40mg IV x 1 with plans to start a gtt and transferred to [facility].    Tonight an RRT was called for respiratory distress. Pt hypertensive, likely secondary to flash pulmonary edema.  Diffuse crackles. Bipap ordered. Patient given additional 80mg IV Lasix x 1, gtt started. Pillai placed.  BP initially in 150s, rapidly increasing to the 180s.  Nitro gtt started for BP control per Dr. Bravo. Patient with slight decrease in respiratory distress. Patient alert and oriented throughout. Plan to continue bipap and diuresis and transfer to CCU.    While waiting for CCU bed, patient became hypotensive to the 70s.  Briefly held NTG without an increase in SBP.  Lasix held until SBP in 80s, then restarted.  SBP slowly uptrending to 120 and restarted NTG. BIPAP settings optimized, achieving VT > 500. however, no improvement noted in respiratory effort. Patient becoming increasingly diaphoretic, remaining tachypnea to the 40s.  Multiple attempts to get ABG unsuccessful (one wrist with radial band still in place).  Dr. Bravo at bedside to re-eval patient.  Bumex 2mg IV x 1 given.  Would like to continue BIPAP for now and continue to avoid intubation at this time.  CXR done showing diffuse pulmonary edema. Pillai changed in case of possible obstruction, though flushing easily.    Post Bumex, patient appears to be diuresing better.  Resp rate dropping to 30s.  No longer diaphoretic.  Noted to have a decrease in accessory muscle use.     On my interview patient is in mild respiratory distress. c/o 4/10 dull non radiating left chest pain, pt notes chest pain has improved dramatically from AM. Notes SOB improved on bipap and bumex. Denied other ROS.    SUBJECTIVE DATA:    OBJECTIVE DATA:    Vital Signs Last 24 Hrs  T(C): 36.5 (08 Apr 2019 21:02), Max: 36.7 (08 Apr 2019 18:10)  T(F): 97.7 (08 Apr 2019 21:02), Max: 98 (08 Apr 2019 18:10)  HR: 112 (08 Apr 2019 21:02) (72 - 112)  BP: 92/75 (08 Apr 2019 21:02) (92/75 - 108/80)  BP(mean): 94 (08 Apr 2019 18:10) (94 - 94)  RR: 24 (08 Apr 2019 21:02) (15 - 24)  SpO2: 97% (08 Apr 2019 21:02) (95% - 97%)  I&O's Summary    General: mild respiratory distress on bipap  Neurology: A&Ox3, nonfocal, CROWELL x 4  Eyes: PERRLA/ EOMI, Gross vision intact  ENT/Neck: Neck supple, trachea midline, No JVD, Gross hearing intact  Respiratory: bibasilar crackles  CV: RRR, S1S2, no murmurs, rubs or gallops  Abdominal: Soft, NT, ND +BS,   Extremities: No edema, + peripheral pulses  Skin: No Rashes, Hematoma, Ecchymosis      MEDICATIONS  (STANDING):  buMETAnide Injectable 2 milliGRAM(s) IV Push once  furosemide Infusion 20 mG/Hr (10 mL/Hr) IV Continuous <Continuous>  nitroglycerin  Infusion 10 MICROgram(s)/Min (3 mL/Hr) IV Continuous <Continuous>    MEDICATIONS  (PRN):      LABS                                            14.6                  Neurophils% (auto):   x      (04-08 @ 14:05):    10.1 )-----------(250          Lymphocytes% (auto):  x                                             43.0                   Eosinphils% (auto):   x        Manual%: Neutrophils x    ; Lymphocytes x    ; Eosinophils x    ; Bands%: x    ; Blasts x                                    132    |  97     |  12                  Calcium: 9.3   / iCa: x      (04-08 @ 14:05)    ----------------------------<  120       Magnesium: 2.1                              4.4     |  21     |  0.69             Phosphorous: x        TPro  6.9    /  Alb  3.7    /  TBili  1.3    /  DBili  x      /  AST  106    /  ALT  25     /  AlkPhos  92     08 Apr 2019 14:05    ( 04-08 @ 14:05 )   PT: 37.1 sec;   INR: 3.11 ratio  aPTT: 37.6 sec      Cardiac Cath:    < from: Cardiac Cath Lab - Adult (01.09.17 @ 16:14) >    VENTRICLES: No left ventriculogram was performed.  CORONARY VESSELS: The coronary circulation is right dominant.  LM:   --  LM: Normal.  LAD:   --  Proximal LAD: There was a diffuse 10 % stenosis at the site of a  prior angioplasty with stent placement.  --  Mid LAD: There was a diffuse 10 % stenosis at the site of a prior  angioplasty with stent placement. There was DIANA grade 3 flow through the  vessel (brisk flow).  CX:   --  Proximal circumflex: There was a diffuse 10 % stenosis. There was  DIANA grade 3 flow through the vessel (brisk flow).  RCA:   --  Proximal RCA: There was a diffuse 10 % stenosis at the site of a  prior angioplasty with stent placement.  --  Mid RCA: There was a diffuse 10 % stenosis at the site of a prior  angioplasty with stent placement.  COMPLICATIONS: There were no complications.  DIAGNOSTIC RECOMMENDATIONS: Medical management is recommended.    Echo:   TTE with Doppler (w/Cont) (11.16.18 @ 09:47    Fractional short: 16 %  EF (Corbin Rule): 23 %    Conclusions:  1. Tethered mitral valve leaflets with normal opening.  Severe mitral regurgitation.  2. Left ventricular enlargement.  3. Severe segmental left ventricular systolic dysfunction.  The apex is dyskinetic.  The mid to apical septum and  lateral wall are severely hypokinetic.  The inferior and  inferolateral walls are akinetic.   Endocardial  visualization enhanced with intravenous injection of  Ultrasonic Enhancing Agent (Definity). Left ventricular  thrombus not visualized.  4. Severe diastolic dysfunction (Stage III).  5. Moderate right atrial enlargement.  6. Right ventricular enlargement with normal right  ventricular systolic function.   A device wire is noted in  the right heart.  7. Estimated pulmonary artery systolic pressure equals 58  mm Hg, assuming right atrial pressure equals 8 mm Hg,  consistent with moderate pulmonary pressures.      ASSESSMENT & PLAN:    64M w/ hx CHF  EF 20%, AICD [employer]  2014 , HTN, HLD, GERD, Rheumatoid arthritis, PAD, CAD/  MI w/ 9  stents, anterior STEMI c/b cardiogenic shock req restent LAD (in-stent thrombosis) and Impella (9/11/2011), hx of LV thrombus (On Coumadin) , 4/15 pt had NSTEMI with RODRIGO to pCX with Impella support admitted for STEMI w/ cath showing 80% occlusion to the LAD, 100% occlusion of the RCA course c/b hypertensive emergency leading to flash pulmonary edema, now in cardiogenic shock and acute on chronic decompensated HF.    Neuro:  AAO x 4      CV:  new onset STEMI with coronary angiogram that showed 80%osteal LAD, 100% dRCA, 50% prox circ, LVEDP 48  s/p asa and brillinta load, c/w asa 81mg and brilinta 90 mg BID  c/w atorvastatin, recheck hga1c, lipid profile  - start heparin gtt after radial band is removed and INR < 2  - cont to trend cardiac enzymes until peaked    Cardiogenic shock secondary to acute on chronic decompensated HF  - c/w bumex 2mg/hr gtt, last known EF 23% in 11/2018 with severe segmental LV dysfunction, elevated pro-BNP  - s/p lasix 80 IVP x 1 and bumex 2mg IVP x1  - start captopril 6.25 BID, hold beta blocker i/s/o ADHF  - repeat TTE  - strict I&O, daily standing weights  - c/w bipap PRN for respiratory distress    hx of LV thrombus  - prior echo was negative for LV thrombus  - currently inr supratherapeutic, hold coumadin      Pulm:  Acute respiratory distress in setting of pulmonary vascular congestion  - c/w BIPAP to reduce preload, give bipap break anisa for AM meds  ABG consistent with respiratory and metabolic acidosis  -cont to tend q6h    GI:  NPO while on BIPAP    Endo:  last A1c 6.3 in 2016  recheck a1c, tsh    Renal:  UOP improved after bumex,     DVT ppx:  hold i/s/o supratherapeutic INR    Ariadne Reddy PGY-2  Pager # [phone]/ [phone] CCU Accept Note    Transfer from:   ( X  )[facility]    Accepting physican: Dr. Love Moreno    HPI: 64M w/ hx CHF  EF 20%, AICD [employer]  2014 , HTN, HLD, GERD, Rheumatoid arthritis, PAD, CAD/  MI w/ 9  stents, anterior STEMI c/b cardiogenic shock req restent LAD (in-stent thrombosis) and Impella (9/11/2011), hx of LV thrombus (On Coumadin) , 4/15 pt had NSTEMI with RODRIGO to pCX with Impella support.  Pt came to ER today with chest pain.  Pt was standing waiting to go to his EP doctors appointment when the pain started at 9AM .  Was associated with some SOB and inability to breathe in.  Symptoms have persisted til; now although they are waning in severity. Patient was taken for coronary angiogram  by Dr. Ferro. Cath via Right radial artery showed 80% occlusion to the osteal LAD, 100% occlusion of distal RCA and 50% occlusion, EDP 48. Lasix 40mg IV x 1 with plans to start a gtt and transferred [facility].    Tonight an RRT was called for respiratory distress. Pt hypertensive, likely secondary to flash pulmonary edema.  Diffuse crackles. Bipap ordered. Patient given additional 80mg IV Lasix x 1, gtt started. Pillai placed.  BP initially in 150s, rapidly increasing to the 180s.  Nitro gtt started for BP control per Dr. Bravo. Patient with slight decrease in respiratory distress. Patient alert and oriented throughout. Plan to continue bipap and diuresis and transfer to CCU.    While waiting for CCU bed, patient became hypotensive to the 70s.  Briefly held NTG without an increase in SBP.  Lasix held until SBP in 80s, then restarted.  SBP slowly uptrending to 120 and restarted NTG. BIPAP settings optimized, achieving VT > 500. however, no improvement noted in respiratory effort. Patient becoming increasingly diaphoretic, remaining tachypnea to the 40s.  Multiple attempts to get ABG unsuccessful (one wrist with radial band still in place).  Dr. Bravo at bedside to re-eval patient.  Bumex 2mg IV x 1 given.  Would like to continue BIPAP for now and continue to avoid intubation at this time.  CXR done showing diffuse pulmonary edema. Pillai changed in case of possible obstruction, though flushing easily.    Post Bumex, patient appears to be diuresing better.  Resp rate dropping to 30s.  No longer diaphoretic.  Noted to have a decrease in accessory muscle use.     On my interview patient is in mild respiratory distress. c/o 4/10 dull non radiating left chest pain, pt notes chest pain has improved dramatically from AM. Notes SOB improved on bipap and bumex. Denied other ROS.    SUBJECTIVE DATA:    Home Medications:  carvedilol 6.25 mg oral tablet: 1 tab(s) orally every 12 hours (08 Apr 2019 18:16)  clopidogrel 75 mg oral tablet: 1 tab(s) orally once a day (in the morning) (08 Apr 2019 18:16)  digoxin 125 mcg (0.125 mg) oral tablet: 1 tab(s) orally once a day (08 Apr 2019 18:16)  folic acid 1 mg oral tablet: 1 tab(s) orally once a day (08 Apr 2019 18:16)  furosemide 40 mg oral tablet: 1 tab(s) orally once a day (in the evening) (08 Apr 2019 18:16)  isosorbide mononitrate 120 mg oral tablet, extended release: 1 tab(s) orally 2 times a day (08 Apr 2019 18:16)  lisinopril 5 mg oral tablet: 1 tab(s) orally once a day (in the morning) (08 Apr 2019 18:16)  methotrexate 2.5 mg oral tablet: 8 tab(s) orally once a week  MONDAYS  (08 Apr 2019 18:16)  rosuvastatin 40 mg oral tablet: 1 tab(s) orally once a day (at bedtime) (08 Apr 2019 18:16)  spironolactone 25 mg oral tablet: 1 tab(s) orally once a day (in the morning) (08 Apr 2019 18:16)  warfarin 5 mg oral tablet: 1 tab(s) orally once a day (at bedtime) (08 Apr 2019 18:16)        OBJECTIVE DATA:    Vital Signs Last 24 Hrs  T(C): 36.5 (08 Apr 2019 21:02), Max: 36.7 (08 Apr 2019 18:10)  T(F): 97.7 (08 Apr 2019 21:02), Max: 98 (08 Apr 2019 18:10)  HR: 112 (08 Apr 2019 21:02) (72 - 112)  BP: 92/75 (08 Apr 2019 21:02) (92/75 - 108/80)  BP(mean): 94 (08 Apr 2019 18:10) (94 - 94)  RR: 24 (08 Apr 2019 21:02) (15 - 24)  SpO2: 97% (08 Apr 2019 21:02) (95% - 97%)  I&O's Summary    General: mild respiratory distress on bipap  Neurology: A&Ox3, nonfocal, CROWELL x 4  Eyes: PERRLA/ EOMI, Gross vision intact  ENT/Neck: Neck supple, trachea midline, No JVD, Gross hearing intact  Respiratory: bibasilar crackles  CV: RRR, S1S2, no murmurs, rubs or gallops  Abdominal: Soft, NT, ND +BS,   Extremities: No edema, + peripheral pulses  Skin: No Rashes, Hematoma, Ecchymosis      MEDICATIONS  (STANDING):  buMETAnide Injectable 2 milliGRAM(s) IV Push once  furosemide Infusion 20 mG/Hr (10 mL/Hr) IV Continuous <Continuous>  nitroglycerin  Infusion 10 MICROgram(s)/Min (3 mL/Hr) IV Continuous <Continuous>    MEDICATIONS  (PRN):      LABS                                            14.6                  Neurophils% (auto):   x      (04-08 @ 14:05):    10.1 )-----------(250          Lymphocytes% (auto):  x                                             43.0                   Eosinphils% (auto):   x        Manual%: Neutrophils x    ; Lymphocytes x    ; Eosinophils x    ; Bands%: x    ; Blasts x                                    132    |  97     |  12                  Calcium: 9.3   / iCa: x      (04-08 @ 14:05)    ----------------------------<  120       Magnesium: 2.1                              4.4     |  21     |  0.69             Phosphorous: x        TPro  6.9    /  Alb  3.7    /  TBili  1.3    /  DBili  x      /  AST  106    /  ALT  25     /  AlkPhos  92     08 Apr 2019 14:05    ( 04-08 @ 14:05 )   PT: 37.1 sec;   INR: 3.11 ratio  aPTT: 37.6 sec      Cardiac Cath:    < from: Cardiac Cath Lab - Adult (01.09.17 @ 16:14) >    VENTRICLES: No left ventriculogram was performed.  CORONARY VESSELS: The coronary circulation is right dominant.  LM:   --  LM: Normal.  LAD:   --  Proximal LAD: There was a diffuse 10 % stenosis at the site of a  prior angioplasty with stent placement.  --  Mid LAD: There was a diffuse 10 % stenosis at the site of a prior  angioplasty with stent placement. There was DIANA grade 3 flow through the  vessel (brisk flow).  CX:   --  Proximal circumflex: There was a diffuse 10 % stenosis. There was  DIANA grade 3 flow through the vessel (brisk flow).  RCA:   --  Proximal RCA: There was a diffuse 10 % stenosis at the site of a  prior angioplasty with stent placement.  --  Mid RCA: There was a diffuse 10 % stenosis at the site of a prior  angioplasty with stent placement.  COMPLICATIONS: There were no complications.  DIAGNOSTIC RECOMMENDATIONS: Medical management is recommended.    Echo:   TTE with Doppler (w/Cont) (11.16.18 @ 09:47    Fractional short: 16 %  EF (Corbin Rule): 23 %    Conclusions:  1. Tethered mitral valve leaflets with normal opening.  Severe mitral regurgitation.  2. Left ventricular enlargement.  3. Severe segmental left ventricular systolic dysfunction.  The apex is dyskinetic.  The mid to apical septum and  lateral wall are severely hypokinetic.  The inferior and  inferolateral walls are akinetic.   Endocardial  visualization enhanced with intravenous injection of  Ultrasonic Enhancing Agent (Definity). Left ventricular  thrombus not visualized.  4. Severe diastolic dysfunction (Stage III).  5. Moderate right atrial enlargement.  6. Right ventricular enlargement with normal right  ventricular systolic function.   A device wire is noted in  the right heart.  7. Estimated pulmonary artery systolic pressure equals 58  mm Hg, assuming right atrial pressure equals 8 mm Hg,  consistent with moderate pulmonary pressures.      ASSESSMENT & PLAN:    64M w/ hx CHF  EF 20%, AICD [employer]  2014 , HTN, HLD, GERD, Rheumatoid arthritis, PAD, CAD/  MI w/ 9  stents, anterior STEMI c/b cardiogenic shock req restent LAD (in-stent thrombosis) and Impella (9/11/2011), hx of LV thrombus (On Coumadin) , 4/15 pt had NSTEMI with RODRIGO to pCX with Impella support admitted for STEMI w/ cath showing 80%osteal LAD, 100% dRCA, 50% prox circ, LVEDP 48 course c/b hypertensive emergency leading to flash pulmonary edema, now in acute on chronic decompensated HF.    Neuro:  AAO x 4      CV:  new onset STEMI with coronary angiogram that showed 80%osteal LAD, 100% dRCA, 50% prox circ, LVEDP 48  s/p asa and brillinta load, c/w asa 81mg and brilinta 90 mg BID  c/w atorvastatin, recheck hga1c, lipid profile  - start heparin gtt after radial band is removed and INR < 2  - cont to trend cardiac enzymes until peaked    acute on chronic decompensated HF secondary ischemic cardiomyopathy  - c/w bumex 2mg/hr gtt, last known EF 23% in 11/2018 with severe segmental LV dysfunction, elevated pro-BNP  - s/p lasix 80 IVP x 1 and bumex 2mg IVP x1  - start captopril 6.25 BID, hold beta blocker i/s/o ADHF  - repeat TTE  - strict I&O, daily standing weights  - c/w bipap PRN for respiratory distress    hx of LV thrombus  - prior echo was negative for LV thrombus, pt mentioned has "remanent" LV thrombus  - currently inr supratherapeutic, hold coumadin      Pulm:  Acute respiratory distress in setting of pulmonary vascular congestion  - c/w BIPAP to reduce preload, give bipap break anisa for AM meds  ABG consistent with respiratory and metabolic acidosis  -cont to tend q6h    GI:  NPO while on BIPAP    Endo:  last A1c 6.3 in 2016  recheck a1c, tsh    Renal:  UOP improved after bumex,     DVT ppx:  hold i/s/o supratherapeutic INR    Ariadne Reddy PGY-2  Pager # [phone]/ [phone] CCU Accept Note    Transfer from:   ( X  )[facility]    Accepting physican: Dr. Love Moreno    HPI: 64M w/ hx CHF  EF 20%, AICD [employer]  2014 , HTN, HLD, GERD, Rheumatoid arthritis, PAD, CAD/  MI w/ 9  stents, anterior STEMI c/b cardiogenic shock req restent LAD (in-stent thrombosis) and Impella (9/11/2011), hx of LV thrombus (On Coumadin) , 4/15 pt had NSTEMI with RODRIGO to pCX with Impella support.  Pt came to ER today with chest pain.  Pt was standing waiting to go to his EP doctors appointment when the pain started at 9AM .  Was associated with some SOB and inability to breathe in.  Symptoms have persisted til; now although they are waning in severity. Patient was taken for coronary angiogram  by Dr. Ferro. Cath via Right radial artery showed 80% occlusion to the osteal LAD, 100% occlusion of distal RCA and 50% occlusion, EDP 48. Lasix 40mg IV x 1 with plans to start a gtt and transferred to [facility].    Tonight an RRT was called for respiratory distress. Pt hypertensive, likely secondary to flash pulmonary edema.  Diffuse crackles. Bipap ordered. Patient given additional 80mg IV Lasix x 1, gtt started. Pillai placed.  BP initially in 150s, rapidly increasing to the 180s.  Nitro gtt started for BP control per Dr. Bravo. Patient with slight decrease in respiratory distress. Patient alert and oriented throughout. Plan to continue bipap and diuresis and transfer to CCU.    While waiting for CCU bed, patient became hypotensive to the 70s.  Briefly held NTG without an increase in SBP.  Lasix held until SBP in 80s, then restarted.  SBP slowly uptrending to 120 and restarted NTG. BIPAP settings optimized, achieving VT > 500. however, no improvement noted in respiratory effort. Patient becoming increasingly diaphoretic, remaining tachypnea to the 40s.  Multiple attempts to get ABG unsuccessful (one wrist with radial band still in place).  Dr. Bravo at bedside to re-eval patient.  Bumex 2mg IV x 1 given.  Would like to continue BIPAP for now and continue to avoid intubation at this time.  CXR done showing diffuse pulmonary edema. Pillai changed in case of possible obstruction, though flushing easily.    Post Bumex, patient appears to be diuresing better.  Resp rate dropping to 30s.  No longer diaphoretic.  Noted to have a decrease in accessory muscle use.     On my interview patient is in mild respiratory distress. c/o 4/10 dull non radiating left chest pain, pt notes chest pain has improved dramatically from AM. Notes SOB improved on bipap and bumex. Denied other ROS.    SUBJECTIVE DATA:    Home Medications:  carvedilol 6.25 mg oral tablet: 1 tab(s) orally every 12 hours (08 Apr 2019 18:16)  clopidogrel 75 mg oral tablet: 1 tab(s) orally once a day (in the morning) (08 Apr 2019 18:16)  digoxin 125 mcg (0.125 mg) oral tablet: 1 tab(s) orally once a day (08 Apr 2019 18:16)  folic acid 1 mg oral tablet: 1 tab(s) orally once a day (08 Apr 2019 18:16)  furosemide 40 mg oral tablet: 1 tab(s) orally once a day (in the evening) (08 Apr 2019 18:16)  isosorbide mononitrate 120 mg oral tablet, extended release: 1 tab(s) orally 2 times a day (08 Apr 2019 18:16)  lisinopril 5 mg oral tablet: 1 tab(s) orally once a day (in the morning) (08 Apr 2019 18:16)  methotrexate 2.5 mg oral tablet: 8 tab(s) orally once a week  MONDAYS  (08 Apr 2019 18:16)  rosuvastatin 40 mg oral tablet: 1 tab(s) orally once a day (at bedtime) (08 Apr 2019 18:16)  spironolactone 25 mg oral tablet: 1 tab(s) orally once a day (in the morning) (08 Apr 2019 18:16)  warfarin 5 mg oral tablet: 1 tab(s) orally once a day (at bedtime) (08 Apr 2019 18:16)      SOCIAL HX:  denied toxic habits    Allergies:  Penicillin     OBJECTIVE DATA:    Vital Signs Last 24 Hrs  T(C): 36.5 (08 Apr 2019 21:02), Max: 36.7 (08 Apr 2019 18:10)  T(F): 97.7 (08 Apr 2019 21:02), Max: 98 (08 Apr 2019 18:10)  HR: 112 (08 Apr 2019 21:02) (72 - 112)  BP: 92/75 (08 Apr 2019 21:02) (92/75 - 108/80)  BP(mean): 94 (08 Apr 2019 18:10) (94 - 94)  RR: 24 (08 Apr 2019 21:02) (15 - 24)  SpO2: 97% (08 Apr 2019 21:02) (95% - 97%)  I&O's Summary    General: mild respiratory distress on bipap  Neurology: A&Ox3, nonfocal, CROWELL x 4  Eyes: PERRLA/ EOMI, Gross vision intact  ENT/Neck: Neck supple, trachea midline, No JVD, Gross hearing intact  Respiratory: bibasilar crackles  CV: RRR, S1S2, no murmurs, rubs or gallops  Abdominal: Soft, NT, ND +BS,   Extremities: No edema, + peripheral pulses  Skin: No Rashes, Hematoma, Ecchymosis      MEDICATIONS  (STANDING):  buMETAnide Injectable 2 milliGRAM(s) IV Push once  furosemide Infusion 20 mG/Hr (10 mL/Hr) IV Continuous <Continuous>  nitroglycerin  Infusion 10 MICROgram(s)/Min (3 mL/Hr) IV Continuous <Continuous>    MEDICATIONS  (PRN):      LABS                                            14.6                  Neurophils% (auto):   x      (04-08 @ 14:05):    10.1 )-----------(250          Lymphocytes% (auto):  x                                             43.0                   Eosinphils% (auto):   x        Manual%: Neutrophils x    ; Lymphocytes x    ; Eosinophils x    ; Bands%: x    ; Blasts x                                    132    |  97     |  12                  Calcium: 9.3   / iCa: x      (04-08 @ 14:05)    ----------------------------<  120       Magnesium: 2.1                              4.4     |  21     |  0.69             Phosphorous: x        TPro  6.9    /  Alb  3.7    /  TBili  1.3    /  DBili  x      /  AST  106    /  ALT  25     /  AlkPhos  92     08 Apr 2019 14:05    ( 04-08 @ 14:05 )   PT: 37.1 sec;   INR: 3.11 ratio  aPTT: 37.6 sec      Cardiac Cath:    < from: Cardiac Cath Lab - Adult (01.09.17 @ 16:14) >    VENTRICLES: No left ventriculogram was performed.  CORONARY VESSELS: The coronary circulation is right dominant.  LM:   --  LM: Normal.  LAD:   --  Proximal LAD: There was a diffuse 10 % stenosis at the site of a  prior angioplasty with stent placement.  --  Mid LAD: There was a diffuse 10 % stenosis at the site of a prior  angioplasty with stent placement. There was DIANA grade 3 flow through the  vessel (brisk flow).  CX:   --  Proximal circumflex: There was a diffuse 10 % stenosis. There was  DIANA grade 3 flow through the vessel (brisk flow).  RCA:   --  Proximal RCA: There was a diffuse 10 % stenosis at the site of a  prior angioplasty with stent placement.  --  Mid RCA: There was a diffuse 10 % stenosis at the site of a prior  angioplasty with stent placement.  COMPLICATIONS: There were no complications.  DIAGNOSTIC RECOMMENDATIONS: Medical management is recommended.    Echo:   TTE with Doppler (w/Cont) (11.16.18 @ 09:47    Fractional short: 16 %  EF (Corbin Rule): 23 %    Conclusions:  1. Tethered mitral valve leaflets with normal opening.  Severe mitral regurgitation.  2. Left ventricular enlargement.  3. Severe segmental left ventricular systolic dysfunction.  The apex is dyskinetic.  The mid to apical septum and  lateral wall are severely hypokinetic.  The inferior and  inferolateral walls are akinetic.   Endocardial  visualization enhanced with intravenous injection of  Ultrasonic Enhancing Agent (Definity). Left ventricular  thrombus not visualized.  4. Severe diastolic dysfunction (Stage III).  5. Moderate right atrial enlargement.  6. Right ventricular enlargement with normal right  ventricular systolic function.   A device wire is noted in  the right heart.  7. Estimated pulmonary artery systolic pressure equals 58  mm Hg, assuming right atrial pressure equals 8 mm Hg,  consistent with moderate pulmonary pressures.      ASSESSMENT & PLAN:    64M w/ hx CHF  EF 20%, AICD [employer]  2014 , HTN, HLD, GERD, Rheumatoid arthritis, PAD, CAD/  MI w/ 9  stents, anterior STEMI c/b cardiogenic shock req restent LAD (in-stent thrombosis) and Impella (9/11/2011), hx of LV thrombus (On Coumadin) , 4/15 pt had NSTEMI with RODRIGO to pCX with Impella support admitted for STEMI w/ cath showing 80%osteal LAD, 100% dRCA, 50% prox circ, LVEDP 48 course c/b hypertensive emergency leading to flash pulmonary edema, now in acute on chronic decompensated HF.    Neuro:  AAO x 4      CV:  new onset STEMI with coronary angiogram that showed 80%osteal LAD, 100% dRCA, 50% prox circ, LVEDP 48  s/p asa and brillinta load, c/w asa 81mg and brilinta 90 mg BID  c/w atorvastatin, recheck hga1c, lipid profile  - start heparin gtt after radial band is removed and INR < 2  - cont to trend cardiac enzymes until peaked    acute on chronic decompensated HF secondary ischemic cardiomyopathy  - c/w bumex 2mg/hr gtt, last known EF 23% in 11/2018 with severe segmental LV dysfunction, elevated pro-BNP  - s/p lasix 80 IVP x 1 and bumex 2mg IVP x1  - start captopril 6.25 BID, hold beta blocker i/s/o ADHF  - repeat TTE  - strict I&O, daily standing weights  - c/w bipap PRN for respiratory distress    hx of LV thrombus  - prior echo was negative for LV thrombus, pt mentioned has "remanent" LV thrombus  - currently inr supratherapeutic, hold coumadin      Pulm:  Acute respiratory distress in setting of pulmonary vascular congestion  - c/w BIPAP to reduce preload, give bipap break anisa for AM meds  ABG consistent with respiratory and metabolic acidosis  -cont to tend q6h    GI:  NPO while on BIPAP    Endo:  last A1c 6.3 in 2016  recheck a1c, tsh    Renal:  UOP improved after bumex,     DVT ppx:  hold i/s/o supratherapeutic INR    Ariadne Reddy PGY-2  Pager # [phone]/ [phone] CCU Accept Note    Transfer from:   ( X  )[facility]    Accepting physican: Dr. Love Moreno    HPI: 64M w/ hx CHF  EF 20%, AICD [employer]  2014 , HTN, HLD, GERD, Rheumatoid arthritis, PAD, CAD/  MI w/ 9  stents, anterior STEMI c/b cardiogenic shock req restent LAD (in-stent thrombosis) and Impella (9/11/2011), hx of LV thrombus (On Coumadin) , 4/15 pt had NSTEMI with RODRIGO to pCX with Impella support.  Pt came to ER today with chest pain.  Pt was standing waiting to go to his EP doctors appointment when the pain started at 9AM .  Was associated with some SOB and inability to breathe in.  Symptoms have persisted til; now although they are waning in severity. EKG was negative for ST changes or T wave inversion, troponin elevated. Patient was taken for coronary angiogram  by Dr. Ferro. Cath via Right radial artery showed 80% occlusion to the osteal LAD, 100% occlusion of distal RCA and 50% occlusion, EDP 48. Lasix 40mg IV x 1 with plans to start a gtt and transferred to [facility].    Tonight an RRT was called for respiratory distress. Pt hypertensive, likely secondary to flash pulmonary edema.  Diffuse crackles. Bipap ordered. Patient given additional 80mg IV Lasix x 1, gtt started. Pillai placed.  BP initially in 150s, rapidly increasing to the 180s.  Nitro gtt started for BP control per Dr. Bravo. Patient with slight decrease in respiratory distress. Patient alert and oriented throughout. Plan to continue bipap and diuresis and transfer to CCU.    While waiting for CCU bed, patient became hypotensive to the 70s.  Briefly held NTG without an increase in SBP.  Lasix held until SBP in 80s, then restarted.  SBP slowly uptrending to 120 and restarted NTG. BIPAP settings optimized, achieving VT > 500. however, no improvement noted in respiratory effort. Patient becoming increasingly diaphoretic, remaining tachypnea to the 40s.  Multiple attempts to get ABG unsuccessful (one wrist with radial band still in place).  Dr. Bravo at bedside to re-eval patient.  Bumex 2mg IV x 1 given.  Would like to continue BIPAP for now and continue to avoid intubation at this time.  CXR done showing diffuse pulmonary edema. Pillai changed in case of possible obstruction, though flushing easily.    Post Bumex, patient appears to be diuresing better.  Resp rate dropping to 30s.  No longer diaphoretic.  Noted to have a decrease in accessory muscle use.     On my interview patient is in mild respiratory distress. c/o 4/10 dull non radiating left chest pain, pt notes chest pain has improved dramatically from AM. Notes SOB improved on bipap and bumex. Denied other ROS.    SUBJECTIVE DATA:    Home Medications:  carvedilol 6.25 mg oral tablet: 1 tab(s) orally every 12 hours (08 Apr 2019 18:16)  clopidogrel 75 mg oral tablet: 1 tab(s) orally once a day (in the morning) (08 Apr 2019 18:16)  digoxin 125 mcg (0.125 mg) oral tablet: 1 tab(s) orally once a day (08 Apr 2019 18:16)  folic acid 1 mg oral tablet: 1 tab(s) orally once a day (08 Apr 2019 18:16)  furosemide 40 mg oral tablet: 1 tab(s) orally once a day (in the evening) (08 Apr 2019 18:16)  isosorbide mononitrate 120 mg oral tablet, extended release: 1 tab(s) orally 2 times a day (08 Apr 2019 18:16)  lisinopril 5 mg oral tablet: 1 tab(s) orally once a day (in the morning) (08 Apr 2019 18:16)  methotrexate 2.5 mg oral tablet: 8 tab(s) orally once a week  MONDAYS  (08 Apr 2019 18:16)  rosuvastatin 40 mg oral tablet: 1 tab(s) orally once a day (at bedtime) (08 Apr 2019 18:16)  spironolactone 25 mg oral tablet: 1 tab(s) orally once a day (in the morning) (08 Apr 2019 18:16)  warfarin 5 mg oral tablet: 1 tab(s) orally once a day (at bedtime) (08 Apr 2019 18:16)      SOCIAL HX:  denied toxic habits    Allergies:  Penicillin     OBJECTIVE DATA:    Vital Signs Last 24 Hrs  T(C): 36.5 (08 Apr 2019 21:02), Max: 36.7 (08 Apr 2019 18:10)  T(F): 97.7 (08 Apr 2019 21:02), Max: 98 (08 Apr 2019 18:10)  HR: 112 (08 Apr 2019 21:02) (72 - 112)  BP: 92/75 (08 Apr 2019 21:02) (92/75 - 108/80)  BP(mean): 94 (08 Apr 2019 18:10) (94 - 94)  RR: 24 (08 Apr 2019 21:02) (15 - 24)  SpO2: 97% (08 Apr 2019 21:02) (95% - 97%)  I&O's Summary    General: mild respiratory distress on bipap  Neurology: A&Ox3, nonfocal, CROWELL x 4  Eyes: PERRLA/ EOMI, Gross vision intact  ENT/Neck: Neck supple, trachea midline, No JVD, Gross hearing intact  Respiratory: bibasilar crackles  CV: RRR, S1S2, no murmurs, rubs or gallops  Abdominal: Soft, NT, ND +BS,   Extremities: No edema, + peripheral pulses  Skin: No Rashes, Hematoma, Ecchymosis      MEDICATIONS  (STANDING):  buMETAnide Injectable 2 milliGRAM(s) IV Push once  furosemide Infusion 20 mG/Hr (10 mL/Hr) IV Continuous <Continuous>  nitroglycerin  Infusion 10 MICROgram(s)/Min (3 mL/Hr) IV Continuous <Continuous>    MEDICATIONS  (PRN):      LABS                                            14.6                  Neurophils% (auto):   x      (04-08 @ 14:05):    10.1 )-----------(250          Lymphocytes% (auto):  x                                             43.0                   Eosinphils% (auto):   x        Manual%: Neutrophils x    ; Lymphocytes x    ; Eosinophils x    ; Bands%: x    ; Blasts x                                    132    |  97     |  12                  Calcium: 9.3   / iCa: x      (04-08 @ 14:05)    ----------------------------<  120       Magnesium: 2.1                              4.4     |  21     |  0.69             Phosphorous: x        TPro  6.9    /  Alb  3.7    /  TBili  1.3    /  DBili  x      /  AST  106    /  ALT  25     /  AlkPhos  92     08 Apr 2019 14:05    ( 04-08 @ 14:05 )   PT: 37.1 sec;   INR: 3.11 ratio  aPTT: 37.6 sec      Cardiac Cath:    < from: Cardiac Cath Lab - Adult (01.09.17 @ 16:14) >    VENTRICLES: No left ventriculogram was performed.  CORONARY VESSELS: The coronary circulation is right dominant.  LM:   --  LM: Normal.  LAD:   --  Proximal LAD: There was a diffuse 10 % stenosis at the site of a  prior angioplasty with stent placement.  --  Mid LAD: There was a diffuse 10 % stenosis at the site of a prior  angioplasty with stent placement. There was DIANA grade 3 flow through the  vessel (brisk flow).  CX:   --  Proximal circumflex: There was a diffuse 10 % stenosis. There was  DIANA grade 3 flow through the vessel (brisk flow).  RCA:   --  Proximal RCA: There was a diffuse 10 % stenosis at the site of a  prior angioplasty with stent placement.  --  Mid RCA: There was a diffuse 10 % stenosis at the site of a prior  angioplasty with stent placement.  COMPLICATIONS: There were no complications.  DIAGNOSTIC RECOMMENDATIONS: Medical management is recommended.    Echo:   TTE with Doppler (w/Cont) (11.16.18 @ 09:47    Fractional short: 16 %  EF (Corbin Rule): 23 %    Conclusions:  1. Tethered mitral valve leaflets with normal opening.  Severe mitral regurgitation.  2. Left ventricular enlargement.  3. Severe segmental left ventricular systolic dysfunction.  The apex is dyskinetic.  The mid to apical septum and  lateral wall are severely hypokinetic.  The inferior and  inferolateral walls are akinetic.   Endocardial  visualization enhanced with intravenous injection of  Ultrasonic Enhancing Agent (Definity). Left ventricular  thrombus not visualized.  4. Severe diastolic dysfunction (Stage III).  5. Moderate right atrial enlargement.  6. Right ventricular enlargement with normal right  ventricular systolic function.   A device wire is noted in  the right heart.  7. Estimated pulmonary artery systolic pressure equals 58  mm Hg, assuming right atrial pressure equals 8 mm Hg,  consistent with moderate pulmonary pressures.      ASSESSMENT & PLAN:    64M w/ hx CHF  EF 20%, AICD [employer]  2014 , HTN, HLD, GERD, Rheumatoid arthritis, PAD, CAD/  MI w/ 9  stents, anterior STEMI c/b cardiogenic shock req restent LAD (in-stent thrombosis) and Impella (9/11/2011), hx of LV thrombus (On Coumadin) , 4/15 pt had NSTEMI with RODRIGO to pCX with Impella support admitted for NSTEMI w/ cath showing 80%osteal LAD, 100% dRCA, 50% prox circ, LVEDP 48 course c/b hypertensive emergency leading to flash pulmonary edema, now in acute on chronic decompensated HF.    Neuro:  AAO x 4      CV:  NSTEMI with coronary angiogram that showed 80%osteal LAD, 100% dRCA, 50% prox circ, LVEDP 48  s/p asa and brillinta load, c/w asa 81mg and brilinta 90 mg BID  c/w atorvastatin, recheck hga1c, lipid profile  - start heparin gtt after radial band is removed and INR < 2  - cont to trend cardiac enzymes until peaked  - pending final recommendation from interventional cardiologist regarding management of the lesions : CT surgery eval vs medical optimization    acute on chronic decompensated HF secondary ischemic cardiomyopathy  - c/w bumex 2mg/hr gtt, last known EF 23% in 11/2018 with severe segmental LV dysfunction, elevated pro-BNP  - s/p lasix 80 IVP x 1 and bumex 2mg IVP x1  - start captopril 6.25 BID, hold beta blocker i/s/o ADHF  - repeat TTE  - strict I&O, daily standing weights  - c/w bipap PRN for respiratory distress    hx of LV thrombus  - prior echo was negative for LV thrombus, pt mentioned has "remanent" LV thrombus  - currently inr supratherapeutic, hold coumadin      Pulm:  Acute respiratory distress in setting of pulmonary vascular congestion  - c/w BIPAP to reduce preload, give bipap break anisa for AM meds  ABG consistent with respiratory and metabolic acidosis  -cont to tend q6h    GI:  NPO while on BIPAP    Endo:  last A1c 6.3 in 2016  recheck a1c, tsh    Renal:  UOP improved after bumex,     DVT ppx:  hold i/s/o supratherapeutic INR    Ariadne Reddy PGY-2  Pager # [phone]/ [phone] CCU Accept Note    Transfer from:   ( X  )[facility]    Accepting physican: Dr. Love Moreno    HPI: 64M w/ hx CHF  EF 20%, AICD [employer]  2014 , HTN, HLD, GERD, Rheumatoid arthritis, PAD, CAD/  MI w/ 9  stents, anterior STEMI c/b cardiogenic shock req restent LAD (in-stent thrombosis) and Impella (9/11/2011), hx of LV thrombus (On Coumadin) , 4/15 pt had NSTEMI with RODRIGO to pCX with Impella support.  Pt came to ER today with chest pain.  Pt was standing waiting to go to his EP doctors appointment when the pain started at 9AM .  Was associated with some SOB and inability to breathe in.  Symptoms have persisted til; now although they are waning in severity. EKG was negative for ST changes or T wave inversion, troponin elevated. Patient was taken for coronary angiogram  by Dr. Ferro. Cath via Right radial artery showed 80% occlusion to the osteal LAD, 100% occlusion of distal RCA and 50% occlusion, EDP 48. Lasix 40mg IV x 1 with plans to start a gtt and transferred to [facility].    Tonight an RRT was called for respiratory distress. Pt hypertensive, likely secondary to flash pulmonary edema.  Diffuse crackles. Bipap ordered. Patient given additional 80mg IV Lasix x 1, gtt started. Pillai placed.  BP initially in 150s, rapidly increasing to the 180s.  Nitro gtt started for BP control per Dr. Bravo. Patient with slight decrease in respiratory distress. Patient alert and oriented throughout. Plan to continue bipap and diuresis and transfer to CCU.    While waiting for CCU bed, patient became hypotensive to the 70s.  Briefly held NTG without an increase in SBP.  Lasix held until SBP in 80s, then restarted.  SBP slowly uptrending to 120 and restarted NTG. BIPAP settings optimized, achieving VT > 500. however, no improvement noted in respiratory effort. Patient becoming increasingly diaphoretic, remaining tachypnea to the 40s.  Multiple attempts to get ABG unsuccessful (one wrist with radial band still in place).  Dr. Bravo at bedside to re-eval patient.  Bumex 2mg IV x 1 given.  Would like to continue BIPAP for now and continue to avoid intubation at this time.  CXR done showing diffuse pulmonary edema. Pillai changed in case of possible obstruction, though flushing easily.    Post Bumex, patient appears to be diuresing better.  Resp rate dropping to 30s.  No longer diaphoretic.  Noted to have a decrease in accessory muscle use.     On my interview patient is in mild respiratory distress. c/o 4/10 dull non radiating left chest pain, pt notes chest pain has improved dramatically from AM. Notes SOB improved on bipap and bumex. Denied other ROS.    SUBJECTIVE DATA:    Home Medications:  carvedilol 6.25 mg oral tablet: 1 tab(s) orally every 12 hours (08 Apr 2019 18:16)  clopidogrel 75 mg oral tablet: 1 tab(s) orally once a day (in the morning) (08 Apr 2019 18:16)  digoxin 125 mcg (0.125 mg) oral tablet: 1 tab(s) orally once a day (08 Apr 2019 18:16)  folic acid 1 mg oral tablet: 1 tab(s) orally once a day (08 Apr 2019 18:16)  furosemide 40 mg oral tablet: 1 tab(s) orally once a day (in the evening) (08 Apr 2019 18:16)  isosorbide mononitrate 120 mg oral tablet, extended release: 1 tab(s) orally 2 times a day (08 Apr 2019 18:16)  lisinopril 5 mg oral tablet: 1 tab(s) orally once a day (in the morning) (08 Apr 2019 18:16)  methotrexate 2.5 mg oral tablet: 8 tab(s) orally once a week  MONDAYS  (08 Apr 2019 18:16)  rosuvastatin 40 mg oral tablet: 1 tab(s) orally once a day (at bedtime) (08 Apr 2019 18:16)  spironolactone 25 mg oral tablet: 1 tab(s) orally once a day (in the morning) (08 Apr 2019 18:16)  warfarin 5 mg oral tablet: 1 tab(s) orally once a day (at bedtime) (08 Apr 2019 18:16)      SOCIAL HX:  denied toxic habits    Allergies:  Penicillin     OBJECTIVE DATA:    Vital Signs Last 24 Hrs  T(C): 36.5 (08 Apr 2019 21:02), Max: 36.7 (08 Apr 2019 18:10)  T(F): 97.7 (08 Apr 2019 21:02), Max: 98 (08 Apr 2019 18:10)  HR: 112 (08 Apr 2019 21:02) (72 - 112)  BP: 92/75 (08 Apr 2019 21:02) (92/75 - 108/80)  BP(mean): 94 (08 Apr 2019 18:10) (94 - 94)  RR: 24 (08 Apr 2019 21:02) (15 - 24)  SpO2: 97% (08 Apr 2019 21:02) (95% - 97%)  I&O's Summary    General: mild respiratory distress on bipap  Neurology: A&Ox3, nonfocal, CROWELL x 4  Eyes: PERRLA/ EOMI, Gross vision intact  ENT/Neck: Neck supple, trachea midline, No JVD, Gross hearing intact  Respiratory: bibasilar crackles  CV: RRR, S1S2, no murmurs, rubs or gallops  Abdominal: Soft, NT, ND +BS,   Extremities: No edema, + peripheral pulses  Skin: No Rashes, Hematoma, Ecchymosis      MEDICATIONS  (STANDING):  buMETAnide Injectable 2 milliGRAM(s) IV Push once  furosemide Infusion 20 mG/Hr (10 mL/Hr) IV Continuous <Continuous>  nitroglycerin  Infusion 10 MICROgram(s)/Min (3 mL/Hr) IV Continuous <Continuous>    MEDICATIONS  (PRN):      LABS                                            14.6                  Neurophils% (auto):   x      (04-08 @ 14:05):    10.1 )-----------(250          Lymphocytes% (auto):  x                                             43.0                   Eosinphils% (auto):   x        Manual%: Neutrophils x    ; Lymphocytes x    ; Eosinophils x    ; Bands%: x    ; Blasts x                                    132    |  97     |  12                  Calcium: 9.3   / iCa: x      (04-08 @ 14:05)    ----------------------------<  120       Magnesium: 2.1                              4.4     |  21     |  0.69             Phosphorous: x        TPro  6.9    /  Alb  3.7    /  TBili  1.3    /  DBili  x      /  AST  106    /  ALT  25     /  AlkPhos  92     08 Apr 2019 14:05    ( 04-08 @ 14:05 )   PT: 37.1 sec;   INR: 3.11 ratio  aPTT: 37.6 sec      Cardiac Cath:    < from: Cardiac Cath Lab - Adult (01.09.17 @ 16:14) >    VENTRICLES: No left ventriculogram was performed.  CORONARY VESSELS: The coronary circulation is right dominant.  LM:   --  LM: Normal.  LAD:   --  Proximal LAD: There was a diffuse 10 % stenosis at the site of a  prior angioplasty with stent placement.  --  Mid LAD: There was a diffuse 10 % stenosis at the site of a prior  angioplasty with stent placement. There was DIANA grade 3 flow through the  vessel (brisk flow).  CX:   --  Proximal circumflex: There was a diffuse 10 % stenosis. There was  DIANA grade 3 flow through the vessel (brisk flow).  RCA:   --  Proximal RCA: There was a diffuse 10 % stenosis at the site of a  prior angioplasty with stent placement.  --  Mid RCA: There was a diffuse 10 % stenosis at the site of a prior  angioplasty with stent placement.  COMPLICATIONS: There were no complications.  DIAGNOSTIC RECOMMENDATIONS: Medical management is recommended.    Echo:   TTE with Doppler (w/Cont) (11.16.18 @ 09:47    Fractional short: 16 %  EF (Corbin Rule): 23 %    Conclusions:  1. Tethered mitral valve leaflets with normal opening.  Severe mitral regurgitation.  2. Left ventricular enlargement.  3. Severe segmental left ventricular systolic dysfunction.  The apex is dyskinetic.  The mid to apical septum and  lateral wall are severely hypokinetic.  The inferior and  inferolateral walls are akinetic.   Endocardial  visualization enhanced with intravenous injection of  Ultrasonic Enhancing Agent (Definity). Left ventricular  thrombus not visualized.  4. Severe diastolic dysfunction (Stage III).  5. Moderate right atrial enlargement.  6. Right ventricular enlargement with normal right  ventricular systolic function.   A device wire is noted in  the right heart.  7. Estimated pulmonary artery systolic pressure equals 58  mm Hg, assuming right atrial pressure equals 8 mm Hg,  consistent with moderate pulmonary pressures.      ASSESSMENT & PLAN:    64M w/ hx CHF  EF 20%, AICD [employer]  2014 , HTN, HLD, GERD, Rheumatoid arthritis, PAD, CAD/  MI w/ 9  stents, anterior STEMI c/b cardiogenic shock req restent LAD (in-stent thrombosis) and Impella (9/11/2011), hx of LV thrombus (On Coumadin) , 4/15 pt had NSTEMI with RODRIGO to pCX with Impella support admitted for NSTEMI w/ cath showing 80%osteal LAD, 100% dRCA, 50% prox circ, LVEDP 48 course c/b hypertensive emergency leading to flash pulmonary edema, now in acute on chronic decompensated HF.    Neuro:  AAO x 4      CV:  NSTEMI with coronary angiogram that showed 80%osteal LAD, 100% dRCA, 50% prox circ, LVEDP 48  s/p asa and brillinta load, c/w asa 81mg and brilinta 90 mg BID  c/w atorvastatin, recheck hga1c, lipid profile  - start heparin gtt after radial band is removed and INR < 2  - cont to trend cardiac enzymes until peaked  - pending final recommendation from interventional cardiologist regarding management of the lesions : CT surgery eval vs medical optimization    acute on chronic decompensated HF secondary ischemic cardiomyopathy  - c/w bumex 2mg/hr gtt, last known EF 23% in 11/2018 with severe segmental LV dysfunction, elevated pro-BNP  - s/p lasix 80 IVP x 1 and bumex 2mg IVP x1  - start captopril 6.25 BID, hold beta blocker i/s/o ADHF  - repeat TTE  - strict I&O, daily standing weights  - c/w bipap PRN for respiratory distress    hx of LV thrombus  - prior echo was negative for LV thrombus, pt mentioned has "remanent" LV thrombus  - currently inr supratherapeutic, hold coumadin      Pulm:  Acute respiratory distress in setting of pulmonary vascular congestion  - c/w BIPAP to reduce preload, give bipap break anisa for AM meds  ABG consistent with respiratory and metabolic acidosis  -cont to tend q6h    GI:  NPO while on BIPAP    Endo:  last A1c 6.3 in 2016  recheck a1c, tsh    Renal:  UOP improved after bumex,     DVT ppx:  hold i/s/o supratherapeutic INR    Ariadne Reddy PGY-2  Pager # [phone]/ [phone]      CCU Fellow Addendum    63 yo male w h/o ischemic cardiomyopathy (EF 23%) s/p ICD, LV thrombus (Coumadin), CAD s/p 9 stents, HTN, HLD, PAD, RA p/w chest pain, admitted with acute decompensated HFrEF, NSTEMI.    - Flash pulmonary edema after diagnostic cath. Treated with BIPAP, nitro gtt, lasix 120 IV and then gtt. Urine output improved after Bumex 2 IV. Pt is clinically improved at this time, will wean BIPAP as tolerated.  - Switch from Lasix gtt to Bumex gtt  - Afterload reduction with Captopril, uptitrate as tolerated  - Wean nitro gtt  - Medical management of NSTEMI with heparin gtt, DAPT, Lipitor  - Repeat TTE  - Will discuss possible role for revascularization with interventionalist    Sriram Bravo MD  Cardiology Fellow  [id number] CCU Accept Note    Transfer from:   ( X  )[facility]    Accepting physican: Dr. Love Wallace    HPI: 64M w/ hx CHF  EF 20%, AICD [employer]  2014 , HTN, HLD, GERD, Rheumatoid arthritis, PAD, CAD/  MI w/ 9  stents, anterior STEMI c/b cardiogenic shock req restent LAD (in-stent thrombosis) and Impella (9/11/2011), hx of LV thrombus (On Coumadin) , 4/15 pt had NSTEMI with RODRIGO to pCX with Impella support.  Pt came to ER today with chest pain.  Pt was standing waiting to go to his EP doctors appointment when the pain started at 9AM .  Was associated with some SOB and inability to breathe in.  Symptoms have persisted til; now although they are waning in severity. EKG was negative for ST changes or T wave inversion, troponin elevated. Patient was taken for coronary angiogram  by Dr. Ferro. Cath via Right radial artery showed 80% occlusion to the osteal LAD, 100% occlusion of distal RCA and 50% occlusion, EDP 48. Lasix 40mg IV x 1 with plans to start a gtt and transferred to [facility].    Tonight an RRT was called for respiratory distress. Pt hypertensive, likely secondary to flash pulmonary edema.  Diffuse crackles. Bipap ordered. Patient given additional 80mg IV Lasix x 1, gtt started. Pillai placed.  BP initially in 150s, rapidly increasing to the 180s.  Nitro gtt started for BP control per Dr. Bravo. Patient with slight decrease in respiratory distress. Patient alert and oriented throughout. Plan to continue bipap and diuresis and transfer to CCU.    While waiting for CCU bed, patient became hypotensive to the 70s.  Briefly held NTG without an increase in SBP.  Lasix held until SBP in 80s, then restarted.  SBP slowly uptrending to 120 and restarted NTG. BIPAP settings optimized, achieving VT > 500. however, no improvement noted in respiratory effort. Patient becoming increasingly diaphoretic, remaining tachypnea to the 40s.  Multiple attempts to get ABG unsuccessful (one wrist with radial band still in place).  Dr. Bravo at bedside to re-eval patient.  Bumex 2mg IV x 1 given.  Would like to continue BIPAP for now and continue to avoid intubation at this time.  CXR done showing diffuse pulmonary edema. Pillai changed in case of possible obstruction, though flushing easily.    Post Bumex, patient appears to be diuresing better.  Resp rate dropping to 30s.  No longer diaphoretic.  Noted to have a decrease in accessory muscle use.     On my interview patient is in mild respiratory distress. c/o 4/10 dull non radiating left chest pain, pt notes chest pain has improved dramatically from AM. Notes SOB improved on bipap and bumex. Denied other ROS.    SUBJECTIVE DATA:    Home Medications:  carvedilol 6.25 mg oral tablet: 1 tab(s) orally every 12 hours (08 Apr 2019 18:16)  clopidogrel 75 mg oral tablet: 1 tab(s) orally once a day (in the morning) (08 Apr 2019 18:16)  digoxin 125 mcg (0.125 mg) oral tablet: 1 tab(s) orally once a day (08 Apr 2019 18:16)  folic acid 1 mg oral tablet: 1 tab(s) orally once a day (08 Apr 2019 18:16)  furosemide 40 mg oral tablet: 1 tab(s) orally once a day (in the evening) (08 Apr 2019 18:16)  isosorbide mononitrate 120 mg oral tablet, extended release: 1 tab(s) orally 2 times a day (08 Apr 2019 18:16)  lisinopril 5 mg oral tablet: 1 tab(s) orally once a day (in the morning) (08 Apr 2019 18:16)  methotrexate 2.5 mg oral tablet: 8 tab(s) orally once a week  MONDAYS  (08 Apr 2019 18:16)  rosuvastatin 40 mg oral tablet: 1 tab(s) orally once a day (at bedtime) (08 Apr 2019 18:16)  spironolactone 25 mg oral tablet: 1 tab(s) orally once a day (in the morning) (08 Apr 2019 18:16)  warfarin 5 mg oral tablet: 1 tab(s) orally once a day (at bedtime) (08 Apr 2019 18:16)      SOCIAL HX:  denied toxic habits    Allergies:  Penicillin     OBJECTIVE DATA:    Vital Signs Last 24 Hrs  T(C): 36.5 (08 Apr 2019 21:02), Max: 36.7 (08 Apr 2019 18:10)  T(F): 97.7 (08 Apr 2019 21:02), Max: 98 (08 Apr 2019 18:10)  HR: 112 (08 Apr 2019 21:02) (72 - 112)  BP: 92/75 (08 Apr 2019 21:02) (92/75 - 108/80)  BP(mean): 94 (08 Apr 2019 18:10) (94 - 94)  RR: 24 (08 Apr 2019 21:02) (15 - 24)  SpO2: 97% (08 Apr 2019 21:02) (95% - 97%)  I&O's Summary    General: mild respiratory distress on bipap  Neurology: A&Ox3, nonfocal, CROWELL x 4  Eyes: PERRLA/ EOMI, Gross vision intact  ENT/Neck: Neck supple, trachea midline, No JVD, Gross hearing intact  Respiratory: bibasilar crackles  CV: RRR, S1S2, no murmurs, rubs or gallops  Abdominal: Soft, NT, ND +BS,   Extremities: No edema, + peripheral pulses  Skin: No Rashes, Hematoma, Ecchymosis      MEDICATIONS  (STANDING):  buMETAnide Injectable 2 milliGRAM(s) IV Push once  furosemide Infusion 20 mG/Hr (10 mL/Hr) IV Continuous <Continuous>  nitroglycerin  Infusion 10 MICROgram(s)/Min (3 mL/Hr) IV Continuous <Continuous>    MEDICATIONS  (PRN):      LABS                                            14.6                  Neurophils% (auto):   x      (04-08 @ 14:05):    10.1 )-----------(250          Lymphocytes% (auto):  x                                             43.0                   Eosinphils% (auto):   x        Manual%: Neutrophils x    ; Lymphocytes x    ; Eosinophils x    ; Bands%: x    ; Blasts x                                    132    |  97     |  12                  Calcium: 9.3   / iCa: x      (04-08 @ 14:05)    ----------------------------<  120       Magnesium: 2.1                              4.4     |  21     |  0.69             Phosphorous: x        TPro  6.9    /  Alb  3.7    /  TBili  1.3    /  DBili  x      /  AST  106    /  ALT  25     /  AlkPhos  92     08 Apr 2019 14:05    ( 04-08 @ 14:05 )   PT: 37.1 sec;   INR: 3.11 ratio  aPTT: 37.6 sec      Cardiac Cath:    < from: Cardiac Cath Lab - Adult (01.09.17 @ 16:14) >    VENTRICLES: No left ventriculogram was performed.  CORONARY VESSELS: The coronary circulation is right dominant.  LM:   --  LM: Normal.  LAD:   --  Proximal LAD: There was a diffuse 10 % stenosis at the site of a  prior angioplasty with stent placement.  --  Mid LAD: There was a diffuse 10 % stenosis at the site of a prior  angioplasty with stent placement. There was DIANA grade 3 flow through the  vessel (brisk flow).  CX:   --  Proximal circumflex: There was a diffuse 10 % stenosis. There was  DIANA grade 3 flow through the vessel (brisk flow).  RCA:   --  Proximal RCA: There was a diffuse 10 % stenosis at the site of a  prior angioplasty with stent placement.  --  Mid RCA: There was a diffuse 10 % stenosis at the site of a prior  angioplasty with stent placement.  COMPLICATIONS: There were no complications.  DIAGNOSTIC RECOMMENDATIONS: Medical management is recommended.    Echo:   TTE with Doppler (w/Cont) (11.16.18 @ 09:47    Fractional short: 16 %  EF (Corbin Rule): 23 %    Conclusions:  1. Tethered mitral valve leaflets with normal opening.  Severe mitral regurgitation.  2. Left ventricular enlargement.  3. Severe segmental left ventricular systolic dysfunction.  The apex is dyskinetic.  The mid to apical septum and  lateral wall are severely hypokinetic.  The inferior and  inferolateral walls are akinetic.   Endocardial  visualization enhanced with intravenous injection of  Ultrasonic Enhancing Agent (Definity). Left ventricular  thrombus not visualized.  4. Severe diastolic dysfunction (Stage III).  5. Moderate right atrial enlargement.  6. Right ventricular enlargement with normal right  ventricular systolic function.   A device wire is noted in  the right heart.  7. Estimated pulmonary artery systolic pressure equals 58  mm Hg, assuming right atrial pressure equals 8 mm Hg,  consistent with moderate pulmonary pressures.      ASSESSMENT & PLAN:    64M w/ hx CHF  EF 20%, AICD [employer]  2014 , HTN, HLD, GERD, Rheumatoid arthritis, PAD, CAD/  MI w/ 9  stents, anterior STEMI c/b cardiogenic shock req restent LAD (in-stent thrombosis) and Impella (9/11/2011), hx of LV thrombus (On Coumadin) , 4/15 pt had NSTEMI with RODRIGO to pCX with Impella support admitted for NSTEMI w/ cath showing 80%osteal LAD, 100% dRCA, 50% prox circ, LVEDP 48 course c/b hypertensive emergency leading to flash pulmonary edema, now in acute on chronic decompensated HF.    Neuro:  AAO x 4      CV:  NSTEMI with coronary angiogram that showed 80%osteal LAD, 100% dRCA, 50% prox circ, LVEDP 48  s/p asa and brillinta load, c/w asa 81mg and brilinta 90 mg BID  c/w atorvastatin, recheck hga1c, lipid profile  - start heparin gtt after radial band is removed and INR < 2  - cont to trend cardiac enzymes until peaked  - pending final recommendation from interventional cardiologist regarding management of the lesions : CT surgery eval vs medical optimization    acute on chronic decompensated HF secondary ischemic cardiomyopathy  - c/w bumex 2mg/hr gtt, last known EF 23% in 11/2018 with severe segmental LV dysfunction, elevated pro-BNP  - s/p lasix 80 IVP x 1 and bumex 2mg IVP x1  - start captopril 6.25 BID, hold beta blocker i/s/o ADHF  - repeat TTE  - strict I&O, daily standing weights  - c/w bipap PRN for respiratory distress    hx of LV thrombus  - prior echo was negative for LV thrombus, pt mentioned has "remanent" LV thrombus  - currently inr supratherapeutic, hold coumadin      Pulm:  Acute respiratory distress in setting of pulmonary vascular congestion  - c/w BIPAP to reduce preload, give bipap break anisa for AM meds  ABG consistent with respiratory and metabolic acidosis  -cont to tend q6h    GI:  NPO while on BIPAP    Endo:  last A1c 6.3 in 2016  recheck a1c, tsh    Renal:  UOP improved after bumex,     DVT ppx:  hold i/s/o supratherapeutic INR    Ariadne Reddy PGY-2  Pager # [phone]/ [phone]      CCU Fellow Addendum    65 yo male w h/o ischemic cardiomyopathy (EF 23%) s/p ICD, LV thrombus (Coumadin), CAD s/p 9 stents, HTN, HLD, PAD, RA p/w chest pain, admitted with acute decompensated HFrEF, NSTEMI.    - Flash pulmonary edema after diagnostic cath. Treated with BIPAP, nitro gtt, lasix 120 IV and then gtt. Urine output improved after Bumex 2 IV. Pt is clinically improved at this time, will wean BIPAP as tolerated.  - Switch from Lasix gtt to Bumex gtt  - Afterload reduction with Captopril, uptitrate as tolerated  - Wean nitro gtt  - Medical management of NSTEMI with heparin gtt, DAPT, Lipitor  - Repeat TTE  - Will discuss possible role for revascularization with interventionalist    Sriram Bravo MD  Cardiology Fellow  [id number] CCU Accept Note    Transfer from:   ( X  )[facility]    Accepting physican: Dr. Love Wallace    HPI: 64M w/ hx CHF  EF 20%, AICD [employer]  2014 , HTN, HLD, GERD, Rheumatoid arthritis, PAD, CAD/  MI w/ 9  stents, anterior STEMI c/b cardiogenic shock req restent LAD (in-stent thrombosis) and Impella (9/11/2011), hx of LV thrombus (On Coumadin) , 4/15 pt had NSTEMI with RODRIGO to pCX with Impella support.  Pt came to ER today with chest pain.  Pt was standing waiting to go to his EP doctors appointment when the pain started at 9AM .  Was associated with some SOB and inability to breathe in.  Symptoms have persisted til; now although they are waning in severity. EKG was negative for ST changes or T wave inversion, troponin elevated. Patient was taken for coronary angiogram  by Dr. Ferro. Cath via Right radial artery showed 80% occlusion to the osteal LAD, 100% occlusion of distal RCA and 50% occlusion, EDP 48. Lasix 40mg IV x 1 with plans to start a gtt and transferred to [facility].    Tonight an RRT was called for respiratory distress. Pt hypertensive, likely secondary to flash pulmonary edema.  Diffuse crackles. Bipap ordered. Patient given additional 80mg IV Lasix x 1, gtt started. Pillai placed.  BP initially in 150s, rapidly increasing to the 180s.  Nitro gtt started for BP control per Dr. Bravo. Patient with slight decrease in respiratory distress. Patient alert and oriented throughout. Plan to continue bipap and diuresis and transfer to CCU.    While waiting for CCU bed, patient became hypotensive to the 70s.  Briefly held NTG without an increase in SBP.  Lasix held until SBP in 80s, then restarted.  SBP slowly uptrending to 120 and restarted NTG. BIPAP settings optimized, achieving VT > 500. however, no improvement noted in respiratory effort. Patient becoming increasingly diaphoretic, remaining tachypnea to the 40s.  Multiple attempts to get ABG unsuccessful (one wrist with radial band still in place).  Dr. Bravo at bedside to re-eval patient.  Bumex 2mg IV x 1 given.  Would like to continue BIPAP for now and continue to avoid intubation at this time.  CXR done showing diffuse pulmonary edema. Pillai changed in case of possible obstruction, though flushing easily.    Post Bumex, patient appears to be diuresing better.  Resp rate dropping to 30s.  No longer diaphoretic.  Noted to have a decrease in accessory muscle use.     On my interview patient is in mild respiratory distress. c/o 4/10 dull non radiating left chest pain, pt notes chest pain has improved dramatically from AM. Notes SOB improved on bipap and bumex. Denied other ROS.    SUBJECTIVE DATA:    Home Medications:  carvedilol 6.25 mg oral tablet: 1 tab(s) orally every 12 hours (08 Apr 2019 18:16)  clopidogrel 75 mg oral tablet: 1 tab(s) orally once a day (in the morning) (08 Apr 2019 18:16)  digoxin 125 mcg (0.125 mg) oral tablet: 1 tab(s) orally once a day (08 Apr 2019 18:16)  folic acid 1 mg oral tablet: 1 tab(s) orally once a day (08 Apr 2019 18:16)  furosemide 40 mg oral tablet: 1 tab(s) orally once a day (in the evening) (08 Apr 2019 18:16)  isosorbide mononitrate 120 mg oral tablet, extended release: 1 tab(s) orally 2 times a day (08 Apr 2019 18:16)  lisinopril 5 mg oral tablet: 1 tab(s) orally once a day (in the morning) (08 Apr 2019 18:16)  methotrexate 2.5 mg oral tablet: 8 tab(s) orally once a week  MONDAYS  (08 Apr 2019 18:16)  rosuvastatin 40 mg oral tablet: 1 tab(s) orally once a day (at bedtime) (08 Apr 2019 18:16)  spironolactone 25 mg oral tablet: 1 tab(s) orally once a day (in the morning) (08 Apr 2019 18:16)  warfarin 5 mg oral tablet: 1 tab(s) orally once a day (at bedtime) (08 Apr 2019 18:16)      SOCIAL HX:  denied toxic habits    Allergies:  Penicillin     OBJECTIVE DATA:    Vital Signs Last 24 Hrs  T(C): 36.5 (08 Apr 2019 21:02), Max: 36.7 (08 Apr 2019 18:10)  T(F): 97.7 (08 Apr 2019 21:02), Max: 98 (08 Apr 2019 18:10)  HR: 112 (08 Apr 2019 21:02) (72 - 112)  BP: 92/75 (08 Apr 2019 21:02) (92/75 - 108/80)  BP(mean): 94 (08 Apr 2019 18:10) (94 - 94)  RR: 24 (08 Apr 2019 21:02) (15 - 24)  SpO2: 97% (08 Apr 2019 21:02) (95% - 97%)  I&O's Summary    General: mild respiratory distress on bipap  Neurology: A&Ox3, nonfocal, CROWELL x 4  Eyes: PERRLA/ EOMI, Gross vision intact  ENT/Neck: Neck supple, trachea midline, No JVD, Gross hearing intact  Respiratory: bibasilar crackles  CV: RRR, S1S2, no murmurs, rubs or gallops  Abdominal: Soft, NT, ND +BS,   Extremities: No edema, + peripheral pulses  Skin: No Rashes, Hematoma, Ecchymosis      MEDICATIONS  (STANDING):  buMETAnide Injectable 2 milliGRAM(s) IV Push once  furosemide Infusion 20 mG/Hr (10 mL/Hr) IV Continuous <Continuous>  nitroglycerin  Infusion 10 MICROgram(s)/Min (3 mL/Hr) IV Continuous <Continuous>    MEDICATIONS  (PRN):      LABS                                            14.6                  Neurophils% (auto):   x      (04-08 @ 14:05):    10.1 )-----------(250          Lymphocytes% (auto):  x                                             43.0                   Eosinphils% (auto):   x        Manual%: Neutrophils x    ; Lymphocytes x    ; Eosinophils x    ; Bands%: x    ; Blasts x                                    132    |  97     |  12                  Calcium: 9.3   / iCa: x      (04-08 @ 14:05)    ----------------------------<  120       Magnesium: 2.1                              4.4     |  21     |  0.69             Phosphorous: x        TPro  6.9    /  Alb  3.7    /  TBili  1.3    /  DBili  x      /  AST  106    /  ALT  25     /  AlkPhos  92     08 Apr 2019 14:05    ( 04-08 @ 14:05 )   PT: 37.1 sec;   INR: 3.11 ratio  aPTT: 37.6 sec      Cardiac Cath:    < from: Cardiac Cath Lab - Adult (01.09.17 @ 16:14) >    VENTRICLES: No left ventriculogram was performed.  CORONARY VESSELS: The coronary circulation is right dominant.  LM:   --  LM: Normal.  LAD:   --  Proximal LAD: There was a diffuse 10 % stenosis at the site of a  prior angioplasty with stent placement.  --  Mid LAD: There was a diffuse 10 % stenosis at the site of a prior  angioplasty with stent placement. There was DIANA grade 3 flow through the  vessel (brisk flow).  CX:   --  Proximal circumflex: There was a diffuse 10 % stenosis. There was  DIANA grade 3 flow through the vessel (brisk flow).  RCA:   --  Proximal RCA: There was a diffuse 10 % stenosis at the site of a  prior angioplasty with stent placement.  --  Mid RCA: There was a diffuse 10 % stenosis at the site of a prior  angioplasty with stent placement.  COMPLICATIONS: There were no complications.  DIAGNOSTIC RECOMMENDATIONS: Medical management is recommended.    Echo:   TTE with Doppler (w/Cont) (11.16.18 @ 09:47    Fractional short: 16 %  EF (Corbin Rule): 23 %    Conclusions:  1. Tethered mitral valve leaflets with normal opening.  Severe mitral regurgitation.  2. Left ventricular enlargement.  3. Severe segmental left ventricular systolic dysfunction.  The apex is dyskinetic.  The mid to apical septum and  lateral wall are severely hypokinetic.  The inferior and  inferolateral walls are akinetic.   Endocardial  visualization enhanced with intravenous injection of  Ultrasonic Enhancing Agent (Definity). Left ventricular  thrombus not visualized.  4. Severe diastolic dysfunction (Stage III).  5. Moderate right atrial enlargement.  6. Right ventricular enlargement with normal right  ventricular systolic function.   A device wire is noted in  the right heart.  7. Estimated pulmonary artery systolic pressure equals 58  mm Hg, assuming right atrial pressure equals 8 mm Hg,  consistent with moderate pulmonary pressures.      ASSESSMENT & PLAN:    64M w/ hx CHF  EF 20%, AICD [employer]  2014 , HTN, HLD, GERD, Rheumatoid arthritis, PAD, CAD/  MI w/ 9  stents, anterior STEMI c/b cardiogenic shock req restent LAD (in-stent thrombosis) and Impella (9/11/2011), hx of LV thrombus (On Coumadin) , 4/15 pt had NSTEMI with RODRIGO to pCX with Impella support admitted for NSTEMI w/ cath showing 80%osteal LAD, 100% dRCA, 50% prox circ, LVEDP 48 course c/b hypertensive emergency leading to flash pulmonary edema, now in acute on chronic decompensated HF.    Neuro:  AAO x 4      CV:  NSTEMI with coronary angiogram that showed 80%osteal LAD, 100% dRCA, 50% prox circ, LVEDP 48  s/p asa and brillinta load, c/w asa 81mg and brilinta 90 mg BID  c/w atorvastatin, recheck hga1c, lipid profile  - start heparin gtt after radial band is removed and INR < 2  - cont to trend cardiac enzymes until peaked  - pending final recommendation from interventional cardiologist regarding management of the lesions : CT surgery eval vs medical optimization    acute on chronic decompensated HF secondary ischemic cardiomyopathy  - c/w bumex 2mg/hr gtt, last known EF 23% in 11/2018 with severe segmental LV dysfunction, elevated pro-BNP  - s/p lasix 80 IVP x 1 and bumex 2mg IVP x1  - start captopril 6.25 BID, hold beta blocker i/s/o ADHF  - repeat TTE  - strict I&O, daily standing weights  - c/w bipap PRN for respiratory distress    hx of LV thrombus  - prior echo was negative for LV thrombus, pt mentioned has "remanent" LV thrombus  - currently inr supratherapeutic, hold coumadin      Pulm:  Acute respiratory distress in setting of pulmonary vascular congestion  - c/w BIPAP to reduce preload, give bipap break anisa for AM meds  ABG consistent with respiratory and metabolic acidosis  -cont to tend q6h    GI:  NPO while on BIPAP  elevated AST likely due to cardiac muscle damage, however Tbili rising therefore obtain RUQ US    Endo:  last A1c 6.3 in 2016  recheck a1c, tsh    Renal:  UOP improved after bumex,     DVT ppx:  hold i/s/o supratherapeutic INR    Ariadne Reddy PGY-2  Pager # [phone]/ [phone]      CCU Fellow Addendum    65 yo male w h/o ischemic cardiomyopathy (EF 23%) s/p ICD, LV thrombus (Coumadin), CAD s/p 9 stents, HTN, HLD, PAD, RA p/w chest pain, admitted with acute decompensated HFrEF, NSTEMI.    - Flash pulmonary edema after diagnostic cath. Treated with BIPAP, nitro gtt, lasix 120 IV and then gtt. Urine output improved after Bumex 2 IV. Pt is clinically improved at this time, will wean BIPAP as tolerated.  - Switch from Lasix gtt to Bumex gtt  - Afterload reduction with Captopril, uptitrate as tolerated  - Wean nitro gtt  - Medical management of NSTEMI with heparin gtt, DAPT, Lipitor  - Repeat TTE  - Will discuss possible role for revascularization with interventionalist    Sriram Bravo MD  Cardiology Fellow  [id number]

## 2019-04-08 NOTE — H&P CARDIOLOGY - HISTORY OF PRESENT ILLNESS
64M w/ hx CHF  EF 20%, AICD [employer] , HTN, HLD, GERD, Rheumatoid arthritis, PAD, CAD/  MI w/ 9  stents, anterior STEMI c/b cardiogenic shock req restent LAD (in-stent thrombosis) and Impella (9/11/2011), hx of LV thrombus, 4/15 pt had NSTEMI with RODRIGO to pCX with Impella support.  Pt came to ER today with chest pain.  Pt was standing waiting to go to his EP doctors appointment when the pain started at 9AM .  Was associated with some SOB  and inability to breathe in.  Symptoms have persisted til; now although they are waning in severity.  Currently still with pain 5/10 but is improved from earlier.  No cough. no fevers, no chills.  Nothing makes pain better, spontaneously getting better    Cardiologist- Dr. Ferro 64M w/ hx CHF  EF 20%, AICD [employer]  2014 , HTN, HLD, GERD, Rheumatoid arthritis, PAD, CAD/  MI w/ 9  stents, anterior STEMI c/b cardiogenic shock req restent LAD (in-stent thrombosis) and Impella (9/11/2011), hx of LV thrombus, 4/15 pt had NSTEMI with RODRIGO to pCX with Impella support.  Pt came to ER today with chest pain.  Pt was standing waiting to go to his EP doctors appointment when the pain started at 9AM .  Was associated with some SOB  and inability to breathe in.  Symptoms have persisted til; now although they are waning in severity.  Brought to cath lab for angiogram with Dr. Ferro.   INR 3.1   Cardiologist- Dr. Ferro   Electrophysiologist- Dr. Bassett    Conclusions:  ECHO EF 23%   1. Tethered mitral valve leaflets with normal opening.  Severe mitral regurgitation.  2. Left ventricular enlargement.  3. Severe segmental left ventricular systolic dysfunction.  The apex is dyskinetic.  The mid to apical septum and  lateral wall are severely hypokinetic.  The inferior and  inferolateral walls are akinetic.   Endocardial  visualization enhanced with intravenous injection of  Ultrasonic Enhancing Agent (Definity). Left ventricular  thrombus not visualized.  4. Severe diastolic dysfunction (Stage III).  5. Moderate right atrial enlargement.  6. Right ventricular enlargement with normal right  ventricular systolic function.   A device wire is noted in  the right heart.  7. Estimated pulmonary artery systolic pressure equals 58  mm Hg, assuming right atrial pressure equals 8 mm Hg,  consistent with moderate pulmonary pressures.  ------------------------------------------------------------------------  Confirmed on  11/16/2018 - 14:49:57 by Haris Kc M.D. 64M w/ hx CHF  EF 20%, AICD [employer]  2014 , HTN, HLD, GERD, Rheumatoid arthritis, PAD, CAD/  MI w/ 9  stents, anterior STEMI c/b cardiogenic shock req restent LAD (in-stent thrombosis) and Impella (9/11/2011), hx of LV thrombus, 4/15 pt had NSTEMI with RODRIGO to pCX with Impella support.  Pt came to ER today with chest pain.  Pt was standing waiting to go to his EP doctors appointment when the pain started at 9AM .  Was associated with some SOB  and inability to breathe in.  Symptoms have persisted til; now although they are waning in severity.  Brought to cath lab for angiogram with Dr. Ferro.   INR 3.1   Cardiologist- Dr. Ferro   Electrophysiologist- Dr. Bassett    Conclusions:  ECHO EF 23%   11/2018   1. Tethered mitral valve leaflets with normal opening.  Severe mitral regurgitation.  2. Left ventricular enlargement.  3. Severe segmental left ventricular systolic dysfunction.  The apex is dyskinetic.  The mid to apical septum and  lateral wall are severely hypokinetic.  The inferior and  inferolateral walls are akinetic.   Endocardial  visualization enhanced with intravenous injection of  Ultrasonic Enhancing Agent (Definity). Left ventricular  thrombus not visualized.  4. Severe diastolic dysfunction (Stage III).  5. Moderate right atrial enlargement.  6. Right ventricular enlargement with normal right  ventricular systolic function.   A device wire is noted in  the right heart.  7. Estimated pulmonary artery systolic pressure equals 58  mm Hg, assuming right atrial pressure equals 8 mm Hg,  consistent with moderate pulmonary pressures.  ------------------------------------------------------------------------  Confirmed on  11/16/2018 - 14:49:57 by Haris Kc M.D. 64M w/ hx CHF  EF 20%, AICD [employer]  2014 , HTN, HLD, GERD, Rheumatoid arthritis, PAD, CAD/  MI w/ 9  stents, anterior STEMI c/b cardiogenic shock req restent LAD (in-stent thrombosis) and Impella (9/11/2011), hx of LV thrombus (On Coumadin) , 4/15 pt had NSTEMI with RODRIGO to pCX with Impella support.  Pt came to ER today with chest pain.  Pt was standing waiting to go to his EP doctors appointment when the pain started at 9AM .  Was associated with some SOB and inability to breathe in.  Symptoms have persisted til; now although they are waning in severity.  Brought to cath lab for angiogram with Dr. Ferro.   INR 3.1 Dr. Ferro aware   Cardiologist- Dr. Ferro   Electrophysiologist- Dr. Bassett    Conclusions:  ECHO EF 23%   11/2018   1. Tethered mitral valve leaflets with normal opening.  Severe mitral regurgitation.  2. Left ventricular enlargement.  3. Severe segmental left ventricular systolic dysfunction.  The apex is dyskinetic.  The mid to apical septum and  lateral wall are severely hypokinetic.  The inferior and  inferolateral walls are akinetic.   Endocardial  visualization enhanced with intravenous injection of  Ultrasonic Enhancing Agent (Definity). Left ventricular  thrombus not visualized.  4. Severe diastolic dysfunction (Stage III).  5. Moderate right atrial enlargement.  6. Right ventricular enlargement with normal right  ventricular systolic function.   A device wire is noted in  the right heart.  7. Estimated pulmonary artery systolic pressure equals 58  mm Hg, assuming right atrial pressure equals 8 mm Hg,  consistent with moderate pulmonary pressures.  ------------------------------------------------------------------------  Confirmed on  11/16/2018 - 14:49:57 by Haris Kc M.D.

## 2019-04-08 NOTE — ED PROVIDER NOTE - OBJECTIVE STATEMENT
63M w/ hx CHF  EF 20%, AICD, HTN, HLD, GERD, Rheumatoid arthritis, PAD, CAD and MI w/9  stents, anterior STEMI c/b cardiogenic shock req restent LAD (in-stent thrombosis) and Impella (9/11/2011), hx of LV thrombus, 4/15 pt had NSTEMI with RODRIGO to pCX with Impella support. 64M w/ hx CHF  EF 20%, AICD, HTN, HLD, GERD, Rheumatoid arthritis, PAD, CAD and MI w/9  stents, anterior STEMI c/b cardiogenic shock req restent LAD (in-stent thrombosis) and Impella (9/11/2011), hx of LV thrombus, 4/15 pt had NSTEMI with RODRIGO to pCX with Impella support.  Pt is coming in today with chest pain.  Pt was standing waiting to go to his EP doctors appointment when the pain started.  Was associated with some SoB and inability to breathe in.  Symptoms have persisted til; now although they are waning in severity.  Currently still with pain 5/10 but is improved from earlier.  No cough. no fevers, no chills.  Nothing makes pain better, spontaneously getting better.

## 2019-04-08 NOTE — PROVIDER CONTACT NOTE (OTHER) - BACKGROUND
A&OX4, pt is to be admitted fr. cath lab, s/p left radial diagnostic cath, radial band in placed, no hematoma, no bleeding, positive b/l radial pulses. BASHIR Plummer notified Dr. Ferro

## 2019-04-08 NOTE — PROVIDER CONTACT NOTE (OTHER) - ASSESSMENT
pt is in respiratory distress, pt was given Lasix IVP during the procedure, no urine output. Pt needs intervention but the plan is to diuresis first.

## 2019-04-08 NOTE — H&P CARDIOLOGY - PMH
CAD (coronary artery disease)    Chronic systolic congestive heart failure  EF15%-35%  GERD (gastroesophageal reflux disease)    H/O hyperlipidemia    HTN (hypertension)    Intermittent claudication    Joint pain, foot  in toes  Left ventricular thrombus    Moderate to severe pulmonary hypertension    PAD (peripheral artery disease)    Rheumatoid arthritis    Severe mitral regurgitation    STEMI (ST elevation myocardial infarction)  AWMI with cardiogenic shock 9/11/11- with emergent stent to LAD/impella at [facility]  Systolic heart failure  EF 29% via TTE 4/15

## 2019-04-08 NOTE — ED ADULT TRIAGE NOTE - CHIEF COMPLAINT QUOTE
left sided chest pain x3 hours, dull in nature with SOB and diaphoresis . PMH 9 stents, CHF, MI, pacemaker

## 2019-04-09 LAB
ALBUMIN SERPL ELPH-MCNC: 3.9 G/DL — SIGNIFICANT CHANGE UP (ref 3.3–5)
ALBUMIN SERPL ELPH-MCNC: 3.9 G/DL — SIGNIFICANT CHANGE UP (ref 3.3–5)
ALBUMIN SERPL ELPH-MCNC: 4.4 G/DL — SIGNIFICANT CHANGE UP (ref 3.3–5)
ALP SERPL-CCNC: 100 U/L — SIGNIFICANT CHANGE UP (ref 40–120)
ALP SERPL-CCNC: 111 U/L — SIGNIFICANT CHANGE UP (ref 40–120)
ALP SERPL-CCNC: 98 U/L — SIGNIFICANT CHANGE UP (ref 40–120)
ALT FLD-CCNC: 38 U/L — SIGNIFICANT CHANGE UP (ref 10–45)
ALT FLD-CCNC: 40 U/L — SIGNIFICANT CHANGE UP (ref 10–45)
ALT FLD-CCNC: 45 U/L — SIGNIFICANT CHANGE UP (ref 10–45)
ANION GAP SERPL CALC-SCNC: 16 MMOL/L — SIGNIFICANT CHANGE UP (ref 5–17)
ANION GAP SERPL CALC-SCNC: 18 MMOL/L — HIGH (ref 5–17)
ANION GAP SERPL CALC-SCNC: 18 MMOL/L — HIGH (ref 5–17)
APTT BLD: 39.5 SEC — HIGH (ref 27.5–36.3)
APTT BLD: 39.9 SEC — HIGH (ref 27.5–36.3)
AST SERPL-CCNC: 194 U/L — HIGH (ref 10–40)
AST SERPL-CCNC: 232 U/L — HIGH (ref 10–40)
AST SERPL-CCNC: 244 U/L — HIGH (ref 10–40)
BASE EXCESS BLDV CALC-SCNC: 1.9 MMOL/L — SIGNIFICANT CHANGE UP (ref -2–2)
BILIRUB DIRECT SERPL-MCNC: 0.2 MG/DL — SIGNIFICANT CHANGE UP (ref 0–0.2)
BILIRUB SERPL-MCNC: 1.9 MG/DL — HIGH (ref 0.2–1.2)
BILIRUB SERPL-MCNC: 2.7 MG/DL — HIGH (ref 0.2–1.2)
BILIRUB SERPL-MCNC: 3.5 MG/DL — HIGH (ref 0.2–1.2)
BLD GP AB SCN SERPL QL: NEGATIVE — SIGNIFICANT CHANGE UP
BUN SERPL-MCNC: 14 MG/DL — SIGNIFICANT CHANGE UP (ref 7–23)
BUN SERPL-MCNC: 17 MG/DL — SIGNIFICANT CHANGE UP (ref 7–23)
BUN SERPL-MCNC: 24 MG/DL — HIGH (ref 7–23)
CA-I SERPL-SCNC: 1.11 MMOL/L — LOW (ref 1.12–1.3)
CALCIUM SERPL-MCNC: 9 MG/DL — SIGNIFICANT CHANGE UP (ref 8.4–10.5)
CALCIUM SERPL-MCNC: 9.2 MG/DL — SIGNIFICANT CHANGE UP (ref 8.4–10.5)
CALCIUM SERPL-MCNC: 9.5 MG/DL — SIGNIFICANT CHANGE UP (ref 8.4–10.5)
CHLORIDE BLDV-SCNC: 98 MMOL/L — SIGNIFICANT CHANGE UP (ref 96–108)
CHLORIDE SERPL-SCNC: 87 MMOL/L — LOW (ref 96–108)
CHLORIDE SERPL-SCNC: 91 MMOL/L — LOW (ref 96–108)
CHLORIDE SERPL-SCNC: 94 MMOL/L — LOW (ref 96–108)
CHOLEST SERPL-MCNC: 107 MG/DL — SIGNIFICANT CHANGE UP (ref 10–199)
CK MB BLD-MCNC: 10.7 % — HIGH (ref 0–3.5)
CK MB BLD-MCNC: 13.6 % — HIGH (ref 0–3.5)
CK MB CFR SERPL CALC: 186.9 NG/ML — HIGH (ref 0–6.7)
CK MB CFR SERPL CALC: 187.2 NG/ML — HIGH (ref 0–6.7)
CK MB CFR SERPL CALC: 295.8 NG/ML — HIGH (ref 0–6.7)
CK SERPL-CCNC: 1412 U/L — HIGH (ref 30–200)
CK SERPL-CCNC: 1749 U/L — HIGH (ref 30–200)
CK SERPL-CCNC: 2177 U/L — HIGH (ref 30–200)
CO2 BLDV-SCNC: 27 MMOL/L — SIGNIFICANT CHANGE UP (ref 22–30)
CO2 SERPL-SCNC: 22 MMOL/L — SIGNIFICANT CHANGE UP (ref 22–31)
CO2 SERPL-SCNC: 26 MMOL/L — SIGNIFICANT CHANGE UP (ref 22–31)
CO2 SERPL-SCNC: 27 MMOL/L — SIGNIFICANT CHANGE UP (ref 22–31)
CREAT SERPL-MCNC: 0.7 MG/DL — SIGNIFICANT CHANGE UP (ref 0.5–1.3)
CREAT SERPL-MCNC: 0.78 MG/DL — SIGNIFICANT CHANGE UP (ref 0.5–1.3)
CREAT SERPL-MCNC: 1.05 MG/DL — SIGNIFICANT CHANGE UP (ref 0.5–1.3)
GAS PNL BLDV: 131 MMOL/L — LOW (ref 136–145)
GAS PNL BLDV: SIGNIFICANT CHANGE UP
GAS PNL BLDV: SIGNIFICANT CHANGE UP
GLUCOSE BLDV-MCNC: 160 MG/DL — HIGH (ref 70–99)
GLUCOSE SERPL-MCNC: 113 MG/DL — HIGH (ref 70–99)
GLUCOSE SERPL-MCNC: 151 MG/DL — HIGH (ref 70–99)
GLUCOSE SERPL-MCNC: 162 MG/DL — HIGH (ref 70–99)
HBA1C BLD-MCNC: 6 % — HIGH (ref 4–5.6)
HCO3 BLDV-SCNC: 26 MMOL/L — SIGNIFICANT CHANGE UP (ref 21–29)
HCT VFR BLD CALC: 48.3 % — SIGNIFICANT CHANGE UP (ref 39–50)
HCT VFR BLDA CALC: 49 % — SIGNIFICANT CHANGE UP (ref 39–50)
HDLC SERPL-MCNC: 30 MG/DL — LOW
HGB BLD CALC-MCNC: 16 G/DL — SIGNIFICANT CHANGE UP (ref 13–17)
HGB BLD-MCNC: 15.8 G/DL — SIGNIFICANT CHANGE UP (ref 13–17)
HOROWITZ INDEX BLDV+IHG-RTO: 60 — SIGNIFICANT CHANGE UP
INR BLD: 2.67 RATIO — HIGH (ref 0.88–1.16)
INR BLD: 3.42 RATIO — HIGH (ref 0.88–1.16)
INR BLD: 3.71 RATIO — HIGH (ref 0.88–1.16)
LACTATE BLDV-MCNC: 2.8 MMOL/L — HIGH (ref 0.7–2)
LACTATE SERPL-SCNC: 2.2 MMOL/L — HIGH (ref 0.7–2)
LIPID PNL WITH DIRECT LDL SERPL: 60 MG/DL — SIGNIFICANT CHANGE UP
MAGNESIUM SERPL-MCNC: 1.9 MG/DL — SIGNIFICANT CHANGE UP (ref 1.6–2.6)
MAGNESIUM SERPL-MCNC: 2.2 MG/DL — SIGNIFICANT CHANGE UP (ref 1.6–2.6)
MAGNESIUM SERPL-MCNC: 2.2 MG/DL — SIGNIFICANT CHANGE UP (ref 1.6–2.6)
MCHC RBC-ENTMCNC: 29.2 PG — SIGNIFICANT CHANGE UP (ref 27–34)
MCHC RBC-ENTMCNC: 32.6 GM/DL — SIGNIFICANT CHANGE UP (ref 32–36)
MCV RBC AUTO: 89.4 FL — SIGNIFICANT CHANGE UP (ref 80–100)
OTHER CELLS CSF MANUAL: 17 ML/DL — LOW (ref 18–22)
PCO2 BLDV: 41 MMHG — SIGNIFICANT CHANGE UP (ref 35–50)
PH BLDV: 7.42 — SIGNIFICANT CHANGE UP (ref 7.35–7.45)
PHOSPHATE SERPL-MCNC: 3.5 MG/DL — SIGNIFICANT CHANGE UP (ref 2.5–4.5)
PLATELET # BLD AUTO: 264 K/UL — SIGNIFICANT CHANGE UP (ref 150–400)
PO2 BLDV: 44 MMHG — SIGNIFICANT CHANGE UP (ref 25–45)
POTASSIUM BLDV-SCNC: 3.8 MMOL/L — SIGNIFICANT CHANGE UP (ref 3.5–5.3)
POTASSIUM SERPL-MCNC: 3.8 MMOL/L — SIGNIFICANT CHANGE UP (ref 3.5–5.3)
POTASSIUM SERPL-MCNC: 3.8 MMOL/L — SIGNIFICANT CHANGE UP (ref 3.5–5.3)
POTASSIUM SERPL-MCNC: 4 MMOL/L — SIGNIFICANT CHANGE UP (ref 3.5–5.3)
POTASSIUM SERPL-SCNC: 3.8 MMOL/L — SIGNIFICANT CHANGE UP (ref 3.5–5.3)
POTASSIUM SERPL-SCNC: 3.8 MMOL/L — SIGNIFICANT CHANGE UP (ref 3.5–5.3)
POTASSIUM SERPL-SCNC: 4 MMOL/L — SIGNIFICANT CHANGE UP (ref 3.5–5.3)
PROT SERPL-MCNC: 7.5 G/DL — SIGNIFICANT CHANGE UP (ref 6–8.3)
PROT SERPL-MCNC: 7.5 G/DL — SIGNIFICANT CHANGE UP (ref 6–8.3)
PROT SERPL-MCNC: 8.3 G/DL — SIGNIFICANT CHANGE UP (ref 6–8.3)
PROTHROM AB SERPL-ACNC: 31.4 SEC — HIGH (ref 10–12.9)
PROTHROM AB SERPL-ACNC: 40.8 SEC — HIGH (ref 10–12.9)
PROTHROM AB SERPL-ACNC: 44.1 SEC — HIGH (ref 10–12.9)
RBC # BLD: 5.4 M/UL — SIGNIFICANT CHANGE UP (ref 4.2–5.8)
RBC # FLD: 13.5 % — SIGNIFICANT CHANGE UP (ref 10.3–14.5)
RH IG SCN BLD-IMP: POSITIVE — SIGNIFICANT CHANGE UP
SAO2 % BLDV: 79 % — SIGNIFICANT CHANGE UP (ref 67–88)
SODIUM SERPL-SCNC: 132 MMOL/L — LOW (ref 135–145)
SODIUM SERPL-SCNC: 133 MMOL/L — LOW (ref 135–145)
SODIUM SERPL-SCNC: 134 MMOL/L — LOW (ref 135–145)
TOTAL CHOLESTEROL/HDL RATIO MEASUREMENT: 3.6 RATIO — SIGNIFICANT CHANGE UP (ref 3.4–9.6)
TRIGL SERPL-MCNC: 87 MG/DL — SIGNIFICANT CHANGE UP (ref 10–149)
TROPONIN T, HIGH SENSITIVITY RESULT: 2887 NG/L — HIGH (ref 0–51)
TROPONIN T, HIGH SENSITIVITY RESULT: 3169 NG/L — HIGH (ref 0–51)
TSH SERPL-MCNC: 0.54 UIU/ML — SIGNIFICANT CHANGE UP (ref 0.27–4.2)
WBC # BLD: 17.5 K/UL — HIGH (ref 3.8–10.5)
WBC # FLD AUTO: 17.5 K/UL — HIGH (ref 3.8–10.5)

## 2019-04-09 PROCEDURE — 76705 ECHO EXAM OF ABDOMEN: CPT | Mod: 26,RT

## 2019-04-09 PROCEDURE — 93306 TTE W/DOPPLER COMPLETE: CPT | Mod: 26

## 2019-04-09 PROCEDURE — 71045 X-RAY EXAM CHEST 1 VIEW: CPT | Mod: 26

## 2019-04-09 PROCEDURE — 93010 ELECTROCARDIOGRAM REPORT: CPT

## 2019-04-09 RX ORDER — POTASSIUM CHLORIDE 20 MEQ
20 PACKET (EA) ORAL ONCE
Qty: 0 | Refills: 0 | Status: COMPLETED | OUTPATIENT
Start: 2019-04-09 | End: 2019-04-09

## 2019-04-09 RX ORDER — HEPARIN SODIUM 5000 [USP'U]/ML
INJECTION INTRAVENOUS; SUBCUTANEOUS
Qty: 25000 | Refills: 0 | Status: DISCONTINUED | OUTPATIENT
Start: 2019-04-09 | End: 2019-04-09

## 2019-04-09 RX ORDER — HEPARIN SODIUM 5000 [USP'U]/ML
5000 INJECTION INTRAVENOUS; SUBCUTANEOUS EVERY 6 HOURS
Qty: 0 | Refills: 0 | Status: DISCONTINUED | OUTPATIENT
Start: 2019-04-09 | End: 2019-04-09

## 2019-04-09 RX ORDER — POTASSIUM CHLORIDE 20 MEQ
40 PACKET (EA) ORAL ONCE
Qty: 0 | Refills: 0 | Status: COMPLETED | OUTPATIENT
Start: 2019-04-09 | End: 2019-04-09

## 2019-04-09 RX ORDER — MAGNESIUM SULFATE 500 MG/ML
1 VIAL (ML) INJECTION ONCE
Qty: 0 | Refills: 0 | Status: COMPLETED | OUTPATIENT
Start: 2019-04-09 | End: 2019-04-09

## 2019-04-09 RX ORDER — POLYETHYLENE GLYCOL 3350 17 G/17G
17 POWDER, FOR SOLUTION ORAL ONCE
Qty: 0 | Refills: 0 | Status: COMPLETED | OUTPATIENT
Start: 2019-04-09 | End: 2019-04-09

## 2019-04-09 RX ADMIN — ATORVASTATIN CALCIUM 80 MILLIGRAM(S): 80 TABLET, FILM COATED ORAL at 01:20

## 2019-04-09 RX ADMIN — Medication 20 MILLIEQUIVALENT(S): at 13:51

## 2019-04-09 RX ADMIN — Medication 30 MILLILITER(S): at 14:41

## 2019-04-09 RX ADMIN — Medication 81 MILLIGRAM(S): at 11:56

## 2019-04-09 RX ADMIN — TICAGRELOR 90 MILLIGRAM(S): 90 TABLET ORAL at 17:08

## 2019-04-09 RX ADMIN — BUMETANIDE 10 MG/HR: 0.25 INJECTION INTRAMUSCULAR; INTRAVENOUS at 00:53

## 2019-04-09 RX ADMIN — TICAGRELOR 90 MILLIGRAM(S): 90 TABLET ORAL at 05:41

## 2019-04-09 RX ADMIN — Medication 40 MILLIEQUIVALENT(S): at 22:17

## 2019-04-09 RX ADMIN — Medication 100 GRAM(S): at 05:41

## 2019-04-09 RX ADMIN — BUMETANIDE 10 MG/HR: 0.25 INJECTION INTRAMUSCULAR; INTRAVENOUS at 07:02

## 2019-04-09 RX ADMIN — BUMETANIDE 10 MG/HR: 0.25 INJECTION INTRAMUSCULAR; INTRAVENOUS at 22:17

## 2019-04-09 RX ADMIN — ATORVASTATIN CALCIUM 80 MILLIGRAM(S): 80 TABLET, FILM COATED ORAL at 22:16

## 2019-04-09 NOTE — PROGRESS NOTE ADULT - SUBJECTIVE AND OBJECTIVE BOX
CCU MIDNIGHT ROUNDS    Patient is a 64y old  Male who presents with a chief complaint of NSTEMI , AdHF (09 Apr 2019 07:50)    Event	  HPI:  64M w/ hx CHF  EF 20%, AICD [employer]  2014 , HTN, HLD, GERD, Rheumatoid arthritis, PAD, CAD/  MI w/ 9  stents, anterior STEMI c/b cardiogenic shock req restent LAD (in-stent thrombosis) and Impella (9/11/2011), hx of LV thrombus (On Coumadin) , 4/15 pt had NSTEMI with RODRIGO to pCX with Impella support admitted for NSTEMI w/ cath showing 80%osteal LAD, 100% dRCA, 50% prox circ, LVEDP 48 course c/b hypertensive emergency leading to flash pulmonary edema, now in acute on chronic decompensated HF.    Cardiologist- Dr. Ferro   Electrophysiologist- Dr. Bassett    Conclusions:  ECHO EF 23%   11/2018   1. Tethered mitral valve leaflets with normal opening.  Severe mitral regurgitation.  2. Left ventricular enlargement.  3. Severe segmental left ventricular systolic dysfunction.  The apex is dyskinetic.  The mid to apical septum and  lateral wall are severely hypokinetic.  The inferior and  inferolateral walls are akinetic.   Endocardial  visualization enhanced with intravenous injection of  Ultrasonic Enhancing Agent (Definity). Left ventricular  thrombus not visualized.  4. Severe diastolic dysfunction (Stage III).  5. Moderate right atrial enlargement.  6. Right ventricular enlargement with normal right  ventricular systolic function.   A device wire is noted in  the right heart.  7. Estimated pulmonary artery systolic pressure equals 58  mm Hg, assuming right atrial pressure equals 8 mm Hg,  consistent with moderate pulmonary pressures.  ------------------------------------------------------------------------  Confirmed on  11/16/2018 - 14:49:57 by Haris Kc M.D. (08 Apr 2019 18:10)    MEDICATIONS  (STANDING):  aspirin enteric coated 81 milliGRAM(s) Oral daily  atorvastatin 80 milliGRAM(s) Oral at bedtime  buMETAnide Infusion 2 mG/Hr (10 mL/Hr) IV Continuous <Continuous>  ticagrelor 90 milliGRAM(s) Oral two times a day    MEDICATIONS  (PRN):  aluminum hydroxide/magnesium hydroxide/simethicone Suspension 30 milliLiter(s) Oral every 6 hours PRN Dyspepsia      REVIEW OF SYSTEMS:  Otherwise, 10 point ROS done and otherwise negative.    PHYSICAL EXAM:  Vital Signs Last 24 Hrs  T(C): 37 (09 Apr 2019 20:00), Max: 37.2 (09 Apr 2019 15:00)  T(F): 98.6 (09 Apr 2019 20:00), Max: 98.9 (09 Apr 2019 15:00)  HR: 90 (09 Apr 2019 23:00) (74 - 100)  BP: 171/72 (09 Apr 2019 23:00) (74/63 - 171/72)  BP(mean): 114 (09 Apr 2019 23:00) (57 - 114)  RR: 16 (09 Apr 2019 23:00) (16 - 27)  SpO2: 93% (09 Apr 2019 23:00) (89% - 100%)  I&O's Summary    08 Apr 2019 07:01  -  09 Apr 2019 07:00  --------------------------------------------------------  IN: 430 mL / OUT: 3050 mL / NET: -2620 mL    09 Apr 2019 07:01  -  09 Apr 2019 23:45  --------------------------------------------------------  IN: 1610 mL / OUT: 2925 mL / NET: -1315 mL        Appearance: Normal	  HEENT:   Normal oral mucosa, PERRL, EOMI	  Lymphatic: No lymphadenopathy  Cardiovascular: Normal S1 S2, No JVD, No murmurs, No edema  Respiratory: Lungs clear to auscultation	  Psychiatry: A & O x 3, Mood & affect appropriate  Gastrointestinal:  Soft, Non-tender, + BS	  Skin: No rashes, No ecchymoses, No cyanosis	  Neurologic: Non-focal  Extremities: Normal range of motion, No clubbing, cyanosis or edema  Vascular: Peripheral pulses palpable 2+ bilaterally    LABS:	 	                        15.8   17.5  )-----------( 264      ( 09 Apr 2019 03:28 )             48.3     Auto Eosinophil # x     / Auto Eosinophil % x     / Auto Neutrophil # x     / Auto Neutrophil % x     / BANDS % x                            16.2   18.9  )-----------( 303      ( 08 Apr 2019 22:35 )             49.3     Auto Eosinophil # 0.1   / Auto Eosinophil % 0.4   / Auto Neutrophil # 16.6  / Auto Neutrophil % 86.0  / BANDS % x                            14.6   10.1  )-----------( 250      ( 08 Apr 2019 14:05 )             43.0     Auto Eosinophil # x     / Auto Eosinophil % x     / Auto Neutrophil # x     / Auto Neutrophil % x     / BANDS % x        INR: 2.67 ratio (04-09 @ 20:39)  INR: 3.71 ratio (04-09 @ 12:00)  INR: 3.42 ratio (04-09 @ 03:28)    04-09    132<L>  |  87<L>  |  24<H>  ----------------------------<  113<H>  3.8   |  27  |  1.05  04-09    133<L>  |  91<L>  |  17  ----------------------------<  151<H>  3.8   |  26  |  0.78  04-09    134<L>  |  94<L>  |  14  ----------------------------<  162<H>  4.0   |  22  |  0.70    Ca    9.5      09 Apr 2019 20:39  Mg     2.2     04-09  Phos  3.5     04-09  TPro  8.3  /  Alb  4.4  /  TBili  3.5<H>  /  DBili  x   /  AST  194<H>  /  ALT  45  /  AlkPhos  111  04-09  TPro  7.5  /  Alb  3.9  /  TBili  2.7<H>  /  DBili  0.2  /  AST  232<H>  /  ALT  40  /  AlkPhos  98  04-09  TPro  7.5  /  Alb  3.9  /  TBili  1.9<H>  /  DBili  x   /  AST  244<H>  /  ALT  38  /  AlkPhos  100  04-09    Lactate, Blood: 2.2 mmol/L (04-09 @ 03:28)      proBNP: Serum Pro-Brain Natriuretic Peptide: 1256 pg/mL (04-08 @ 14:05)    Lipid Profile: 04-09 Chol 107 LDL 60 HDL 30<L> Trig 87  HgA1c: 6.0 % (04-09 @ 09:22)    TSH: Thyroid Stimulating Hormone, Serum: 0.54 uIU/mL (04-09 @ 09:22)      CARDIAC MARKERS:   09 Apr 2019 12:00 Troponin x     / Creatine Kinase 1749 U/L /  CKMB 187.2 ng/mL / CPK Mass Assay % 10.7 %   09 Apr 2019 03:28 Troponin x     / Creatine Kinase 2177 U/L /  CKMB 295.8 ng/mL / CPK Mass Assay % 13.6 %   08 Apr 2019 22:35 Troponin x     / Creatine Kinase 1412 U/L /  CKMB 186.9 ng/mL / CPK Mass Assay % x

## 2019-04-09 NOTE — PROCEDURE NOTE - ADDITIONAL PROCEDURE DETAILS
call to check ICD for heart failure check for arrhythmia's   normal sensing and pacing threshold stable lead impedence, several ventricular high rate episodes. review of available 3 EGMs 4/8 lasting seconds c/w likely afib with RVR, based on significant irregularity as this is a single RV lead device.     [id number]

## 2019-04-09 NOTE — PROGRESS NOTE ADULT - SUBJECTIVE AND OBJECTIVE BOX
Events:    Review Of Systems:  Constitutional: denies fever, chills, Fatigue   HEENT: denies Blurred vision, Eye Pain, Headache   Respiratory: denies Cough, Wheezing , Shortness of breath  Cardiovascular: denies Chest Pain, Palpitations,  RIBEIRO   Gastrointestinal: denies Abdominal Pain, Diarrhea, Constipation   Genitourinary: denies Nocturia, Dysuria, Incontinence  Extremities: denies Swelling, Joint Pain  Neurologic: denies Focal deficit, Paresthesias, Syncope  Lymphatic: denies Swelling, Lymphadenopathy   Skin: denies Rash, Ecchymoses, Wounds   Psychiatry: denies Depression, Suicidal/Homicidal Ideation, anxiety  [X ] 10 point review of systems is otherwise negative except as mentioned above         Medications:  aspirin enteric coated 81 milliGRAM(s) Oral daily  atorvastatin 80 milliGRAM(s) Oral at bedtime  buMETAnide Infusion 2 mG/Hr IV Continuous <Continuous>  ticagrelor 90 milliGRAM(s) Oral two times a day    PMH/PSH/FH/SH: [ ] Unchanged  Vitals:  T(C): 36.6 (19 @ 03:00), Max: 36.7 (19 @ 18:10)  HR: 76 (19 @ 07:00) (72 - 112)  BP: 84/63 (19 @ 07:00) (75/62 - 108/80)  BP(mean): 69 (19 @ 07:00) (64 - 94)  RR: 19 (19 @ 07:00) (15 - 30)  SpO2: 95% (19 @ 07:00) (89% - 100%)  Wt(kg): --  Daily Height in cm: 180.34 (2019 18:10)    Daily Weight in k.6 (2019 02:00)  I&O's Summary    2019 07:01  -  2019 07:00  --------------------------------------------------------  IN: 430 mL / OUT: 3050 mL / NET: -2620 mL        Physical Exam:  Appearance: [ ] Normal [ ] NAD  Eyes: [ ] PERRL [ ] EOMI  HENT: [ ] Normal oral muscosa [ ]NC/AT  Cardiovascular: [ ] S1 [ ] S2 [ ] RRR [ ] No m/r/g [ ]No edema [ ] JVP  Procedural Access Site: [ ] No hematoma [ ] Non-tender to palpation [ ] 2+ pulse [ ] No bruit [ ] No Ecchymosis  Respiratory: [ ] Clear to auscultation bilaterally  Gastrointestinal: [ ] Soft [ ] Non-tender [ ] Non-distended [ ] BS+  Musculoskeletal: [ ] No clubbing [ ] No joint deformity   Neurologic: [ ] Non-focal  Lymphatic: [ ] No lymphadenopathy  Psychiatry: [ ] AAOx3 [ ] Mood & affect appropriate  Skin: [ ] No rashes [ ] No ecchymoses [ ] No cyanosis        134<L>  |  94<L>  |  14  ----------------------------<  162<H>  4.0   |  22  |  0.70    Ca    9.2      2019 03:28  Phos  3.5       Mg     1.9         TPro  7.5  /  Alb  3.9  /  TBili  1.9<H>  /  DBili  x   /  AST  244<H>  /  ALT  38  /  AlkPhos  100      PT/INR - ( 2019 03:28 )   PT: 40.8 sec;   INR: 3.42 ratio         PTT - ( 2019 03:28 )  PTT:39.5 sec  CARDIAC MARKERS ( 2019 03:28 )  x     / x     / 2177 U/L / x     / 295.8 ng/mL  CARDIAC MARKERS ( 2019 22:35 )  x     / x     / 1412 U/L / x     / 186.9 ng/mL      Serum Pro-Brain Natriuretic Peptide: 1256 pg/mL ( @ 14:05)          ECG:    Echo:    Stress Testing:     Cath:    Imaging:    Interpretation of Telemetry: HPI:  64M w/ hx CHF  EF 20%, AICD [employer]  2014 , HTN, HLD, GERD, Rheumatoid arthritis, PAD, CAD/  MI w/ 9  stents, anterior STEMI c/b cardiogenic shock req restent LAD (in-stent thrombosis) and Impella (2011), hx of LV thrombus (On Coumadin) , /15 pt had NSTEMI with RODRIGO to pCX with Impella support.  Pt came to ER today with chest pain.  Brought to cath lab for angiogram with Dr. Ferro.   INR 3.1. Cath showed 80% ostial LAD, 100% dRCA, 50% pCirc, LVEDP 48. CAD thought to be chronic and chest pain symptoms relatd to volume overload. Pt taken [facility] for [facility]is however complicated by HTN urgency and flash pulm edema. Pt tx to CCU on Bipap, w/     Events:    Review Of Systems:  Constitutional: denies fever, chills, Fatigue   HEENT: denies Blurred vision, Eye Pain, Headache   Respiratory: denies Cough, Wheezing , Shortness of breath  Cardiovascular: denies Chest Pain, Palpitations,  RIBEIRO   Gastrointestinal: denies Abdominal Pain, Diarrhea, Constipation   Genitourinary: denies Nocturia, Dysuria, Incontinence  Extremities: denies Swelling, Joint Pain  Neurologic: denies Focal deficit, Paresthesias, Syncope  Lymphatic: denies Swelling, Lymphadenopathy   Skin: denies Rash, Ecchymoses, Wounds   Psychiatry: denies Depression, Suicidal/Homicidal Ideation, anxiety  [X ] 10 point review of systems is otherwise negative except as mentioned above         Medications:  aspirin enteric coated 81 milliGRAM(s) Oral daily  atorvastatin 80 milliGRAM(s) Oral at bedtime  buMETAnide Infusion 2 mG/Hr IV Continuous <Continuous>  ticagrelor 90 milliGRAM(s) Oral two times a day    Vitals:  T(C): 36.6 (19 @ 03:00), Max: 36.7 (19 @ 18:10)  HR: 76 (19 @ 07:00) (72 - 112)  BP: 84/63 (19 @ 07:00) (75/62 - 108/80)  BP(mean): 69 (19 @ 07:00) (64 - 94)  RR: 19 (19 @ 07:00) (15 - 30)  SpO2: 95% (19 @ 07:00) (89% - 100%)  Wt(kg): --  Daily Height in cm: 180.34 (2019 18:10)    Daily Weight in k.6 (2019 02:00)  I&O's Summary    2019 07:01  -  2019 07:00  --------------------------------------------------------  IN: 430 mL / OUT: 3050 mL / NET: -2620 mL        Physical Exam:  Appearance: [ ] Normal [ ] NAD  Eyes: [ ] PERRL [ ] EOMI  HENT: [ ] Normal oral muscosa [ ]NC/AT  Cardiovascular: [ ] S1 [ ] S2 [ ] RRR [ ] No m/r/g [ ]No edema [ ] JVP  Procedural Access Site: [ ] No hematoma [ ] Non-tender to palpation [ ] 2+ pulse [ ] No bruit [ ] No Ecchymosis  Respiratory: [ ] Clear to auscultation bilaterally  Gastrointestinal: [ ] Soft [ ] Non-tender [ ] Non-distended [ ] BS+  Musculoskeletal: [ ] No clubbing [ ] No joint deformity   Neurologic: [ ] Non-focal  Lymphatic: [ ] No lymphadenopathy  Psychiatry: [ ] AAOx3 [ ] Mood & affect appropriate  Skin: [ ] No rashes [ ] No ecchymoses [ ] No cyanosis        134<L>  |  94<L>  |  14  ----------------------------<  162<H>  4.0   |  22  |  0.70    Ca    9.2      2019 03:28  Phos  3.5       Mg     1.9         TPro  7.5  /  Alb  3.9  /  TBili  1.9<H>  /  DBili  x   /  AST  244<H>  /  ALT  38  /  AlkPhos  100      PT/INR - ( 2019 03:28 )   PT: 40.8 sec;   INR: 3.42 ratio         PTT - ( 2019 03:28 )  PTT:39.5 sec  CARDIAC MARKERS ( 2019 03:28 )  x     / x     / 2177 U/L / x     / 295.8 ng/mL  CARDIAC MARKERS ( 2019 22:35 )  x     / x     / 1412 U/L / x     / 186.9 ng/mL      Serum Pro-Brain Natriuretic Peptide: 1256 pg/mL ( @ 14:05)          ECG:    Echo:    Stress Testing:     Cath:    Imaging:    Interpretation of Telemetry: HPI:  64M w/ hx CHF  EF 20%, AICD [employer]  2014 , HTN, HLD, GERD, Rheumatoid arthritis, PAD, CAD/  MI w/ 9  stents, anterior STEMI c/b cardiogenic shock req restent LAD (in-stent thrombosis) and Impella (2011), hx of LV thrombus (On Coumadin) , 4/15 pt had NSTEMI with RODRIGO to pCX with Impella support.  Pt came to ER today with chest pain.  Brought to cath lab for angiogram with Dr. Ferro.   INR 3.1. Cath showed 80% ostial LAD, 100% dRCA, 50% pCirc, LVEDP 48. CAD thought to be chronic and chest pain symptoms relatd to volume overload. Pt taken [facility] however complicated by HTN urgency and flash pulm edema. Pt tx to CCU on Bipap, w/ lasix push and gtt started. Pt then transitioned 4 hours later to Bumex and now diuresing well.                    Events: Pt switched to Bumex gtt and is now diuresing well off Bipap on 2 L NC. ECHO completed. CE continue to increase.     Review Of Systems:  Constitutional: denies fever, chills, Fatigue   HEENT: denies Blurred vision, Eye Pain, Headache   Respiratory: denies Cough, Wheezing , Shortness of breath  Cardiovascular: denies Chest Pain, Palpitations,  RIBEIRO   Gastrointestinal: denies Abdominal Pain, Diarrhea, Constipation   Genitourinary: denies Nocturia, Dysuria, Incontinence  Extremities: denies Swelling, Joint Pain  Neurologic: denies Focal deficit, Paresthesias, Syncope  Lymphatic: denies Swelling, Lymphadenopathy   Skin: denies Rash, Ecchymoses, Wounds   Psychiatry: denies Depression, Suicidal/Homicidal Ideation, anxiety  [X ] 10 point review of systems is otherwise negative except as mentioned above         Medications:  aspirin enteric coated 81 milliGRAM(s) Oral daily  atorvastatin 80 milliGRAM(s) Oral at bedtime  buMETAnide Infusion 2 mG/Hr IV Continuous <Continuous>  ticagrelor 90 milliGRAM(s) Oral two times a day    Vitals:  T(C): 36.6 (19 @ 03:00), Max: 36.7 (19 @ 18:10)  HR: 76 (19 @ 07:00) (72 - 112)  BP: 84/63 (19 @ 07:00) (75/62 - 108/80)  BP(mean): 69 (19 @ 07:00) (64 - 94)  RR: 19 (19 @ 07:00) (15 - 30)  SpO2: 95% (19 @ 07:00) (89% - 100%)  Daily Height in cm: 180.34 (2019 18:10)    Daily Weight in k.6 (2019 02:00)  I&O's Summary    2019 07:01  -  2019 07:00  --------------------------------------------------------  IN: 430 mL / OUT: 3050 mL / NET: -2620 mL    Physical Exam:  Appearance: [ ] Normal [ ] NAD  Eyes: [ ] PERRL [ ] EOMI  HENT: [ ] Normal oral muscosa [ ]NC/AT  Cardiovascular: [ ] S1 [ ] S2 [ ] RRR [ ] No m/r/g [ ]No edema [ ] JVP  Procedural Access Site: [ ] No hematoma [ ] Non-tender to palpation [ ] 2+ pulse [ ] No bruit [ ] No Ecchymosis  Respiratory: [ ] Clear to auscultation bilaterally  Gastrointestinal: [ ] Soft [ ] Non-tender [ ] Non-distended [ ] BS+  Musculoskeletal: [ ] No clubbing [ ] No joint deformity   Neurologic: [ ] Non-focal  Lymphatic: [ ] No lymphadenopathy  Psychiatry: [ ] AAOx3 [ ] Mood & affect appropriate  Skin: [ ] No rashes [ ] No ecchymoses [ ] No cyanosis        134<L>  |  94<L>  |  14  ----------------------------<  162<H>  4.0   |  22  |  0.70    Ca    9.2      2019 03:28  Phos  3.5     -  Mg     1.9         TPro  7.5  /  Alb  3.9  /  TBili  1.9<H>  /  DBili  x   /  AST  244<H>  /  ALT  38  /  AlkPhos  100  -    PT/INR - ( 2019 03:28 )   PT: 40.8 sec;   INR: 3.42 ratio         PTT - ( 2019 03:28 )  PTT:39.5 sec  CARDIAC MARKERS ( 2019 03:28 )  x     / x     / 2177 U/L / x     / 295.8 ng/mL  CARDIAC MARKERS ( 2019 22:35 )  x     / x     / 1412 U/L / x     / 186.9 ng/mL      Serum Pro-Brain Natriuretic Peptide: 1256 pg/mL ( @ 14:05)    ECG:    Echo: pending    Cath: See HPI above    Imaging: < from: Xray Chest 1 View-PORTABLE IMMEDIATE (19 @ 22:12) >  Increasing bilateral airspace opacities likely   representing worsening pulmonary edema. Findings could also represent   multifocal pneumonia or ARDS.    Interpretation of Telemetry: HPI:  64M w/ hx CHF  EF 20%, AICD [employer]  2014 , HTN, HLD, GERD, Rheumatoid arthritis, PAD, CAD/  MI w/ 9  stents, anterior STEMI c/b cardiogenic shock req restent LAD (in-stent thrombosis) and Impella (2011), hx of LV thrombus (On Coumadin) , 4/15 pt had NSTEMI with RODRIGO to pCX with Impella support.  Pt came to ER today with chest pain.  Brought to cath lab for angiogram with Dr. Ferro.   INR 3.1. Cath showed 80% ostial LAD, 100% dRCA, 50% pCirc, LVEDP 48. CAD thought to be chronic and chest pain symptoms relatd to volume overload. Pt taken [facility] however complicated by HTN urgency and flash pulm edema. Pt tx to CCU on Bipap, w/ lasix push and gtt started. Pt then transitioned 4 hours later to Bumex and now diuresing well.                    Events: Pt switched to Bumex gtt and is now diuresing well off Bipap on 2 L NC. ECHO completed. CE continue to increase.     Review Of Systems:  Constitutional: denies fever, chills, Fatigue   HEENT: denies Blurred vision, Eye Pain, Headache   Respiratory: denies Cough, Wheezing , Shortness of breath  Cardiovascular: denies Chest Pain, Palpitations,  RIBEIRO   Gastrointestinal: denies Abdominal Pain, Diarrhea, Constipation   Genitourinary: denies Nocturia, Dysuria, Incontinence  Extremities: denies Swelling, Joint Pain  Neurologic: denies Focal deficit, Paresthesias, Syncope  Lymphatic: denies Swelling, Lymphadenopathy   Skin: denies Rash, Ecchymoses, Wounds   Psychiatry: denies Depression, Suicidal/Homicidal Ideation, anxiety  [X ] 10 point review of systems is otherwise negative except as mentioned above         Medications:  aspirin enteric coated 81 milliGRAM(s) Oral daily  atorvastatin 80 milliGRAM(s) Oral at bedtime  buMETAnide Infusion 2 mG/Hr IV Continuous <Continuous>  ticagrelor 90 milliGRAM(s) Oral two times a day    Vitals:  T(C): 36.6 (19 @ 03:00), Max: 36.7 (19 @ 18:10)  HR: 76 (19 @ 07:00) (72 - 112)  BP: 84/63 (19 @ 07:00) (75/62 - 108/80)  BP(mean): 69 (19 @ 07:00) (64 - 94)  RR: 19 (19 @ 07:00) (15 - 30)  SpO2: 95% (19 @ 07:00) (89% - 100%)  Daily Height in cm: 180.34 (2019 18:10)    Daily Weight in k.6 (2019 02:00)  I&O's Summary    2019 07:01  -  2019 07:00  --------------------------------------------------------  IN: 430 mL / OUT: 3050 mL / NET: -2620 mL    Physical Exam:  Appearance: [ ] Normal [ ] NAD  Eyes: [ ] PERRL [ ] EOMI  HENT: [ ] Normal oral muscosa [ ]NC/AT  Cardiovascular: [ ] S1 [ ] S2 [ ] RRR [ ] No m/r/g [ ]No edema [ ] JVP  Procedural Access Site: [ ] No hematoma [ ] Non-tender to palpation [ ] 2+ pulse [ ] No bruit [ ] No Ecchymosis  Respiratory: [ ] Clear to auscultation bilaterally  Gastrointestinal: [ ] Soft [ ] Non-tender [ ] Non-distended [ ] BS+  Musculoskeletal: [ ] No clubbing [ ] No joint deformity   Neurologic: [ ] Non-focal  Lymphatic: [ ] No lymphadenopathy  Psychiatry: [ ] AAOx3 [ ] Mood & affect appropriate  Skin: [ ] No rashes [ ] No ecchymoses [ ] No cyanosis        134<L>  |  94<L>  |  14  ----------------------------<  162<H>  4.0   |  22  |  0.70    Ca    9.2      2019 03:28  Phos  3.5     -  Mg     1.9         TPro  7.5  /  Alb  3.9  /  TBili  1.9<H>  /  DBili  x   /  AST  244<H>  /  ALT  38  /  AlkPhos  100  -    PT/INR - ( 2019 03:28 )   PT: 40.8 sec;   INR: 3.42 ratio         PTT - ( 2019 03:28 )  PTT:39.5 sec  CARDIAC MARKERS ( 2019 03:28 )  x     / x     / 2177 U/L / x     / 295.8 ng/mL  CARDIAC MARKERS ( 2019 22:35 )  x     / x     / 1412 U/L / x     / 186.9 ng/mL      Serum Pro-Brain Natriuretic Peptide: 1256 pg/mL ( @ 14:05)    ECG:    Echo: pending    Cath: See HPI above    Imaging: < from: Xray Chest 1 View-PORTABLE IMMEDIATE (19 @ 22:12) >  Increasing bilateral airspace opacities likely   representing worsening pulmonary edema. Findings could also represent   multifocal pneumonia or ARDS.    Interpretation of Telemetry: NSR 78 HPI:  64M w/ hx CHF  EF 20%, AICD [employer]  2014 , HTN, HLD, GERD, Rheumatoid arthritis, PAD, CAD/  MI w/ 9  stents, anterior STEMI c/b cardiogenic shock req restent LAD (in-stent thrombosis) and Impella (2011), hx of LV thrombus (On Coumadin) , 4/15 pt had NSTEMI with RODRIGO to pCX with Impella support.  Pt came to ER today with chest pain.  Brought to cath lab for angiogram with Dr. Ferro.   INR 3.1. Cath showed 80% ostial LAD, 100% dRCA, 50% pCirc, LVEDP 48. CAD thought to be chronic and chest pain symptoms relatd to volume overload. Pt taken [facility] however complicated by HTN urgency and flash pulm edema. Pt tx to CCU on Bipap, w/ lasix push and gtt started. Pt then transitioned 4 hours later to Bumex and now diuresing well.                    Events: Pt switched to Bumex gtt and is now diuresing well off Bipap on 2 L NC. ECHO completed. CE continue to increase.     Review Of Systems:  Constitutional: denies fever, chills, Fatigue   HEENT: denies Blurred vision, Eye Pain, Headache   Respiratory: denies Cough, Wheezing , Shortness of breath  Cardiovascular: denies Chest Pain, Palpitations,  RIBEIRO   Gastrointestinal: denies Abdominal Pain, Diarrhea, Constipation   Genitourinary: denies Nocturia, Dysuria, Incontinence  Extremities: denies Swelling, Joint Pain  Neurologic: denies Focal deficit, Paresthesias, Syncope  Lymphatic: denies Swelling, Lymphadenopathy   Skin: denies Rash, Ecchymoses, Wounds   Psychiatry: denies Depression, Suicidal/Homicidal Ideation, anxiety  [X ] 10 point review of systems is otherwise negative except as mentioned above         Medications:  aspirin enteric coated 81 milliGRAM(s) Oral daily  atorvastatin 80 milliGRAM(s) Oral at bedtime  buMETAnide Infusion 2 mG/Hr IV Continuous <Continuous>  ticagrelor 90 milliGRAM(s) Oral two times a day    Vitals:  T(C): 36.6 (19 @ 03:00), Max: 36.7 (19 @ 18:10)  HR: 76 (19 @ 07:00) (72 - 112)  BP: 84/63 (19 @ 07:00) (75/62 - 108/80)  BP(mean): 69 (19 @ 07:00) (64 - 94)  RR: 19 (19 @ 07:00) (15 - 30)  SpO2: 95% (19 @ 07:00) (89% - 100%)  Daily Height in cm: 180.34 (2019 18:10)    Daily Weight in k.6 (2019 02:00)  I&O's Summary    2019 07:01  -  2019 07:00  --------------------------------------------------------  IN: 430 mL / OUT: 3050 mL / NET: -2620 mL    Physical Exam:  Appearance: [ ] Normal [ ] NAD  Eyes: [ ] PERRL [ ] EOMI  HENT: [ ] Normal oral muscosa [ ]NC/AT  Cardiovascular: [ ] S1 [ ] S2 [ ] RRR [ ] No m/r/g [ ]No edema [ ] JVP  Procedural Access Site: [ ] No hematoma [ ] Non-tender to palpation [ ] 2+ pulse [ ] No bruit [ ] No Ecchymosis  Respiratory: [ ] Clear to auscultation bilaterally  Gastrointestinal: [ ] Soft [ ] Non-tender [ ] Non-distended [ ] BS+  Musculoskeletal: [ ] No clubbing [ ] No joint deformity   Neurologic: [ ] Non-focal  Lymphatic: [ ] No lymphadenopathy  Psychiatry: [ ] AAOx3 [ ] Mood & affect appropriate  Skin: [ ] No rashes [ ] No ecchymoses [ ] No cyanosis        134<L>  |  94<L>  |  14  ----------------------------<  162<H>  4.0   |  22  |  0.70    Ca    9.2      2019 03:28  Phos  3.5     -  Mg     1.9         TPro  7.5  /  Alb  3.9  /  TBili  1.9<H>  /  DBili  x   /  AST  244<H>  /  ALT  38  /  AlkPhos  100  04-09    PT/INR - ( 2019 03:28 )   PT: 40.8 sec;   INR: 3.42 ratio         PTT - ( 2019 03:28 )  PTT:39.5 sec  CARDIAC MARKERS ( 2019 03:28 )  x     / x     / 2177 U/L / x     / 295.8 ng/mL  CARDIAC MARKERS ( 2019 22:35 )  x     / x     / 1412 U/L / x     / 186.9 ng/mL      Serum Pro-Brain Natriuretic Peptide: 1256 pg/mL ( @ 14:05)    ECG: NSR 80 biatrial enlargement. T wave flattening in I, aVL, V5-6. P wave inversion in V1, V2 w/ ANTONIA in V2.     Echo: pending    Cath: See HPI above    Imaging: < from: Xray Chest 1 View-PORTABLE IMMEDIATE (19 @ 22:12) >  Increasing bilateral airspace opacities likely   representing worsening pulmonary edema. Findings could also represent   multifocal pneumonia or ARDS.    Interpretation of Telemetry: NSR 78

## 2019-04-10 DIAGNOSIS — Z86.39 PERSONAL HISTORY OF OTHER ENDOCRINE, NUTRITIONAL AND METABOLIC DISEASE: ICD-10-CM

## 2019-04-10 DIAGNOSIS — I10 ESSENTIAL (PRIMARY) HYPERTENSION: ICD-10-CM

## 2019-04-10 DIAGNOSIS — I50.23 ACUTE ON CHRONIC SYSTOLIC (CONGESTIVE) HEART FAILURE: ICD-10-CM

## 2019-04-10 DIAGNOSIS — I51.3 INTRACARDIAC THROMBOSIS, NOT ELSEWHERE CLASSIFIED: ICD-10-CM

## 2019-04-10 DIAGNOSIS — M06.9 RHEUMATOID ARTHRITIS, UNSPECIFIED: ICD-10-CM

## 2019-04-10 DIAGNOSIS — I21.4 NON-ST ELEVATION (NSTEMI) MYOCARDIAL INFARCTION: ICD-10-CM

## 2019-04-10 LAB
ALBUMIN SERPL ELPH-MCNC: 4 G/DL — SIGNIFICANT CHANGE UP (ref 3.3–5)
ALP SERPL-CCNC: 105 U/L — SIGNIFICANT CHANGE UP (ref 40–120)
ALT FLD-CCNC: 41 U/L — SIGNIFICANT CHANGE UP (ref 10–45)
ANION GAP SERPL CALC-SCNC: 22 MMOL/L — HIGH (ref 5–17)
APTT BLD: 35.9 SEC — SIGNIFICANT CHANGE UP (ref 27.5–36.3)
APTT BLD: 45.1 SEC — HIGH (ref 27.5–36.3)
APTT BLD: 51.8 SEC — HIGH (ref 27.5–36.3)
AST SERPL-CCNC: 149 U/L — HIGH (ref 10–40)
BILIRUB SERPL-MCNC: 4.5 MG/DL — HIGH (ref 0.2–1.2)
BUN SERPL-MCNC: 25 MG/DL — HIGH (ref 7–23)
CALCIUM SERPL-MCNC: 9.4 MG/DL — SIGNIFICANT CHANGE UP (ref 8.4–10.5)
CHLORIDE SERPL-SCNC: 85 MMOL/L — LOW (ref 96–108)
CK MB BLD-MCNC: 4.9 % — HIGH (ref 0–3.5)
CK MB CFR SERPL CALC: 33.3 NG/ML — HIGH (ref 0–6.7)
CK SERPL-CCNC: 681 U/L — HIGH (ref 30–200)
CO2 SERPL-SCNC: 25 MMOL/L — SIGNIFICANT CHANGE UP (ref 22–31)
CREAT SERPL-MCNC: 0.84 MG/DL — SIGNIFICANT CHANGE UP (ref 0.5–1.3)
GLUCOSE SERPL-MCNC: 129 MG/DL — HIGH (ref 70–99)
HCT VFR BLD CALC: 53.7 % — HIGH (ref 39–50)
HCT VFR BLD CALC: 54.4 % — HIGH (ref 39–50)
HGB BLD-MCNC: 16.8 G/DL — SIGNIFICANT CHANGE UP (ref 13–17)
HGB BLD-MCNC: 16.9 G/DL — SIGNIFICANT CHANGE UP (ref 13–17)
INR BLD: 2.04 RATIO — HIGH (ref 0.88–1.16)
MAGNESIUM SERPL-MCNC: 2.2 MG/DL — SIGNIFICANT CHANGE UP (ref 1.6–2.6)
MCHC RBC-ENTMCNC: 27.2 PG — SIGNIFICANT CHANGE UP (ref 27–34)
MCHC RBC-ENTMCNC: 28.2 PG — SIGNIFICANT CHANGE UP (ref 27–34)
MCHC RBC-ENTMCNC: 30.8 GM/DL — LOW (ref 32–36)
MCHC RBC-ENTMCNC: 31.6 GM/DL — LOW (ref 32–36)
MCV RBC AUTO: 88 FL — SIGNIFICANT CHANGE UP (ref 80–100)
MCV RBC AUTO: 89.4 FL — SIGNIFICANT CHANGE UP (ref 80–100)
PHOSPHATE SERPL-MCNC: 3.5 MG/DL — SIGNIFICANT CHANGE UP (ref 2.5–4.5)
PLATELET # BLD AUTO: 260 K/UL — SIGNIFICANT CHANGE UP (ref 150–400)
PLATELET # BLD AUTO: 296 K/UL — SIGNIFICANT CHANGE UP (ref 150–400)
POTASSIUM SERPL-MCNC: 3.8 MMOL/L — SIGNIFICANT CHANGE UP (ref 3.5–5.3)
POTASSIUM SERPL-SCNC: 3.8 MMOL/L — SIGNIFICANT CHANGE UP (ref 3.5–5.3)
PROT SERPL-MCNC: 8 G/DL — SIGNIFICANT CHANGE UP (ref 6–8.3)
PROTHROM AB SERPL-ACNC: 23.8 SEC — HIGH (ref 10–12.9)
RBC # BLD: 6.01 M/UL — HIGH (ref 4.2–5.8)
RBC # BLD: 6.18 M/UL — HIGH (ref 4.2–5.8)
RBC # FLD: 13.1 % — SIGNIFICANT CHANGE UP (ref 10.3–14.5)
RBC # FLD: 13.6 % — SIGNIFICANT CHANGE UP (ref 10.3–14.5)
SODIUM SERPL-SCNC: 132 MMOL/L — LOW (ref 135–145)
TROPONIN T, HIGH SENSITIVITY RESULT: 3452 NG/L — HIGH (ref 0–51)
WBC # BLD: 11.8 K/UL — HIGH (ref 3.8–10.5)
WBC # BLD: 15.3 K/UL — HIGH (ref 3.8–10.5)
WBC # FLD AUTO: 11.8 K/UL — HIGH (ref 3.8–10.5)
WBC # FLD AUTO: 15.3 K/UL — HIGH (ref 3.8–10.5)

## 2019-04-10 PROCEDURE — 99233 SBSQ HOSP IP/OBS HIGH 50: CPT | Mod: GC

## 2019-04-10 PROCEDURE — 99233 SBSQ HOSP IP/OBS HIGH 50: CPT

## 2019-04-10 RX ORDER — ACETAMINOPHEN 500 MG
650 TABLET ORAL EVERY 6 HOURS
Qty: 0 | Refills: 0 | Status: DISCONTINUED | OUTPATIENT
Start: 2019-04-10 | End: 2019-04-14

## 2019-04-10 RX ORDER — CHLORHEXIDINE GLUCONATE 213 G/1000ML
1 SOLUTION TOPICAL
Qty: 0 | Refills: 0 | Status: DISCONTINUED | OUTPATIENT
Start: 2019-04-10 | End: 2019-04-10

## 2019-04-10 RX ORDER — HEPARIN SODIUM 5000 [USP'U]/ML
5000 INJECTION INTRAVENOUS; SUBCUTANEOUS EVERY 6 HOURS
Qty: 0 | Refills: 0 | Status: DISCONTINUED | OUTPATIENT
Start: 2019-04-10 | End: 2019-04-14

## 2019-04-10 RX ORDER — POTASSIUM CHLORIDE 20 MEQ
20 PACKET (EA) ORAL ONCE
Qty: 0 | Refills: 0 | Status: COMPLETED | OUTPATIENT
Start: 2019-04-10 | End: 2019-04-10

## 2019-04-10 RX ORDER — HEPARIN SODIUM 5000 [USP'U]/ML
INJECTION INTRAVENOUS; SUBCUTANEOUS
Qty: 25000 | Refills: 0 | Status: DISCONTINUED | OUTPATIENT
Start: 2019-04-10 | End: 2019-04-14

## 2019-04-10 RX ADMIN — HEPARIN SODIUM 1150 UNIT(S)/HR: 5000 INJECTION INTRAVENOUS; SUBCUTANEOUS at 11:34

## 2019-04-10 RX ADMIN — Medication 650 MILLIGRAM(S): at 12:29

## 2019-04-10 RX ADMIN — Medication 81 MILLIGRAM(S): at 10:30

## 2019-04-10 RX ADMIN — HEPARIN SODIUM 1000 UNIT(S)/HR: 5000 INJECTION INTRAVENOUS; SUBCUTANEOUS at 05:04

## 2019-04-10 RX ADMIN — TICAGRELOR 90 MILLIGRAM(S): 90 TABLET ORAL at 18:16

## 2019-04-10 RX ADMIN — Medication 20 MILLIEQUIVALENT(S): at 05:04

## 2019-04-10 RX ADMIN — TICAGRELOR 90 MILLIGRAM(S): 90 TABLET ORAL at 05:04

## 2019-04-10 RX ADMIN — Medication 650 MILLIGRAM(S): at 13:15

## 2019-04-10 RX ADMIN — ATORVASTATIN CALCIUM 80 MILLIGRAM(S): 80 TABLET, FILM COATED ORAL at 21:13

## 2019-04-10 RX ADMIN — BUMETANIDE 10 MG/HR: 0.25 INJECTION INTRAMUSCULAR; INTRAVENOUS at 10:31

## 2019-04-10 RX ADMIN — HEPARIN SODIUM 1300 UNIT(S)/HR: 5000 INJECTION INTRAVENOUS; SUBCUTANEOUS at 19:24

## 2019-04-10 NOTE — CHART NOTE - NSCHARTNOTEFT_GEN_A_CORE
MAR Brief Accept Note:    Hospital course:   Pt is a 64yoM PMH CHF  EF 20%, AICD [employer]  2014 , HTN, HLD, GERD, Rheumatoid arthritis, PAD, CAD/  MI w/ 9  stents, anterior STEMI c/b cardiogenic shock req restent LAD (in-stent thrombosis) and Impella (9/11/2011), hx of LV thrombus (On Coumadin) , 4/15 pt had NSTEMI with RODRIGO to pCX with Impella support admitted for NSTEMI w/ cath showing 80%osteal LAD, 100% dRCA, 50% prox circ, LVEDP 48 course c/b hypertensive emergency leading to flash pulmonary edema requiring CCU monitoring and diuresis after briefly on nitro gtt, now improved with aggressive diuresis. He is transferred out of the CCU on a Bumex gtt, breathing comfortably. Overnight prior to transfer he was started on heparin gtt for LV thrombus.     FOR FOLLOW UP:    [] f/u cards recs:  CT surgery eval vs medical optimization?  [] start captopril 6.25 mg TID if BP permits.    Adolfo Ignacio, PGY-3  [id number]

## 2019-04-10 NOTE — CHART NOTE - NSCHARTNOTEFT_GEN_A_CORE
CCU Transfer Note    Transfer from: CCU    Transfer to: (  ) Medicine  ( X ) Telemetry 3DSU 342W     (   ) RCU        (    ) Palliative         (   ) Stroke Unit       (  ) MICU   (   ) __________________    Accepting Physician:    Signout given to:     CCU COURSE:    64M w/ hx CHF  EF 20%, AICD [employer]  2014 , HTN, HLD, GERD, Rheumatoid arthritis, PAD, CAD/  MI w/ 9  stents, anterior STEMI c/b cardiogenic shock req restent LAD (in-stent thrombosis) and Impella (9/11/2011), hx of LV thrombus (On Coumadin) , 4/15 pt had NSTEMI with RODRIGO to pCX with Impella support admitted for NSTEMI w/ L. heart cath showing 80%osteal LAD, 100% dRCA, 50% prox circ, LVEDP 48 course c/b hypertensive emergency leading to flash pulmonary edema, now in acute on chronic decompensated HF. Patient was found in ADHF therefore not revascularized during cardiac catheterization. Currently he is on bumex gtt 2mg/hr, baseline SBP . On optimal medical management for NSTEMI. Holding coumadin for elevated INR, goal is to start heparin gtt once INR < 2 per ACS protocol and for LV thrombus. TTE on 4/9 showed EF 20-25% with severe LV systolic dysfunction. Pending final recommendations from interventional cardiology regarding current coronary lesions: medical optimization vs CT surgery vs high risk PCI.    PAST MEDICAL & SURGICAL HISTORY:  Rheumatoid arthritis  Systolic heart failure: EF 29% via TTE 4/15  Severe mitral regurgitation  Moderate to severe pulmonary hypertension  Left ventricular thrombus  Joint pain, foot: in toes  GERD (gastroesophageal reflux disease)  Chronic systolic congestive heart failure: EF15%-35%  PAD (peripheral artery disease)  Intermittent claudication  STEMI (ST elevation myocardial infarction): AWMI with cardiogenic shock 9/11/11- with emergent stent to LAD/impella at [facility]  H/O hyperlipidemia  HTN (hypertension)  CAD (coronary artery disease)  AICD (automatic cardioverter/defibrillator) present: EF 29%  S/P angioplasty with stent: 2008 - 5 stents [facility]  2011 - LAD stent with cardiogenic shock Impella assisted  4/2015 - pLAD RODRIGO/POBA and pCx RODRIGO x 1  Testicular torsion: bilateral testes present  Accommodative strabismus      Vital Signs Last 24 Hrs  T(C): 36.6 (10 Apr 2019 03:00), Max: 37.2 (09 Apr 2019 15:00)  T(F): 97.9 (10 Apr 2019 03:00), Max: 98.9 (09 Apr 2019 15:00)  HR: 88 (10 Apr 2019 04:00) (74 - 100)  BP: 106/63 (10 Apr 2019 04:00) (74/63 - 106/63)  BP(mean): 71 (10 Apr 2019 04:00) (54 - 77)  RR: 18 (10 Apr 2019 04:00) (16 - 27)  SpO2: 91% (10 Apr 2019 04:00) (89% - 100%)  I&O's Summary    08 Apr 2019 07:01  -  09 Apr 2019 07:00  --------------------------------------------------------  IN: 430 mL / OUT: 3050 mL / NET: -2620 mL    09 Apr 2019 07:01  -  10 Apr 2019 04:16  --------------------------------------------------------  IN: 1650 mL / OUT: 3850 mL / NET: -2200 mL      Allergies    penicillin (Rash)    Intolerances      MEDICATIONS  (STANDING):  aspirin enteric coated 81 milliGRAM(s) Oral daily  atorvastatin 80 milliGRAM(s) Oral at bedtime  buMETAnide Infusion 2 mG/Hr (10 mL/Hr) IV Continuous <Continuous>  chlorhexidine 4% Liquid 1 Application(s) Topical <User Schedule>  ticagrelor 90 milliGRAM(s) Oral two times a day    MEDICATIONS  (PRN):  aluminum hydroxide/magnesium hydroxide/simethicone Suspension 30 milliLiter(s) Oral every 6 hours PRN Dyspepsia      CARDIAC MARKERS ( 09 Apr 2019 12:00 )  x     / x     / 1749 U/L / x     / 187.2 ng/mL  CARDIAC MARKERS ( 09 Apr 2019 03:28 )  x     / x     / 2177 U/L / x     / 295.8 ng/mL  CARDIAC MARKERS ( 08 Apr 2019 22:35 )  x     / x     / 1412 U/L / x     / 186.9 ng/mL                 16.9   11.8  )-----------( 260      ( 10 Apr 2019 03:32 )             53.7     04-09    132<L>  |  87<L>  |  24<H>  ----------------------------<  113<H>  3.8   |  27  |  1.05    Ca    9.5      09 Apr 2019 20:39  Phos  3.5     04-09  Mg     2.2     04-09    TPro  8.3  /  Alb  4.4  /  TBili  3.5<H>  /  DBili  x   /  AST  194<H>  /  ALT  45  /  AlkPhos  111  04-09    PT/INR - ( 10 Apr 2019 03:32 )   PT: 23.8 sec;   INR: 2.04 ratio         PTT - ( 10 Apr 2019 03:32 )  PTT:35.9 sec  PHYSICAL EXAM:    General: no respiratory distress  Neurology: A&Ox3, nonfocal, CROWELL x 4  Eyes: PERRLA/ EOMI, Gross vision intact  ENT/Neck: Neck supple, trachea midline, No JVD, Gross hearing intact  Respiratory: CTABL  CV: RRR, S1S2, no murmurs, rubs or gallops  Abdominal: Soft, NT, ND +BS,   Extremities: No edema, + peripheral pulses  Skin: No Rashes, Hematoma, Ecchymosis      ABG - ( 08 Apr 2019 22:40 )  pH, Arterial: 7.30  pH, Blood: x     /  pCO2: 47    /  pO2: 48    / HCO3: 22    / Base Excess: -4.1  /  SaO2: 78            ASSESSMENT & PLAN:   64M w/ hx CHF  EF 20%, AICD [employer]  2014 , HTN, HLD, GERD, Rheumatoid arthritis, PAD, CAD/  MI w/ 9  stents, anterior STEMI c/b cardiogenic shock req restent LAD (in-stent thrombosis) and Impella (9/11/2011), hx of LV thrombus (On Coumadin) , 4/15 pt had NSTEMI with RODRIGO to pCX with Impella support admitted for NSTEMI w/ cath showing 80%osteal LAD, 100% dRCA, 50% prox circ, LVEDP 48 course c/b hypertensive emergency leading to flash pulmonary edema, now in acute on chronic decompensated HF.    Neuro:  AAO x 4      CV:  NSTEMI with coronary angiogram that showed 80%osteal LAD, 100% dRCA, 50% prox circ, LVEDP 48  s/p asa and brillinta load, c/w asa 81mg and brilinta 90 mg BID  c/w atorvastatin  - start heparin gtt after INR < 2  - cont to trend cardiac enzymes until peaked  - pending final recommendation from interventional cardiologist regarding management of the lesions : CT surgery eval vs medical optimization    acute on chronic decompensated HF secondary ischemic cardiomyopathy  - c/w bumex 2mg/hr gtt, last known EF 23% in 11/2018 with severe segmental LV dysfunction, elevated pro-BNP  - s/p lasix 80 IVP x 1 and bumex 2mg IVP x1  -  hold beta blocker i/s/o ADHF  - TTE 4/9 showed EF 20-25% severe LV dysfunction  - strict I&O, daily standing weights  - c/w bipap PRN for respiratory distress    hx of LV thrombus  - prior echo was negative for LV thrombus, pt mentioned has "remanent" LV thrombus  - currently inr supratherapeutic, hold coumadin      Pulm:  Acute respiratory distress in setting of pulmonary vascular congestion  - now resolved on 2L NC    GI:  DASH diet  RUQ US showed multiple hepatic cyst  T bili rising most likely due to hepatic congestion    Endo:  No issues    Renal:  UOP improved after bumex gtt    DVT ppx:  hold i/s/o supratherapeutic INR    Ariadne Reddy PGY-2  Pager # [phone]/ [phone]      FOR FOLLOW UP:  [] start heparin gtt once INR <2 (check INR q8h)  [] f/u cards recs  [] start captopril 6.25 mg TID if BP permits CCU Transfer Note    Transfer from: CCU    Transfer to: (  ) Medicine  ( X ) Telemetry 3DSU 342W     (   ) RCU        (    ) Palliative         (   ) Stroke Unit       (  ) MICU   (   ) __________________    Accepting Physician:    Signout given to:     CCU COURSE:    64M w/ hx CHF  EF 20%, AICD [employer]  2014 , HTN, HLD, GERD, Rheumatoid arthritis, PAD, CAD/  MI w/ 9  stents, anterior STEMI c/b cardiogenic shock req restent LAD (in-stent thrombosis) and Impella (9/11/2011), hx of LV thrombus (On Coumadin) , 4/15 pt had NSTEMI with RODRIGO to pCX with Impella support admitted for NSTEMI w/ L. heart cath showing 80%osteal LAD, 100% dRCA, 50% prox circ, LVEDP 48 course c/b hypertensive emergency leading to flash pulmonary edema, now in acute on chronic decompensated HF. Patient was found in ADHF therefore not revascularized during cardiac catheterization. Currently he is on bumex gtt 2mg/hr, baseline SBP . On optimal medical management for NSTEMI. Holding coumadin for elevated INR, goal is to start heparin gtt once INR < 2 per ACS protocol and for LV thrombus. TTE on 4/9 showed EF 20-25% with severe LV systolic dysfunction. Pending final recommendations from interventional cardiology regarding current coronary lesions: medical optimization vs CT surgery vs high risk PCI.    PAST MEDICAL & SURGICAL HISTORY:  Rheumatoid arthritis  Systolic heart failure: EF 29% via TTE 4/15  Severe mitral regurgitation  Moderate to severe pulmonary hypertension  Left ventricular thrombus  Joint pain, foot: in toes  GERD (gastroesophageal reflux disease)  Chronic systolic congestive heart failure: EF15%-35%  PAD (peripheral artery disease)  Intermittent claudication  STEMI (ST elevation myocardial infarction): AWMI with cardiogenic shock 9/11/11- with emergent stent to LAD/impella at [facility]  H/O hyperlipidemia  HTN (hypertension)  CAD (coronary artery disease)  AICD (automatic cardioverter/defibrillator) present: EF 29%  S/P angioplasty with stent: 2008 - 5 stents [facility]  2011 - LAD stent with cardiogenic shock Impella assisted  4/2015 - pLAD RODRIGO/POBA and pCx RODRIGO x 1  Testicular torsion: bilateral testes present  Accommodative strabismus      Vital Signs Last 24 Hrs  T(C): 36.6 (10 Apr 2019 03:00), Max: 37.2 (09 Apr 2019 15:00)  T(F): 97.9 (10 Apr 2019 03:00), Max: 98.9 (09 Apr 2019 15:00)  HR: 88 (10 Apr 2019 04:00) (74 - 100)  BP: 106/63 (10 Apr 2019 04:00) (74/63 - 106/63)  BP(mean): 71 (10 Apr 2019 04:00) (54 - 77)  RR: 18 (10 Apr 2019 04:00) (16 - 27)  SpO2: 91% (10 Apr 2019 04:00) (89% - 100%)  I&O's Summary    08 Apr 2019 07:01  -  09 Apr 2019 07:00  --------------------------------------------------------  IN: 430 mL / OUT: 3050 mL / NET: -2620 mL    09 Apr 2019 07:01  -  10 Apr 2019 04:16  --------------------------------------------------------  IN: 1650 mL / OUT: 3850 mL / NET: -2200 mL      Allergies    penicillin (Rash)    Intolerances      MEDICATIONS  (STANDING):  aspirin enteric coated 81 milliGRAM(s) Oral daily  atorvastatin 80 milliGRAM(s) Oral at bedtime  buMETAnide Infusion 2 mG/Hr (10 mL/Hr) IV Continuous <Continuous>  chlorhexidine 4% Liquid 1 Application(s) Topical <User Schedule>  ticagrelor 90 milliGRAM(s) Oral two times a day    MEDICATIONS  (PRN):  aluminum hydroxide/magnesium hydroxide/simethicone Suspension 30 milliLiter(s) Oral every 6 hours PRN Dyspepsia      CARDIAC MARKERS ( 09 Apr 2019 12:00 )  x     / x     / 1749 U/L / x     / 187.2 ng/mL  CARDIAC MARKERS ( 09 Apr 2019 03:28 )  x     / x     / 2177 U/L / x     / 295.8 ng/mL  CARDIAC MARKERS ( 08 Apr 2019 22:35 )  x     / x     / 1412 U/L / x     / 186.9 ng/mL                 16.9   11.8  )-----------( 260      ( 10 Apr 2019 03:32 )             53.7     04-09    132<L>  |  87<L>  |  24<H>  ----------------------------<  113<H>  3.8   |  27  |  1.05    Ca    9.5      09 Apr 2019 20:39  Phos  3.5     04-09  Mg     2.2     04-09    TPro  8.3  /  Alb  4.4  /  TBili  3.5<H>  /  DBili  x   /  AST  194<H>  /  ALT  45  /  AlkPhos  111  04-09    PT/INR - ( 10 Apr 2019 03:32 )   PT: 23.8 sec;   INR: 2.04 ratio         PTT - ( 10 Apr 2019 03:32 )  PTT:35.9 sec  PHYSICAL EXAM:    General: no respiratory distress  Neurology: A&Ox3, nonfocal, CROWELL x 4  Eyes: PERRLA/ EOMI, Gross vision intact  ENT/Neck: Neck supple, trachea midline, No JVD, Gross hearing intact  Respiratory: CTABL  CV: RRR, S1S2, no murmurs, rubs or gallops  Abdominal: Soft, NT, ND +BS,   Extremities: No edema, + peripheral pulses  Skin: No Rashes, Hematoma, Ecchymosis      ABG - ( 08 Apr 2019 22:40 )  pH, Arterial: 7.30  pH, Blood: x     /  pCO2: 47    /  pO2: 48    / HCO3: 22    / Base Excess: -4.1  /  SaO2: 78            ASSESSMENT & PLAN:   64M w/ hx CHF  EF 20%, AICD [employer]  2014 , HTN, HLD, GERD, Rheumatoid arthritis, PAD, CAD/  MI w/ 9  stents, anterior STEMI c/b cardiogenic shock req restent LAD (in-stent thrombosis) and Impella (9/11/2011), hx of LV thrombus (On Coumadin) , 4/15 pt had NSTEMI with RODRIGO to pCX with Impella support admitted for NSTEMI w/ cath showing 80%osteal LAD, 100% dRCA, 50% prox circ, LVEDP 48 course c/b hypertensive emergency leading to flash pulmonary edema, now in acute on chronic decompensated HF.    Neuro:  AAO x 4      CV:  NSTEMI with coronary angiogram that showed 80%osteal LAD, 100% dRCA, 50% prox circ, LVEDP 48  s/p asa and brillinta load, c/w asa 81mg and brilinta 90 mg BID  c/w atorvastatin  - started on heparin gtt as INR 2.04  - cont to trend cardiac enzymes q6h until peaked  - pending final recommendation from interventional cardiologist regarding management of the lesions : CT surgery eval vs medical optimization    acute on chronic decompensated HF secondary ischemic cardiomyopathy  - c/w bumex 2mg/hr gtt, last known EF 23% in 11/2018 with severe segmental LV dysfunction, elevated pro-BNP  - s/p lasix 80 IVP x 1 and bumex 2mg IVP x1  -  hold beta blocker i/s/o ADHF  - TTE 4/9 showed EF 20-25% severe LV dysfunction  - strict I&O, daily standing weights      hx of LV thrombus  - prior echo was negative for LV thrombus, pt mentioned has "remanent" LV thrombus  - on heparin gtt      Pulm:  Acute respiratory distress in setting of pulmonary vascular congestion  - now resolved on 2L NC    GI:  DASH diet  RUQ US showed multiple hepatic cyst  T bili rising most likely due to hepatic congestion    Endo:  No issues    Renal:  UOP improved after bumex gtt    DVT ppx:  hold i/s/o supratherapeutic INR    Ariadne Reddy PGY-2  Pager # [phone]/ [phone]      FOR FOLLOW UP:    [] f/u cards recs  [] start captopril 6.25 mg TID if BP permits

## 2019-04-10 NOTE — PROGRESS NOTE ADULT - SUBJECTIVE AND OBJECTIVE BOX
Patient is a 64y old  Male who presents with a chief complaint of NSTEMI , AdHF (09 Apr 2019 07:50)      SUBJECTIVE / OVERNIGHT EVENTS: 64M w/ hx CHF  EF 20%, AICD [employer]  2014 , HTN, HLD, GERD, Rheumatoid arthritis, PAD, CAD/  MI w/ 9  stents, anterior STEMI c/b cardiogenic shock req restent LAD (in-stent thrombosis) and Impella (9/11/2011), hx of LV thrombus (On Coumadin) , 4/15 pt had NSTEMI with RODRIGO to pCX with Impella support admitted for NSTEMI w/ L. heart cath showing 80%osteal LAD, 100% dRCA, 50% prox circ, LVEDP 48 course c/b hypertensive emergency leading to flash pulmonary edema, with acute on chronic decompensated HF. Patient was found in ADHF therefore not revascularized during cardiac catheterization. Currently he is on bumex gtt, baseline SBP . On optimal medical management for NSTEMI. Holding coumadin for elevated INR, started on heparin gtt for LV thrombus. TTE on 4/9 showed EF 20-25% with severe LV systolic dysfunction. Pending final recommendations from interventional cardiology regarding current coronary lesions: medical optimization vs CT surgery vs high risk PCI. Currently feeling better. Complains of worsening arthritis symptoms in hands.     All other review of systems negative    MEDICATIONS  (STANDING):  aspirin enteric coated 81 milliGRAM(s) Oral daily  atorvastatin 80 milliGRAM(s) Oral at bedtime  buMETAnide Infusion 2 mG/Hr (10 mL/Hr) IV Continuous <Continuous>  heparin  Infusion.  Unit(s)/Hr (10 mL/Hr) IV Continuous <Continuous>  ticagrelor 90 milliGRAM(s) Oral two times a day    MEDICATIONS  (PRN):  acetaminophen   Tablet .. 650 milliGRAM(s) Oral every 6 hours PRN Mild Pain (1 - 3), Moderate Pain (4 - 6), Severe Pain (7 - 10)  aluminum hydroxide/magnesium hydroxide/simethicone Suspension 30 milliLiter(s) Oral every 6 hours PRN Dyspepsia  heparin  Injectable 5000 Unit(s) IV Push every 6 hours PRN For aPTT less than 40      Vital Signs Last 24 Hrs  T(C): 36.5 (10 Apr 2019 12:53), Max: 37 (09 Apr 2019 20:00)  T(F): 97.7 (10 Apr 2019 12:53), Max: 98.6 (09 Apr 2019 20:00)  HR: 99 (10 Apr 2019 12:53) (80 - 100)  BP: 85/64 (10 Apr 2019 12:53) (74/63 - 106/63)  BP(mean): 73 (10 Apr 2019 05:00) (54 - 77)  RR: 18 (10 Apr 2019 12:53) (16 - 22)  SpO2: 95% (10 Apr 2019 12:53) (89% - 95%)  CAPILLARY BLOOD GLUCOSE        I&O's Summary    09 Apr 2019 07:01  -  10 Apr 2019 07:00  --------------------------------------------------------  IN: 1920 mL / OUT: 3850 mL / NET: -1930 mL    10 Apr 2019 07:01  -  10 Apr 2019 15:56  --------------------------------------------------------  IN: 220 mL / OUT: 800 mL / NET: -580 mL        TELEMETRY:    PHYSICAL EXAM:  GENERAL: NAD, well-developed  EYES: EOMI, conjunctiva and sclera clear  NECK: Supple  CHEST/LUNG: +basilar rales, full excursion, nonlabored  HEART: Regular rate and rhythm; No murmurs, rubs, or gallops  ABDOMEN: Soft, Nontender, Nondistended; Bowel sounds present  EXTREMITIES:  No clubbing, cyanosis, or edema  PSYCH: AAOx3, normal affect  NEUROLOGY: non-focal; no gross sensory deficits; muscle strength 5/5 b/l UE and LE  SKIN: warm and dry    LABS:                        16.8   15.3  )-----------( 296      ( 10 Apr 2019 11:01 )             54.4     04-10    132<L>  |  85<L>  |  25<H>  ----------------------------<  129<H>  3.8   |  25  |  0.84    Ca    9.4      10 Apr 2019 03:37  Phos  3.5     04-10  Mg     2.2     04-10    TPro  8.0  /  Alb  4.0  /  TBili  4.5<H>  /  DBili  x   /  AST  149<H>  /  ALT  41  /  AlkPhos  105  04-10    LIVER FUNCTIONS - ( 10 Apr 2019 03:37 )  Alb: 4.0 g/dL / Pro: 8.0 g/dL / ALK PHOS: 105 U/L / ALT: 41 U/L / AST: 149 U/L / GGT: x           PT/INR - ( 10 Apr 2019 03:32 )   PT: 23.8 sec;   INR: 2.04 ratio         PTT - ( 10 Apr 2019 11:01 )  PTT:45.1 sec  CARDIAC MARKERS ( 10 Apr 2019 03:37 )  x     / x     / 681 U/L / x     / 33.3 ng/mL  CARDIAC MARKERS ( 09 Apr 2019 12:00 )  x     / x     / 1749 U/L / x     / 187.2 ng/mL  CARDIAC MARKERS ( 09 Apr 2019 03:28 )  x     / x     / 2177 U/L / x     / 295.8 ng/mL  CARDIAC MARKERS ( 08 Apr 2019 22:35 )  x     / x     / 1412 U/L / x     / 186.9 ng/mL          RADIOLOGY & ADDITIONAL TESTS:    Imaging Personally Reviewed:    Consultant(s) Notes Reviewed:      Care Discussed with Consultants/Other Providers:    Contact Number: [facility] [phone]

## 2019-04-10 NOTE — PROGRESS NOTE ADULT - PROBLEM SELECTOR PLAN 1
- continue ASA 81mg daily, Brilinta 90mg BID, atorvastatin 80mg qhs  - no afterload reduction at this time given hypotension  - trend Tiffanie to peak  -Cardiac cath once euvolemic.

## 2019-04-11 DIAGNOSIS — E80.6 OTHER DISORDERS OF BILIRUBIN METABOLISM: ICD-10-CM

## 2019-04-11 LAB
ALBUMIN SERPL ELPH-MCNC: 4.5 G/DL — SIGNIFICANT CHANGE UP (ref 3.3–5)
ALP SERPL-CCNC: 117 U/L — SIGNIFICANT CHANGE UP (ref 40–120)
ALT FLD-CCNC: 35 U/L — SIGNIFICANT CHANGE UP (ref 10–45)
ANION GAP SERPL CALC-SCNC: 21 MMOL/L — HIGH (ref 5–17)
APTT BLD: 40.9 SEC — HIGH (ref 27.5–36.3)
APTT BLD: 58 SEC — HIGH (ref 27.5–36.3)
APTT BLD: 73.5 SEC — HIGH (ref 27.5–36.3)
AST SERPL-CCNC: 73 U/L — HIGH (ref 10–40)
BASE EXCESS BLDV CALC-SCNC: 9.6 MMOL/L — HIGH (ref -2–2)
BILIRUB DIRECT SERPL-MCNC: 0.3 MG/DL — HIGH (ref 0–0.2)
BILIRUB INDIRECT FLD-MCNC: 2.8 MG/DL — HIGH (ref 0.2–1)
BILIRUB SERPL-MCNC: 3.1 MG/DL — HIGH (ref 0.2–1.2)
BILIRUB SERPL-MCNC: 5 MG/DL — HIGH (ref 0.2–1.2)
BUN SERPL-MCNC: 28 MG/DL — HIGH (ref 7–23)
CA-I SERPL-SCNC: 1.08 MMOL/L — LOW (ref 1.12–1.3)
CALCIUM SERPL-MCNC: 10.1 MG/DL — SIGNIFICANT CHANGE UP (ref 8.4–10.5)
CHLORIDE BLDV-SCNC: 86 MMOL/L — LOW (ref 96–108)
CHLORIDE SERPL-SCNC: 81 MMOL/L — LOW (ref 96–108)
CO2 BLDV-SCNC: 38 MMOL/L — HIGH (ref 22–30)
CO2 SERPL-SCNC: 31 MMOL/L — SIGNIFICANT CHANGE UP (ref 22–31)
CREAT SERPL-MCNC: 0.9 MG/DL — SIGNIFICANT CHANGE UP (ref 0.5–1.3)
GAS PNL BLDV: 130 MMOL/L — LOW (ref 136–145)
GAS PNL BLDV: SIGNIFICANT CHANGE UP
GAS PNL BLDV: SIGNIFICANT CHANGE UP
GLUCOSE BLDV-MCNC: 121 MG/DL — HIGH (ref 70–99)
GLUCOSE SERPL-MCNC: 116 MG/DL — HIGH (ref 70–99)
HCO3 BLDV-SCNC: 36 MMOL/L — HIGH (ref 21–29)
HCT VFR BLD CALC: 54.6 % — HIGH (ref 39–50)
HCT VFR BLDA CALC: 54 % — HIGH (ref 39–50)
HGB BLD CALC-MCNC: 17.8 G/DL — HIGH (ref 13–17)
HGB BLD-MCNC: 17.6 G/DL — HIGH (ref 13–17)
LACTATE BLDV-MCNC: 2.5 MMOL/L — HIGH (ref 0.7–2)
LACTATE BLDV-MCNC: 2.6 MMOL/L — HIGH (ref 0.7–2)
MAGNESIUM SERPL-MCNC: 2.3 MG/DL — SIGNIFICANT CHANGE UP (ref 1.6–2.6)
MCHC RBC-ENTMCNC: 28.9 PG — SIGNIFICANT CHANGE UP (ref 27–34)
MCHC RBC-ENTMCNC: 32.2 GM/DL — SIGNIFICANT CHANGE UP (ref 32–36)
MCV RBC AUTO: 89.9 FL — SIGNIFICANT CHANGE UP (ref 80–100)
OTHER CELLS CSF MANUAL: 16 ML/DL — LOW (ref 18–22)
PCO2 BLDV: 54 MMHG — HIGH (ref 35–50)
PH BLDV: 7.44 — SIGNIFICANT CHANGE UP (ref 7.35–7.45)
PHOSPHATE SERPL-MCNC: 4.2 MG/DL — SIGNIFICANT CHANGE UP (ref 2.5–4.5)
PLATELET # BLD AUTO: 256 K/UL — SIGNIFICANT CHANGE UP (ref 150–400)
PO2 BLDV: 37 MMHG — SIGNIFICANT CHANGE UP (ref 25–45)
POTASSIUM BLDV-SCNC: 3 MMOL/L — LOW (ref 3.5–5.3)
POTASSIUM SERPL-MCNC: 3.2 MMOL/L — LOW (ref 3.5–5.3)
POTASSIUM SERPL-SCNC: 3.2 MMOL/L — LOW (ref 3.5–5.3)
PROT SERPL-MCNC: 8.8 G/DL — HIGH (ref 6–8.3)
RBC # BLD: 6.08 M/UL — HIGH (ref 4.2–5.8)
RBC # FLD: 13.5 % — SIGNIFICANT CHANGE UP (ref 10.3–14.5)
SAO2 % BLDV: 67 % — SIGNIFICANT CHANGE UP (ref 67–88)
SODIUM SERPL-SCNC: 133 MMOL/L — LOW (ref 135–145)
WBC # BLD: 13.3 K/UL — HIGH (ref 3.8–10.5)
WBC # FLD AUTO: 13.3 K/UL — HIGH (ref 3.8–10.5)

## 2019-04-11 PROCEDURE — 99233 SBSQ HOSP IP/OBS HIGH 50: CPT

## 2019-04-11 RX ORDER — POTASSIUM CHLORIDE 20 MEQ
40 PACKET (EA) ORAL EVERY 4 HOURS
Qty: 0 | Refills: 0 | Status: COMPLETED | OUTPATIENT
Start: 2019-04-11 | End: 2019-04-11

## 2019-04-11 RX ADMIN — BUMETANIDE 10 MG/HR: 0.25 INJECTION INTRAMUSCULAR; INTRAVENOUS at 21:55

## 2019-04-11 RX ADMIN — Medication 40 MILLIEQUIVALENT(S): at 17:53

## 2019-04-11 RX ADMIN — BUMETANIDE 10 MG/HR: 0.25 INJECTION INTRAMUSCULAR; INTRAVENOUS at 02:12

## 2019-04-11 RX ADMIN — BUMETANIDE 10 MG/HR: 0.25 INJECTION INTRAMUSCULAR; INTRAVENOUS at 11:02

## 2019-04-11 RX ADMIN — Medication 81 MILLIGRAM(S): at 11:03

## 2019-04-11 RX ADMIN — HEPARIN SODIUM 1450 UNIT(S)/HR: 5000 INJECTION INTRAVENOUS; SUBCUTANEOUS at 11:02

## 2019-04-11 RX ADMIN — HEPARIN SODIUM 1300 UNIT(S)/HR: 5000 INJECTION INTRAVENOUS; SUBCUTANEOUS at 02:13

## 2019-04-11 RX ADMIN — Medication 40 MILLIEQUIVALENT(S): at 21:55

## 2019-04-11 RX ADMIN — ATORVASTATIN CALCIUM 80 MILLIGRAM(S): 80 TABLET, FILM COATED ORAL at 21:55

## 2019-04-11 RX ADMIN — TICAGRELOR 90 MILLIGRAM(S): 90 TABLET ORAL at 17:53

## 2019-04-11 RX ADMIN — TICAGRELOR 90 MILLIGRAM(S): 90 TABLET ORAL at 05:38

## 2019-04-11 RX ADMIN — HEPARIN SODIUM 1350 UNIT(S)/HR: 5000 INJECTION INTRAVENOUS; SUBCUTANEOUS at 18:36

## 2019-04-11 NOTE — PROGRESS NOTE ADULT - SUBJECTIVE AND OBJECTIVE BOX
Patient is a 64y old  Male who presents with a chief complaint of CP/SOB (11 Apr 2019 02:05)        SUBJECTIVE / OVERNIGHT EVENTS: no acute complaints      MEDICATIONS  (STANDING):  aspirin enteric coated 81 milliGRAM(s) Oral daily  atorvastatin 80 milliGRAM(s) Oral at bedtime  buMETAnide Infusion 2 mG/Hr (10 mL/Hr) IV Continuous <Continuous>  heparin  Infusion.  Unit(s)/Hr (10 mL/Hr) IV Continuous <Continuous>  potassium chloride    Tablet ER 40 milliEquivalent(s) Oral every 4 hours  ticagrelor 90 milliGRAM(s) Oral two times a day    MEDICATIONS  (PRN):  acetaminophen   Tablet .. 650 milliGRAM(s) Oral every 6 hours PRN Mild Pain (1 - 3), Moderate Pain (4 - 6), Severe Pain (7 - 10)  aluminum hydroxide/magnesium hydroxide/simethicone Suspension 30 milliLiter(s) Oral every 6 hours PRN Dyspepsia  heparin  Injectable 5000 Unit(s) IV Push every 6 hours PRN For aPTT less than 40      Vital Signs Last 24 Hrs  T(C): 36.6 (11 Apr 2019 10:15), Max: 36.8 (10 Apr 2019 21:13)  T(F): 97.9 (11 Apr 2019 10:15), Max: 98.3 (10 Apr 2019 21:13)  HR: 95 (11 Apr 2019 10:15) (91 - 98)  BP: 94/65 (11 Apr 2019 10:15) (83/57 - 100/74)  BP(mean): --  RR: 19 (11 Apr 2019 10:15) (18 - 19)  SpO2: 95% (11 Apr 2019 10:15) (93% - 95%)  CAPILLARY BLOOD GLUCOSE        I&O's Summary    10 Apr 2019 07:01  -  11 Apr 2019 07:00  --------------------------------------------------------  IN: 981.8 mL / OUT: 2500 mL / NET: -1518.2 mL    11 Apr 2019 07:01  -  11 Apr 2019 17:19  --------------------------------------------------------  IN: 420 mL / OUT: 800 mL / NET: -380 mL        PHYSICAL EXAM:  GENERAL: NAD  HEAD:  Atraumatic, Normocephalic  EYES: conjunctiva and sclera clear  NECK: No JVD  CHEST/LUNG: CTA b/l  HEART: S1 S2 RRR  ABDOMEN: +BS Soft, NT/ND  EXTREMITIES:  2+ DP Pulses, No c/c/e  NEUROLOGY: AAOx3, no focal deficits   SKIN: No rashes or lesions    LABS:                        17.6   13.3  )-----------( 256      ( 11 Apr 2019 06:49 )             54.6     04-11    133<L>  |  81<L>  |  28<H>  ----------------------------<  116<H>  3.2<L>   |  31  |  0.90    Ca    10.1      11 Apr 2019 06:49  Phos  4.2     04-11  Mg     2.3     04-11    TPro  8.8<H>  /  Alb  4.5  /  TBili  5.0<H>  /  DBili  x   /  AST  73<H>  /  ALT  35  /  AlkPhos  117  04-11    PT/INR - ( 10 Apr 2019 03:32 )   PT: 23.8 sec;   INR: 2.04 ratio         PTT - ( 11 Apr 2019 09:33 )  PTT:40.9 sec  CARDIAC MARKERS ( 10 Apr 2019 03:37 )  x     / x     / 681 U/L / x     / 33.3 ng/mL          RADIOLOGY & ADDITIONAL TESTS:    Imaging Personally Reviewed:  Consultant(s) Notes Reviewed:    Care Discussed with Consultants/Other Providers: Patient is a 64y old  Male who presents with a chief complaint of CP/SOB (11 Apr 2019 02:05)        SUBJECTIVE / OVERNIGHT EVENTS: c/o decreased po intake. no abdominal pain, n, v. patient states some sob ?improved since yesturday. no chest pain.       MEDICATIONS  (STANDING):  aspirin enteric coated 81 milliGRAM(s) Oral daily  atorvastatin 80 milliGRAM(s) Oral at bedtime  buMETAnide Infusion 2 mG/Hr (10 mL/Hr) IV Continuous <Continuous>  heparin  Infusion.  Unit(s)/Hr (10 mL/Hr) IV Continuous <Continuous>  potassium chloride    Tablet ER 40 milliEquivalent(s) Oral every 4 hours  ticagrelor 90 milliGRAM(s) Oral two times a day    MEDICATIONS  (PRN):  acetaminophen   Tablet .. 650 milliGRAM(s) Oral every 6 hours PRN Mild Pain (1 - 3), Moderate Pain (4 - 6), Severe Pain (7 - 10)  aluminum hydroxide/magnesium hydroxide/simethicone Suspension 30 milliLiter(s) Oral every 6 hours PRN Dyspepsia  heparin  Injectable 5000 Unit(s) IV Push every 6 hours PRN For aPTT less than 40      Vital Signs Last 24 Hrs  T(C): 36.6 (11 Apr 2019 10:15), Max: 36.8 (10 Apr 2019 21:13)  T(F): 97.9 (11 Apr 2019 10:15), Max: 98.3 (10 Apr 2019 21:13)  HR: 95 (11 Apr 2019 10:15) (91 - 98)  BP: 94/65 (11 Apr 2019 10:15) (83/57 - 100/74)  BP(mean): --  RR: 19 (11 Apr 2019 10:15) (18 - 19)  SpO2: 95% (11 Apr 2019 10:15) (93% - 95%)  CAPILLARY BLOOD GLUCOSE        I&O's Summary    10 Apr 2019 07:01  -  11 Apr 2019 07:00  --------------------------------------------------------  IN: 981.8 mL / OUT: 2500 mL / NET: -1518.2 mL    11 Apr 2019 07:01  -  11 Apr 2019 17:19  --------------------------------------------------------  IN: 420 mL / OUT: 800 mL / NET: -380 mL        PHYSICAL EXAM:  GENERAL: NAD, well-developed  EYES: EOMI, conjunctiva and sclera clear  NECK: Supple  CHEST/LUNG: +basilar rales, full excursion, nonlabored  HEART: Regular rate and rhythm; No murmurs, rubs, or gallops  ABDOMEN: Soft, Nontender, Nondistended; Bowel sounds present  EXTREMITIES:  No clubbing, cyanosis, or edema  PSYCH: AAOx3, normal affect  NEUROLOGY: non-focal; no gross sensory deficits; muscle strength 5/5 b/l UE and LE  SKIN: warm and dry    LABS:                        17.6   13.3  )-----------( 256      ( 11 Apr 2019 06:49 )             54.6     04-11    133<L>  |  81<L>  |  28<H>  ----------------------------<  116<H>  3.2<L>   |  31  |  0.90    Ca    10.1      11 Apr 2019 06:49  Phos  4.2     04-11  Mg     2.3     04-11    TPro  8.8<H>  /  Alb  4.5  /  TBili  5.0<H>  /  DBili  x   /  AST  73<H>  /  ALT  35  /  AlkPhos  117  04-11    PT/INR - ( 10 Apr 2019 03:32 )   PT: 23.8 sec;   INR: 2.04 ratio         PTT - ( 11 Apr 2019 09:33 )  PTT:40.9 sec  CARDIAC MARKERS ( 10 Apr 2019 03:37 )  x     / x     / 681 U/L / x     / 33.3 ng/mL          RADIOLOGY & ADDITIONAL TESTS:    Imaging Personally Reviewed:  Consultant(s) Notes Reviewed:    Care Discussed with Consultants/Other Providers:

## 2019-04-11 NOTE — PROGRESS NOTE ADULT - PROBLEM SELECTOR PLAN 2
- continue Bumex gtt  -Daily weight  -Strict I's and O's  -ICD interrogated. - continue Bumex gtt  -Daily weight  -Strict I's and O's  -ICD interrogated.  on digoxin at home - ?restart - cards f/u

## 2019-04-11 NOTE — PROGRESS NOTE ADULT - PROBLEM SELECTOR PLAN 4
c/w statin unclear etiology - no abdominal tenderness on exam  alk phos is wnl but increasing   will check breakdown of bilirubin, ldh and haptoglobin  if alk phos continues to rise, will check US abd

## 2019-04-11 NOTE — PROGRESS NOTE ADULT - PROBLEM SELECTOR PROBLEM 6
Rheumatoid arthritis involving both hands, unspecified rheumatoid factor presence Left ventricular thrombus

## 2019-04-11 NOTE — PROGRESS NOTE ADULT - PROBLEM SELECTOR PLAN 1
- continue ASA 81mg daily, Brilinta 90mg BID, atorvastatin 80mg qhs  - no afterload reduction at this time given hypotension  - trend Tiffanie to peak  -Cardiac cath once euvolemic. - continue ASA 81mg daily, Brilinta 90mg BID, atorvastatin 80mg qhs  - no afterload reduction at this time given hypotension  - trend Tiffanie to peak  -Cardiac cath for PCI once euvolemic.

## 2019-04-11 NOTE — PROGRESS NOTE ADULT - SUBJECTIVE AND OBJECTIVE BOX
SUBJ:  No acute events. Improving shortness of breath, denies chest pain, lower extremity edema. Optimizing for cardiac cath.     MEDICATIONS  (STANDING):  aspirin enteric coated 81 milliGRAM(s) Oral daily  atorvastatin 80 milliGRAM(s) Oral at bedtime  buMETAnide Infusion 2 mG/Hr (10 mL/Hr) IV Continuous <Continuous>  heparin  Infusion.  Unit(s)/Hr (10 mL/Hr) IV Continuous <Continuous>  ticagrelor 90 milliGRAM(s) Oral two times a day    MEDICATIONS  (PRN):  acetaminophen   Tablet .. 650 milliGRAM(s) Oral every 6 hours PRN Mild Pain (1 - 3), Moderate Pain (4 - 6), Severe Pain (7 - 10)  aluminum hydroxide/magnesium hydroxide/simethicone Suspension 30 milliLiter(s) Oral every 6 hours PRN Dyspepsia  heparin  Injectable 5000 Unit(s) IV Push every 6 hours PRN For aPTT less than 40    Vital Signs Last 24 Hrs  T(C): 36.8 (10 Apr 2019 21:13), Max: 36.8 (10 Apr 2019 21:13)  T(F): 98.3 (10 Apr 2019 21:13), Max: 98.3 (10 Apr 2019 21:13)  HR: 97 (10 Apr 2019 21:13) (82 - 99)  BP: 83/57 (10 Apr 2019 21:13) (76/62 - 106/63)  BP(mean): 73 (10 Apr 2019 05:00) (54 - 76)  RR: 18 (10 Apr 2019 21:13) (16 - 18)  SpO2: 93% (10 Apr 2019 21:13) (89% - 95%)    REVIEW OF SYSTEMS:  As per HPI, otherwise unremarkable.     PHYSICAL EXAM:  Appearance: Normal	  HEENT:   Normal oral mucosa  Lymphatic: No lymphadenopathy  Cardiovascular: Normal S1 S2, No murmurs, Positive trace-1+ edema  Respiratory: Lungs clear to auscultation  Psychiatry: A & O x 3  Gastrointestinal:  Soft, Non-tender  Skin: No rashes, No ecchymoses, No cyanosis	  Neurologic: Non-focal  Extremities: No clubbing, cyanosis  Vascular: Peripheral pulses palpable 2+ bilaterally    LABS:   CBC Full  -  ( 10 Apr 2019 11:01 )  WBC Count : 15.3 K/uL  RBC Count : 6.18 M/uL  Hemoglobin : 16.8 g/dL  Hematocrit : 54.4 %  Platelet Count - Automated : 296 K/uL  Mean Cell Volume : 88.0 fl  Mean Cell Hemoglobin : 27.2 pg  Mean Cell Hemoglobin Concentration : 30.8 gm/dL    04-10    132<L>  |  85<L>  |  25<H>  ----------------------------<  129<H>  3.8   |  25  |  0.84    Ca    9.4      10 Apr 2019 03:37  Phos  3.5     04-10  Mg     2.2     04-10    TPro  8.0  /  Alb  4.0  /  TBili  4.5<H>  /  DBili  x   /  AST  149<H>  /  ALT  41  /  AlkPhos  105  04-10    CARDIAC MARKERS ( 10 Apr 2019 03:37 )  x     / x     / 681 U/L / x     / 33.3 ng/mL  CARDIAC MARKERS ( 09 Apr 2019 12:00 )  x     / x     / 1749 U/L / x     / 187.2 ng/mL  CARDIAC MARKERS ( 09 Apr 2019 03:28 )  x     / x     / 2177 U/L / x     / 295.8 ng/mL    Cardiac Cath 4/8/2019  PROCEDURE:  --  Left heart catheterization.  --  Left coronary angiography.  --  Right coronary angiography.  --  Sonosite - Diagnostic.    CORONARY VESSELS: The coronary circulation is left dominant.  LM:   --  LM: Angiography showed minor luminal irregularities with no flow  limiting lesions.  LAD:   --  Ostial LAD: There was a tubular 70 % stenosis at the site of a  prior angioplasty with stent placement.  CX:   --  Proximal circumflex: There was a diffuse 20 % stenosis at the  site of a prior stent placement with brachytherapy. There was DIANA grade 3  flow through the vessel (brisk flow).  RCA:   --  Mid RCA: There was a diffuse 10 % stenosis at the site of a  prior angioplasty with stent placement.  COMPLICATIONS: There were no complications.  DIAGNOSTIC RECOMMENDATIONS: Patient in decompensated CHF, need diuresis and  medical therapy.  INTERVENTIONAL RECOMMENDATIONS: Patient in decompensated CHF, need diuresis  and medical therapy.    ECHO 4/9/2019  Conclusions:  1. Mild mitral annular calcification. Tethered mitral valve  leaflets with normal opening. Moderate mitral  regurgitation.  2. Calcified trileaflet aortic valve with normal opening.  No aortic valve regurgitation seen.  3. Eccentric left ventricular hypertrophy (dilated left  ventricle with normal relative wall thickness).  4. Severe global left ventricular systolic dysfunction with  regional variation. The inferior, inferolateral, distal  segments and the apical cap are akinetic. The mid to distal  septum and the lateral walls are severely hypokinetic.  Endocardial visualization enhanced with intravenous  injection of Ultrasonic Enhancing Agent (Definity). There  is swirling of intravenous ultrasonic enhancing agent at  the apex suggestive of a low flow state. No left  ventricular thrombus.  5. The right ventricle is not well visualized; grossly  normal right ventricular size and systolic function. A  device wire is noted in the right heart.  *** Compared with echocardiogram of 11/16/2018, results are  similar on today's study.    IMPRESSION AND PLAN:  64 year old M w/ pmh of chronic systolic HF (EF 20%) w/ AICD, LV thrombus on coumadin, PAD, CAD w/ 9 stents presenting with NSTEMI s/p LHC with oLAD 80%, pCx 50%, dRCA 100% c/b flash pulmonary edema; improving with IV diuresis with plans to have PCI of pLAD.     1) CAD   NSTEMI with acute on chronic decompensated heart failure  ICD interrogation with no significant findings    ·	Once euvolemic, will pursue with cardiac cath. Approaching   ·	Continue medical management wih aspirin 81 mg daily, ticagrelor 90 mg BID, atorvastatin 80 mg nightly    Sowmya Deng MD, MPH, LOREE  Cardiovascular Specialist Attending  [facility]  C: [phone]  E: [email or web address]  (Cardiology nocturnist available every night 7 pm - 7 am; available week days for follow-up; general cardiology consult coverage weekend days)

## 2019-04-11 NOTE — PROGRESS NOTE ADULT - PROBLEM SELECTOR PLAN 3
BP below goal. Consider ACEI if BP can tolerate. BP below goal.   holding ace-I (lisinopril), spironolactone, imdur due to hypotension  holding bb (coreg) in setting of acute chf

## 2019-04-12 LAB
ALBUMIN SERPL ELPH-MCNC: 4.1 G/DL — SIGNIFICANT CHANGE UP (ref 3.3–5)
ALP SERPL-CCNC: 113 U/L — SIGNIFICANT CHANGE UP (ref 40–120)
ALT FLD-CCNC: 57 U/L — HIGH (ref 10–45)
ANION GAP SERPL CALC-SCNC: 16 MMOL/L — SIGNIFICANT CHANGE UP (ref 5–17)
APTT BLD: 66.6 SEC — HIGH (ref 27.5–36.3)
APTT BLD: 69.2 SEC — HIGH (ref 27.5–36.3)
AST SERPL-CCNC: 69 U/L — HIGH (ref 10–40)
BILIRUB DIRECT SERPL-MCNC: 0.4 MG/DL — HIGH (ref 0–0.2)
BILIRUB INDIRECT FLD-MCNC: 2.3 MG/DL — HIGH (ref 0.2–1)
BILIRUB SERPL-MCNC: 2.7 MG/DL — HIGH (ref 0.2–1.2)
BUN SERPL-MCNC: 28 MG/DL — HIGH (ref 7–23)
CALCIUM SERPL-MCNC: 10 MG/DL — SIGNIFICANT CHANGE UP (ref 8.4–10.5)
CHLORIDE SERPL-SCNC: 83 MMOL/L — LOW (ref 96–108)
CO2 SERPL-SCNC: 32 MMOL/L — HIGH (ref 22–31)
CREAT SERPL-MCNC: 0.8 MG/DL — SIGNIFICANT CHANGE UP (ref 0.5–1.3)
GLUCOSE SERPL-MCNC: 124 MG/DL — HIGH (ref 70–99)
HAPTOGLOB SERPL-MCNC: 394 MG/DL — HIGH (ref 34–200)
HCT VFR BLD CALC: 56 % — HIGH (ref 39–50)
HGB BLD-MCNC: 17.5 G/DL — HIGH (ref 13–17)
INR BLD: 1.27 RATIO — HIGH (ref 0.88–1.16)
LDH SERPL L TO P-CCNC: 438 U/L — HIGH (ref 50–242)
MCHC RBC-ENTMCNC: 28.2 PG — SIGNIFICANT CHANGE UP (ref 27–34)
MCHC RBC-ENTMCNC: 31.3 GM/DL — LOW (ref 32–36)
MCV RBC AUTO: 90.3 FL — SIGNIFICANT CHANGE UP (ref 80–100)
PLATELET # BLD AUTO: 284 K/UL — SIGNIFICANT CHANGE UP (ref 150–400)
POTASSIUM SERPL-MCNC: 3.6 MMOL/L — SIGNIFICANT CHANGE UP (ref 3.5–5.3)
POTASSIUM SERPL-SCNC: 3.6 MMOL/L — SIGNIFICANT CHANGE UP (ref 3.5–5.3)
PROT SERPL-MCNC: 8.2 G/DL — SIGNIFICANT CHANGE UP (ref 6–8.3)
PROTHROM AB SERPL-ACNC: 14.6 SEC — HIGH (ref 10–12.9)
RBC # BLD: 6.2 M/UL — HIGH (ref 4.2–5.8)
RBC # FLD: 13.6 % — SIGNIFICANT CHANGE UP (ref 10.3–14.5)
SODIUM SERPL-SCNC: 131 MMOL/L — LOW (ref 135–145)
WBC # BLD: 11.6 K/UL — HIGH (ref 3.8–10.5)
WBC # FLD AUTO: 11.6 K/UL — HIGH (ref 3.8–10.5)

## 2019-04-12 PROCEDURE — 76700 US EXAM ABDOM COMPLETE: CPT | Mod: 26

## 2019-04-12 PROCEDURE — 99233 SBSQ HOSP IP/OBS HIGH 50: CPT

## 2019-04-12 RX ORDER — POTASSIUM CHLORIDE 20 MEQ
40 PACKET (EA) ORAL ONCE
Qty: 0 | Refills: 0 | Status: COMPLETED | OUTPATIENT
Start: 2019-04-12 | End: 2019-04-12

## 2019-04-12 RX ORDER — DIGOXIN 250 MCG
0.12 TABLET ORAL DAILY
Qty: 0 | Refills: 0 | Status: DISCONTINUED | OUTPATIENT
Start: 2019-04-12 | End: 2019-04-14

## 2019-04-12 RX ORDER — BUMETANIDE 0.25 MG/ML
2 INJECTION INTRAMUSCULAR; INTRAVENOUS EVERY 12 HOURS
Qty: 0 | Refills: 0 | Status: DISCONTINUED | OUTPATIENT
Start: 2019-04-12 | End: 2019-04-14

## 2019-04-12 RX ORDER — POLYETHYLENE GLYCOL 3350 17 G/17G
17 POWDER, FOR SOLUTION ORAL ONCE
Qty: 0 | Refills: 0 | Status: COMPLETED | OUTPATIENT
Start: 2019-04-12 | End: 2019-04-12

## 2019-04-12 RX ADMIN — Medication 40 MILLIEQUIVALENT(S): at 13:45

## 2019-04-12 RX ADMIN — TICAGRELOR 90 MILLIGRAM(S): 90 TABLET ORAL at 05:25

## 2019-04-12 RX ADMIN — HEPARIN SODIUM 1350 UNIT(S)/HR: 5000 INJECTION INTRAVENOUS; SUBCUTANEOUS at 01:12

## 2019-04-12 RX ADMIN — Medication 81 MILLIGRAM(S): at 13:47

## 2019-04-12 RX ADMIN — POLYETHYLENE GLYCOL 3350 17 GRAM(S): 17 POWDER, FOR SOLUTION ORAL at 22:26

## 2019-04-12 RX ADMIN — BUMETANIDE 2 MILLIGRAM(S): 0.25 INJECTION INTRAMUSCULAR; INTRAVENOUS at 17:59

## 2019-04-12 RX ADMIN — TICAGRELOR 90 MILLIGRAM(S): 90 TABLET ORAL at 18:03

## 2019-04-12 RX ADMIN — BUMETANIDE 10 MG/HR: 0.25 INJECTION INTRAMUSCULAR; INTRAVENOUS at 05:27

## 2019-04-12 RX ADMIN — ATORVASTATIN CALCIUM 80 MILLIGRAM(S): 80 TABLET, FILM COATED ORAL at 22:07

## 2019-04-12 RX ADMIN — HEPARIN SODIUM 1350 UNIT(S)/HR: 5000 INJECTION INTRAVENOUS; SUBCUTANEOUS at 07:25

## 2019-04-12 NOTE — PROGRESS NOTE ADULT - PROBLEM SELECTOR PLAN 3
BP below goal.   holding ace-I (lisinopril), spironolactone, imdur due to hypotension  holding bb (coreg) in setting of acute chf

## 2019-04-12 NOTE — PROGRESS NOTE ADULT - PROBLEM SELECTOR PLAN 2
- continue Bumex gtt  -Daily weight  -Strict I's and O's  -ICD interrogated.  on digoxin at home - ?restart - cards f/u -d/c bumex gtt, started on bumex 2mg iv bid   Cardiology f/u appreciated  -Daily weight  -Strict I's and O's  -ICD interrogated.  on digoxin at home - restarted - cards f/u

## 2019-04-12 NOTE — PROGRESS NOTE ADULT - SUBJECTIVE AND OBJECTIVE BOX
Patient is a 64y old  Male who presents with a chief complaint of Chest Pain (12 Apr 2019 16:45)        SUBJECTIVE / OVERNIGHT EVENTS: no acute complaints      MEDICATIONS  (STANDING):  aspirin enteric coated 81 milliGRAM(s) Oral daily  atorvastatin 80 milliGRAM(s) Oral at bedtime  buMETAnide Injectable 2 milliGRAM(s) IV Push every 12 hours  heparin  Infusion.  Unit(s)/Hr (10 mL/Hr) IV Continuous <Continuous>  ticagrelor 90 milliGRAM(s) Oral two times a day    MEDICATIONS  (PRN):  acetaminophen   Tablet .. 650 milliGRAM(s) Oral every 6 hours PRN Mild Pain (1 - 3), Moderate Pain (4 - 6), Severe Pain (7 - 10)  aluminum hydroxide/magnesium hydroxide/simethicone Suspension 30 milliLiter(s) Oral every 6 hours PRN Dyspepsia  heparin  Injectable 5000 Unit(s) IV Push every 6 hours PRN For aPTT less than 40      Vital Signs Last 24 Hrs  T(C): 36.6 (12 Apr 2019 13:50), Max: 36.8 (11 Apr 2019 21:17)  T(F): 97.9 (12 Apr 2019 13:50), Max: 98.3 (11 Apr 2019 21:17)  HR: 94 (12 Apr 2019 13:50) (86 - 97)  BP: 106/75 (12 Apr 2019 13:50) (90/64 - 106/75)  BP(mean): --  RR: 18 (12 Apr 2019 13:50) (18 - 18)  SpO2: 92% (12 Apr 2019 13:50) (92% - 95%)  CAPILLARY BLOOD GLUCOSE        I&O's Summary    11 Apr 2019 07:01  -  12 Apr 2019 07:00  --------------------------------------------------------  IN: 1060 mL / OUT: 2950 mL / NET: -1890 mL    12 Apr 2019 07:01  -  12 Apr 2019 19:00  --------------------------------------------------------  IN: 0 mL / OUT: 1400 mL / NET: -1400 mL        PHYSICAL EXAM:  GENERAL: NAD  EYES: conjunctiva and sclera clear  NECK: No JVD  CHEST/LUNG: bibasilar crackles   HEART: S1 S2 RRR  ABDOMEN: +BS Soft, NT/ND, no hepatomegaly  EXTREMITIES:  2+ DP Pulses, No c/c. No LE edema  NEUROLOGY: AAOx3, no focal deficits   SKIN: No rashes or lesions    LABS:                        17.5   11.6  )-----------( 284      ( 12 Apr 2019 06:41 )             56.0     04-12    131<L>  |  83<L>  |  28<H>  ----------------------------<  124<H>  3.6   |  32<H>  |  0.80    Ca    10.0      12 Apr 2019 06:41  Phos  4.2     04-11  Mg     2.3     04-11    TPro  8.2  /  Alb  4.1  /  TBili  2.7<H>  /  DBili  0.4<H>  /  AST  69<H>  /  ALT  57<H>  /  AlkPhos  113  04-12    PT/INR - ( 12 Apr 2019 00:44 )   PT: 14.6 sec;   INR: 1.27 ratio         PTT - ( 12 Apr 2019 06:41 )  PTT:69.2 sec          RADIOLOGY & ADDITIONAL TESTS:    Imaging Personally Reviewed:  Consultant(s) Notes Reviewed:    Care Discussed with Consultants/Other Providers:

## 2019-04-12 NOTE — PROGRESS NOTE ADULT - PROBLEM SELECTOR PLAN 4
unclear etiology - no abdominal tenderness on exam  alk phos is wnl but increasing   will check breakdown of bilirubin, ldh and haptoglobin  if alk phos continues to rise, will check US abd unclear etiology - no abdominal tenderness on exam  alk phos wnl   will check breakdown of bilirubin, ldh and haptoglobin  US abd with hepatic echogenic foci, if LFTs rise consider hepatology evaluation  hepatitis panel

## 2019-04-12 NOTE — PROGRESS NOTE ADULT - PROBLEM SELECTOR PLAN 6
Started on heparin gtt  (was on coumadin at home) Started on heparin gtt  was on coumadin at home- likely restart after cath

## 2019-04-12 NOTE — PROVIDER CONTACT NOTE (OTHER) - ASSESSMENT
dumex drip contd, noted with scant amount of dark red sputum in cup, denies any sob, sp02 @3L/min states "my nose feels dry"

## 2019-04-12 NOTE — PROVIDER CONTACT NOTE (OTHER) - ACTION/TREATMENT ORDERED:
o2 humidified, cbc milad, contd to milad as per np
Serum Lactate and VBG with lytes. MD/NP to reattempt at later time.
please see RRT flowsheets

## 2019-04-13 LAB
ALBUMIN SERPL ELPH-MCNC: 3.4 G/DL — SIGNIFICANT CHANGE UP (ref 3.3–5)
ALP SERPL-CCNC: 100 U/L — SIGNIFICANT CHANGE UP (ref 40–120)
ALT FLD-CCNC: 57 U/L — HIGH (ref 10–45)
ANION GAP SERPL CALC-SCNC: 18 MMOL/L — HIGH (ref 5–17)
APTT BLD: 58 SEC — HIGH (ref 27.5–36.3)
AST SERPL-CCNC: 56 U/L — HIGH (ref 10–40)
BILIRUB SERPL-MCNC: 2.1 MG/DL — HIGH (ref 0.2–1.2)
BUN SERPL-MCNC: 32 MG/DL — HIGH (ref 7–23)
CALCIUM SERPL-MCNC: 9.4 MG/DL — SIGNIFICANT CHANGE UP (ref 8.4–10.5)
CHLORIDE SERPL-SCNC: 83 MMOL/L — LOW (ref 96–108)
CO2 SERPL-SCNC: 28 MMOL/L — SIGNIFICANT CHANGE UP (ref 22–31)
CREAT SERPL-MCNC: 0.74 MG/DL — SIGNIFICANT CHANGE UP (ref 0.5–1.3)
FERRITIN SERPL-MCNC: 553 NG/ML — HIGH (ref 30–400)
GAS PNL BLDV: SIGNIFICANT CHANGE UP
GLUCOSE BLDC GLUCOMTR-MCNC: 144 MG/DL — HIGH (ref 70–99)
GLUCOSE SERPL-MCNC: 127 MG/DL — HIGH (ref 70–99)
HAV IGM SER-ACNC: SIGNIFICANT CHANGE UP
HBV CORE IGM SER-ACNC: SIGNIFICANT CHANGE UP
HBV SURFACE AG SER-ACNC: SIGNIFICANT CHANGE UP
HCT VFR BLD CALC: 13.9 % — CRITICAL LOW (ref 39–50)
HCT VFR BLD CALC: 48.4 % — SIGNIFICANT CHANGE UP (ref 39–50)
HCV AB S/CO SERPL IA: 0.22 S/CO — SIGNIFICANT CHANGE UP (ref 0–0.99)
HCV AB SERPL-IMP: SIGNIFICANT CHANGE UP
HGB BLD-MCNC: 16 G/DL — SIGNIFICANT CHANGE UP (ref 13–17)
HGB BLD-MCNC: 4.7 G/DL — CRITICAL LOW (ref 13–17)
INR BLD: 1.13 RATIO — SIGNIFICANT CHANGE UP (ref 0.88–1.16)
IRON SATN MFR SERPL: 29 % — SIGNIFICANT CHANGE UP (ref 16–55)
IRON SATN MFR SERPL: 90 UG/DL — SIGNIFICANT CHANGE UP (ref 45–165)
MCHC RBC-ENTMCNC: 29.6 PG — SIGNIFICANT CHANGE UP (ref 27–34)
MCHC RBC-ENTMCNC: 31.3 PG — SIGNIFICANT CHANGE UP (ref 27–34)
MCHC RBC-ENTMCNC: 33.1 GM/DL — SIGNIFICANT CHANGE UP (ref 32–36)
MCHC RBC-ENTMCNC: 33.7 GM/DL — SIGNIFICANT CHANGE UP (ref 32–36)
MCV RBC AUTO: 89.5 FL — SIGNIFICANT CHANGE UP (ref 80–100)
MCV RBC AUTO: 92.8 FL — SIGNIFICANT CHANGE UP (ref 80–100)
PLATELET # BLD AUTO: 266 K/UL — SIGNIFICANT CHANGE UP (ref 150–400)
POTASSIUM SERPL-MCNC: 3.8 MMOL/L — SIGNIFICANT CHANGE UP (ref 3.5–5.3)
POTASSIUM SERPL-SCNC: 3.8 MMOL/L — SIGNIFICANT CHANGE UP (ref 3.5–5.3)
PROT SERPL-MCNC: 7.3 G/DL — SIGNIFICANT CHANGE UP (ref 6–8.3)
PROTHROM AB SERPL-ACNC: 13.1 SEC — HIGH (ref 10–12.9)
RBC # BLD: 1.5 M/UL — LOW (ref 4.2–5.8)
RBC # BLD: 5.41 M/UL — SIGNIFICANT CHANGE UP (ref 4.2–5.8)
RBC # FLD: 12.8 % — SIGNIFICANT CHANGE UP (ref 10.3–14.5)
RBC # FLD: 13.4 % — SIGNIFICANT CHANGE UP (ref 10.3–14.5)
SODIUM SERPL-SCNC: 129 MMOL/L — LOW (ref 135–145)
TIBC SERPL-MCNC: 309 UG/DL — SIGNIFICANT CHANGE UP (ref 220–430)
UIBC SERPL-MCNC: 219 UG/DL — SIGNIFICANT CHANGE UP (ref 110–370)
URATE SERPL-MCNC: 9.6 MG/DL — HIGH (ref 3.4–8.8)
WBC # BLD: 11.7 K/UL — HIGH (ref 3.8–10.5)
WBC # BLD: 4.6 K/UL — SIGNIFICANT CHANGE UP (ref 3.8–10.5)
WBC # FLD AUTO: 11.7 K/UL — HIGH (ref 3.8–10.5)
WBC # FLD AUTO: 4.6 K/UL — SIGNIFICANT CHANGE UP (ref 3.8–10.5)

## 2019-04-13 PROCEDURE — 99233 SBSQ HOSP IP/OBS HIGH 50: CPT

## 2019-04-13 PROCEDURE — 74177 CT ABD & PELVIS W/CONTRAST: CPT | Mod: 26

## 2019-04-13 PROCEDURE — 93010 ELECTROCARDIOGRAM REPORT: CPT

## 2019-04-13 RX ORDER — POTASSIUM CHLORIDE 20 MEQ
40 PACKET (EA) ORAL ONCE
Qty: 0 | Refills: 0 | Status: COMPLETED | OUTPATIENT
Start: 2019-04-13 | End: 2019-04-13

## 2019-04-13 RX ORDER — CARVEDILOL PHOSPHATE 80 MG/1
3.12 CAPSULE, EXTENDED RELEASE ORAL EVERY 12 HOURS
Qty: 0 | Refills: 0 | Status: DISCONTINUED | OUTPATIENT
Start: 2019-04-13 | End: 2019-04-14

## 2019-04-13 RX ORDER — WARFARIN SODIUM 2.5 MG/1
5 TABLET ORAL ONCE
Qty: 0 | Refills: 0 | Status: COMPLETED | OUTPATIENT
Start: 2019-04-13 | End: 2019-04-13

## 2019-04-13 RX ADMIN — Medication 81 MILLIGRAM(S): at 10:47

## 2019-04-13 RX ADMIN — BUMETANIDE 2 MILLIGRAM(S): 0.25 INJECTION INTRAMUSCULAR; INTRAVENOUS at 17:04

## 2019-04-13 RX ADMIN — HEPARIN SODIUM 1350 UNIT(S)/HR: 5000 INJECTION INTRAVENOUS; SUBCUTANEOUS at 10:46

## 2019-04-13 RX ADMIN — BUMETANIDE 2 MILLIGRAM(S): 0.25 INJECTION INTRAMUSCULAR; INTRAVENOUS at 05:42

## 2019-04-13 RX ADMIN — Medication 0.12 MILLIGRAM(S): at 05:41

## 2019-04-13 RX ADMIN — TICAGRELOR 90 MILLIGRAM(S): 90 TABLET ORAL at 05:41

## 2019-04-13 RX ADMIN — ATORVASTATIN CALCIUM 80 MILLIGRAM(S): 80 TABLET, FILM COATED ORAL at 21:21

## 2019-04-13 RX ADMIN — TICAGRELOR 90 MILLIGRAM(S): 90 TABLET ORAL at 17:04

## 2019-04-13 RX ADMIN — WARFARIN SODIUM 5 MILLIGRAM(S): 2.5 TABLET ORAL at 21:21

## 2019-04-13 RX ADMIN — CARVEDILOL PHOSPHATE 3.12 MILLIGRAM(S): 80 CAPSULE, EXTENDED RELEASE ORAL at 21:21

## 2019-04-13 RX ADMIN — Medication 40 MILLIEQUIVALENT(S): at 21:21

## 2019-04-13 NOTE — PROGRESS NOTE ADULT - PROBLEM SELECTOR PROBLEM 7
Rheumatoid arthritis involving both hands, unspecified rheumatoid factor presence

## 2019-04-13 NOTE — PROGRESS NOTE ADULT - PROBLEM SELECTOR PROBLEM 2
Acute on chronic systolic heart failure

## 2019-04-13 NOTE — PROGRESS NOTE ADULT - PROBLEM SELECTOR PROBLEM 1
NSTEMI (non-ST elevated myocardial infarction)

## 2019-04-13 NOTE — PROGRESS NOTE ADULT - SUBJECTIVE AND OBJECTIVE BOX
SUBJ:  No acute events. Feeling well. Denies shortness of breath and chest pain. I reviewed the imaging and case with Dr. Kasie Brian (invasive cardiologist) who has dne multipl interventions on him. He feels the pLAD lesion is generally stable and prefers not intervening on the lesion given the stability for 3+ years. There is progressive distal disease not amenable to intervention. Continue optimization and diuresis with discharge disposition planning over the weekend.     MEDICATIONS  (STANDING):  aspirin enteric coated 81 milliGRAM(s) Oral daily  atorvastatin 80 milliGRAM(s) Oral at bedtime  buMETAnide Injectable 2 milliGRAM(s) IV Push every 12 hours  digoxin     Tablet 0.125 milliGRAM(s) Oral daily  heparin  Infusion.  Unit(s)/Hr (10 mL/Hr) IV Continuous <Continuous>  ticagrelor 90 milliGRAM(s) Oral two times a day    MEDICATIONS  (PRN):  acetaminophen   Tablet .. 650 milliGRAM(s) Oral every 6 hours PRN Mild Pain (1 - 3), Moderate Pain (4 - 6), Severe Pain (7 - 10)  aluminum hydroxide/magnesium hydroxide/simethicone Suspension 30 milliLiter(s) Oral every 6 hours PRN Dyspepsia  heparin  Injectable 5000 Unit(s) IV Push every 6 hours PRN For aPTT less than 40    Vital Signs Last 24 Hrs  T(C): 36.4 (12 Apr 2019 20:50), Max: 36.6 (12 Apr 2019 13:50)  T(F): 97.5 (12 Apr 2019 20:50), Max: 97.9 (12 Apr 2019 13:50)  HR: 102 (12 Apr 2019 20:50) (86 - 102)  BP: 93/66 (12 Apr 2019 20:50) (90/64 - 106/75)  RR: 18 (12 Apr 2019 20:50) (18 - 18)  SpO2: 95% (12 Apr 2019 20:50) (92% - 95%)    REVIEW OF SYSTEMS:  As per HPI, otherwise unremarkable.     PHYSICAL EXAM:  · CONSTITUTIONAL: Well-developed, well nourished      · EYES: EOMI; PERRL; no drainage or redness  · NECK: No bruits; no thyromegaly or nodules  ·RESPIRATORY:   airway patent; breath sounds equal; good air movement; respirations non-labored; clear to auscultation bilaterally; no chest wall tenderness; no intercostal retractions; no rales,rhonchi or wheeze  · CARDIOVASCULAR: regular rate and rhythm  no rub  no murmur  normal PMI  . GASTROINTESTINAL:  no distention; no masses palpable; bowel sounds normal  · EXTREMITIES: No cyanosis, clubbing or edema  · VASCULAR:  Equal and normal pulses (radial, femoral)  ·NEUROLOGICAL:  Alert and oriented x 3; sensation intact; deep reflexes intact; cranial nerves intact; no spontaneous movement; superficial reflexes intact; normal strength  · SKIN: No lesions; no rash  . LYMPH NODES: No lymphadedenopathy  · MUSCULOSKELETAL:  No calf tenderness  no joint swelling	    LABS:   CBC Full  -  ( 12 Apr 2019 06:41 )  WBC Count : 11.6 K/uL  RBC Count : 6.20 M/uL  Hemoglobin : 17.5 g/dL  Hematocrit : 56.0 %  Platelet Count - Automated : 284 K/uL  Mean Cell Volume : 90.3 fl  Mean Cell Hemoglobin : 28.2 pg  Mean Cell Hemoglobin Concentration : 31.3 gm/dL    04-12    131<L>  |  83<L>  |  28<H>  ----------------------------<  124<H>  3.6   |  32<H>  |  0.80    Ca    10.0      12 Apr 2019 06:41  Phos  4.2     04-11  Mg     2.3     04-11    TPro  8.2  /  Alb  4.1  /  TBili  2.7<H>  /  DBili  0.4<H>  /  AST  69<H>  /  ALT  57<H>  /  AlkPhos  113  04-12    Cardiac Cath 4/8/2019  PROCEDURE:  --  Left heart catheterization.  --  Left coronary angiography.  --  Right coronary angiography.  --  Sonosite - Diagnostic.    CORONARY VESSELS: The coronary circulation is left dominant.  LM:   --  LM: Angiography showed minor luminal irregularities with no flow  limiting lesions.  LAD:   --  Ostial LAD: There was a tubular 70 % stenosis at the site of a  prior angioplasty with stent placement.  CX:   --  Proximal circumflex: There was a diffuse 20 % stenosis at the  site of a prior stent placement with brachytherapy. There was DIANA grade 3  flow through the vessel (brisk flow).  RCA:   --  Mid RCA: There was a diffuse 10 % stenosis at the site of a  prior angioplasty with stent placement.  COMPLICATIONS: There were no complications.  DIAGNOSTIC RECOMMENDATIONS: Patient in decompensated CHF, need diuresis and  medical therapy.  INTERVENTIONAL RECOMMENDATIONS: Patient in decompensated CHF, need diuresis  and medical therapy.    ECHO 4/9/2019  Conclusions:  1. Mild mitral annular calcification. Tethered mitral valve  leaflets with normal opening. Moderate mitral  regurgitation.  2. Calcified trileaflet aortic valve with normal opening.  No aortic valve regurgitation seen.  3. Eccentric left ventricular hypertrophy (dilated left  ventricle with normal relative wall thickness).  4. Severe global left ventricular systolic dysfunction with  regional variation. The inferior, inferolateral, distal  segments and the apical cap are akinetic. The mid to distal  septum and the lateral walls are severely hypokinetic.  Endocardial visualization enhanced with intravenous  injection of Ultrasonic Enhancing Agent (Definity). There  is swirling of intravenous ultrasonic enhancing agent at  the apex suggestive of a low flow state. No left  ventricular thrombus.  5. The right ventricle is not well visualized; grossly  normal right ventricular size and systolic function. A  device wire is noted in the right heart.  *** Compared with echocardiogram of 11/16/2018, results are  similar on today's study.    IMPRESSION AND PLAN:  64 year old M w/ pmh of chronic systolic HF (EF 20%) w/ AICD, LV thrombus on coumadin, PAD, pAF, CAD w/ 9 stents presenting with NSTEMI s/p C with oLAD 80%, pCx 50%, dRCA 100% c/b flash pulmonary edema; improving with IV diuresis.    1) CAD   NSTEMI with acute on chronic decompensated heart failure  ICD interrogation with no significant findings    ·	Continue medical management with aspirin 81 mg daily, atorvastatin 80 mg nightly.   ·	Home regimen of clopidogrel 75 mg daily but is currently on ticagrelor. Transition back to clopidogrel due to the potential need for triple therapy and/or anticoagulation with either coumadin or DOAC.     2) LV thombus, resolved   ECHO with low flow state but no thrombus.     ·	Home coumadin can be resumed although discontinuing coumadin and transitioned to DOAC for CVA protection from pAF is an option. (I'll email Kasie since h is outpatient cariologist)     3) HFrEF   EF 20%)   Severe volume overload; compensated with IV diuresis.    ·	Transition to bumetanide 20 mg IV BID   ·	Continue medical management with digoxin 125 mcg daily.   ·	Start carvedilol 6.25 mg BID and lisinopril 2.5 mg daily for afterload reduction. (can introduce one at a time) but need to get him back on some of his home medications.     4) pAF   minimal recorded events    ·	Anticoagulation as above.     Sowmya Deng MD, MPH, LOREE  Cardiovascular Specialist Attending  [facility]  C: [phone]  E: [email or web address]  (Cardiology nocturnist available every night 7 pm - 7 am; available week days for follow-up; general cardiology consult coverage weekend days) SUBJ:  No acute events. Feeling well. Denies shortness of breath and chest pain. I reviewed the imaging and case with Dr. Kasie Brian (invasive cardiologist) who has dne multipl interventions on him. He feels the pLAD lesion is generally stable and prefers not intervening on the lesion given the stability for 3+ years. There is progressive distal disease not amenable to intervention. Continue optimization and diuresis with discharge disposition planning over the weekend.     MEDICATIONS  (STANDING):  aspirin enteric coated 81 milliGRAM(s) Oral daily  atorvastatin 80 milliGRAM(s) Oral at bedtime  buMETAnide Injectable 2 milliGRAM(s) IV Push every 12 hours  digoxin     Tablet 0.125 milliGRAM(s) Oral daily  heparin  Infusion.  Unit(s)/Hr (10 mL/Hr) IV Continuous <Continuous>  ticagrelor 90 milliGRAM(s) Oral two times a day    MEDICATIONS  (PRN):  acetaminophen   Tablet .. 650 milliGRAM(s) Oral every 6 hours PRN Mild Pain (1 - 3), Moderate Pain (4 - 6), Severe Pain (7 - 10)  aluminum hydroxide/magnesium hydroxide/simethicone Suspension 30 milliLiter(s) Oral every 6 hours PRN Dyspepsia  heparin  Injectable 5000 Unit(s) IV Push every 6 hours PRN For aPTT less than 40    Vital Signs Last 24 Hrs  T(C): 36.4 (12 Apr 2019 20:50), Max: 36.6 (12 Apr 2019 13:50)  T(F): 97.5 (12 Apr 2019 20:50), Max: 97.9 (12 Apr 2019 13:50)  HR: 102 (12 Apr 2019 20:50) (86 - 102)  BP: 93/66 (12 Apr 2019 20:50) (90/64 - 106/75)  RR: 18 (12 Apr 2019 20:50) (18 - 18)  SpO2: 95% (12 Apr 2019 20:50) (92% - 95%)    REVIEW OF SYSTEMS:  As per HPI, otherwise unremarkable.     PHYSICAL EXAM:  · CONSTITUTIONAL: Well-developed, well nourished      · EYES: EOMI; PERRL; no drainage or redness  · NECK: No bruits; no thyromegaly or nodules  ·RESPIRATORY:   airway patent; breath sounds equal; good air movement; respirations non-labored; clear to auscultation bilaterally; no chest wall tenderness; no intercostal retractions; no rales,rhonchi or wheeze  · CARDIOVASCULAR: regular rate and rhythm  no rub  no murmur  normal PMI  . GASTROINTESTINAL:  no distention; no masses palpable; bowel sounds normal  · EXTREMITIES: No cyanosis, clubbing or edema  · VASCULAR:  Equal and normal pulses (radial, femoral)  ·NEUROLOGICAL:  Alert and oriented x 3; sensation intact; deep reflexes intact; cranial nerves intact; no spontaneous movement; superficial reflexes intact; normal strength  · SKIN: No lesions; no rash  . LYMPH NODES: No lymphadedenopathy  · MUSCULOSKELETAL:  No calf tenderness  no joint swelling	    LABS:   CBC Full  -  ( 12 Apr 2019 06:41 )  WBC Count : 11.6 K/uL  RBC Count : 6.20 M/uL  Hemoglobin : 17.5 g/dL  Hematocrit : 56.0 %  Platelet Count - Automated : 284 K/uL  Mean Cell Volume : 90.3 fl  Mean Cell Hemoglobin : 28.2 pg  Mean Cell Hemoglobin Concentration : 31.3 gm/dL    04-12    131<L>  |  83<L>  |  28<H>  ----------------------------<  124<H>  3.6   |  32<H>  |  0.80    Ca    10.0      12 Apr 2019 06:41  Phos  4.2     04-11  Mg     2.3     04-11    TPro  8.2  /  Alb  4.1  /  TBili  2.7<H>  /  DBili  0.4<H>  /  AST  69<H>  /  ALT  57<H>  /  AlkPhos  113  04-12    Cardiac Cath 4/8/2019  PROCEDURE:  --  Left heart catheterization.  --  Left coronary angiography.  --  Right coronary angiography.  --  Sonosite - Diagnostic.    CORONARY VESSELS: The coronary circulation is left dominant.  LM:   --  LM: Angiography showed minor luminal irregularities with no flow  limiting lesions.  LAD:   --  Ostial LAD: There was a tubular 70 % stenosis at the site of a  prior angioplasty with stent placement.  CX:   --  Proximal circumflex: There was a diffuse 20 % stenosis at the  site of a prior stent placement with brachytherapy. There was DIANA grade 3  flow through the vessel (brisk flow).  RCA:   --  Mid RCA: There was a diffuse 10 % stenosis at the site of a  prior angioplasty with stent placement.  COMPLICATIONS: There were no complications.  DIAGNOSTIC RECOMMENDATIONS: Patient in decompensated CHF, need diuresis and  medical therapy.  INTERVENTIONAL RECOMMENDATIONS: Patient in decompensated CHF, need diuresis  and medical therapy.    ECHO 4/9/2019  Conclusions:  1. Mild mitral annular calcification. Tethered mitral valve  leaflets with normal opening. Moderate mitral  regurgitation.  2. Calcified trileaflet aortic valve with normal opening.  No aortic valve regurgitation seen.  3. Eccentric left ventricular hypertrophy (dilated left  ventricle with normal relative wall thickness).  4. Severe global left ventricular systolic dysfunction with  regional variation. The inferior, inferolateral, distal  segments and the apical cap are akinetic. The mid to distal  septum and the lateral walls are severely hypokinetic.  Endocardial visualization enhanced with intravenous  injection of Ultrasonic Enhancing Agent (Definity). There  is swirling of intravenous ultrasonic enhancing agent at  the apex suggestive of a low flow state. No left  ventricular thrombus.  5. The right ventricle is not well visualized; grossly  normal right ventricular size and systolic function. A  device wire is noted in the right heart.  *** Compared with echocardiogram of 11/16/2018, results are  similar on today's study.    IMPRESSION AND PLAN:  64 year old M w/ pmh of chronic systolic HF (EF 20%) w/ AICD, LV thrombus on coumadin, PAD, pAF, CAD w/ 9 stents presenting with NSTEMI s/p C with oLAD 80%, pCx 50%, dRCA 100% c/b flash pulmonary edema; improving with IV diuresis.    1) CAD   NSTEMI with acute on chronic decompensated heart failure  ICD interrogation with no significant findings    ·	Continue medical management with aspirin 81 mg daily, atorvastatin 80 mg nightly.   ·	Home regimen of clopidogrel 75 mg daily but is currently on ticagrelor. Transition back to clopidogrel due to the potential need for triple therapy and/or anticoagulation with either coumadin or DOAC.   ·	No plans for cardiac cath with intervention as per Dr. Ferro.     2) LV thombus, resolved   ECHO with low flow state but no thrombus.     ·	Home coumadin can be resumed although discontinuing coumadin and transitioned to DOAC for CVA protection from pAF is an option. (I'll email Kasie since h is outpatient cariologist)     3) HFrEF   EF 20%)   Severe volume overload; compensated with IV diuresis.    ·	Transition to bumetanide 20 mg IV BID   ·	Continue medical management with digoxin 125 mcg daily.   ·	Start carvedilol 6.25 mg BID and lisinopril 2.5 mg daily for afterload reduction. (can introduce one at a time) but need to get him back on some of his home medications.     4) pAF   minimal recorded events    ·	Anticoagulation as above.     Sowmya Deng MD, MPH, LOREE  Cardiovascular Specialist Attending  [facility]  C: [phone]  E: [email or web address]  (Cardiology nocturnist available every night 7 pm - 7 am; available week days for follow-up; general cardiology consult coverage weekend days)

## 2019-04-13 NOTE — PROGRESS NOTE ADULT - PROBLEM SELECTOR PLAN 7
on methotrexate weekly (mondays)
on methotrexate weekly (mondays)    8. Prophylactic measure: -On heparin drip which covers DVT PPx.
on methotrexate weekly (mondays)

## 2019-04-13 NOTE — PROGRESS NOTE ADULT - PROBLEM SELECTOR PLAN 2
-C/w bumex 2mg iv bid   -Cardiology f/u appreciated  -Daily weights  -Strict I's and O's  -ICD interrogated.  -on digoxin at home - was restarted - cards f/u  -C/w DASH diet.

## 2019-04-13 NOTE — PROGRESS NOTE ADULT - ATTENDING COMMENTS
Chuck Wilder MD  Division of [facility]  Cell: [phone]  Pager: [phone]  Office: [phone]
Patient is seen and examined with fellow, NP and the CCU house-staff. I agree with the history, physical and the assessment and plan.  NSTEMI with acute on chronic decompensated heart failure  c/w diuresis  ICD interrogation pending  once euvolemic will pursue with cardiac cath
Dr. J[facility] Hospitalist  [phone]
Dr. JENNY CurryBarney Children's Medical Centerist  [phone]

## 2019-04-13 NOTE — PROGRESS NOTE ADULT - PROBLEM SELECTOR PLAN 4
-Possibly due to passive congestion of liver from CHF. Possibly due to liver lesions seen on US abd.   -Will get CT abd/pelvis C+/C- to further evaluate.   -Monitor LFT's.

## 2019-04-13 NOTE — PROGRESS NOTE ADULT - PROBLEM SELECTOR PLAN 1
- continue ASA 81mg daily, Brilinta 90mg BID, atorvastatin 80mg qhs; cardio says might consider changing Brilinta to Plavix.   -Will resume carvedilol at 3.125mg BID for now. Patient says BP usually runs 90/60's.  -Cards would also like to resume low dose lisinopril when/if possible.    -Per cards note, no plans for further PCI at this time.

## 2019-04-13 NOTE — PROGRESS NOTE ADULT - PROBLEM SELECTOR PLAN 6
-C/w heparin gtt and will start bridge to coumadin since reportedly no further plans for cath at this time.  -Per cards, could possibly switch coumadin to DOAC, but need to clarify.

## 2019-04-13 NOTE — PROGRESS NOTE ADULT - PROBLEM SELECTOR PLAN 3
-BP running low, but patient says he runs 90/60 at home.   -holding ace-I (lisinopril), spironolactone, imdur due to hypotension  -Will cautiously resume carvedilol at 3.125mg BID, per cards recs.

## 2019-04-13 NOTE — PROGRESS NOTE ADULT - SUBJECTIVE AND OBJECTIVE BOX
Patient is a 64y old  Male who presents with a chief complaint of SOB (13 Apr 2019 00:32)        SUBJECTIVE / OVERNIGHT EVENTS: Patient reports intermittent mild CP which comes and goes. He denies SOB. He reports some hand cramps.       MEDICATIONS  (STANDING):  aspirin enteric coated 81 milliGRAM(s) Oral daily  atorvastatin 80 milliGRAM(s) Oral at bedtime  buMETAnide Injectable 2 milliGRAM(s) IV Push every 12 hours  carvedilol 3.125 milliGRAM(s) Oral every 12 hours  digoxin     Tablet 0.125 milliGRAM(s) Oral daily  heparin  Infusion.  Unit(s)/Hr (10 mL/Hr) IV Continuous <Continuous>  ticagrelor 90 milliGRAM(s) Oral two times a day    MEDICATIONS  (PRN):  acetaminophen   Tablet .. 650 milliGRAM(s) Oral every 6 hours PRN Mild Pain (1 - 3), Moderate Pain (4 - 6), Severe Pain (7 - 10)  aluminum hydroxide/magnesium hydroxide/simethicone Suspension 30 milliLiter(s) Oral every 6 hours PRN Dyspepsia  heparin  Injectable 5000 Unit(s) IV Push every 6 hours PRN For aPTT less than 40      Vital Signs Last 24 Hrs  T(C): 36.6 (13 Apr 2019 21:08), Max: 36.7 (13 Apr 2019 14:47)  T(F): 97.9 (13 Apr 2019 21:08), Max: 98 (13 Apr 2019 14:47)  HR: 92 (13 Apr 2019 21:08) (73 - 92)  BP: 101/70 (13 Apr 2019 21:08) (92/64 - 101/70)  BP(mean): --  RR: 18 (13 Apr 2019 21:08) (18 - 18)  SpO2: 96% (13 Apr 2019 21:08) (92% - 96%)  CAPILLARY BLOOD GLUCOSE        I&O's Summary    12 Apr 2019 07:01  -  13 Apr 2019 07:00  --------------------------------------------------------  IN: 862 mL / OUT: 3050 mL / NET: -2188 mL          PHYSICAL EXAM:   GENERAL: NAD, well-developed  HEAD:  Atraumatic, Normocephalic  EYES: Conjunctiva and sclera clear  NECK: Supple  CHEST/LUNG: Clear to auscultation bilaterally; No wheeze  HEART: S1S2 normal. Regular rate and rhythm; No murmurs, rubs, or gallops  ABDOMEN: Soft, Nontender, Nondistended; Bowel sounds present  EXTREMITIES: No LE edema  PSYCH/Neuro: AAOx3. Non-focal.   SKIN: No rashes or lesions      LABS:                        16.0   11.7  )-----------( 266      ( 13 Apr 2019 06:36 )             48.4     04-13    129<L>  |  83<L>  |  32<H>  ----------------------------<  127<H>  3.8   |  28  |  0.74    Ca    9.4      13 Apr 2019 06:34    TPro  7.3  /  Alb  3.4  /  TBili  2.1<H>  /  DBili  x   /  AST  56<H>  /  ALT  57<H>  /  AlkPhos  100  < from: US Abdomen Complete (04.12.19 @ 09:21) >  IMPRESSION:     Redemonstration of scattered tiny echogenic foci in the liver which now   appear to be intraparenchymal and are of uncertain etiology.     An indeterminant left hepatic lobe lesion is again noted.     Cross-sectional imaging with contrast is recommended for further   evaluation.    < end of copied text >  04-13    PT/INR - ( 13 Apr 2019 07:06 )   PT: 13.1 sec;   INR: 1.13 ratio         PTT - ( 13 Apr 2019 07:06 )  PTT:58.0 sec      Telemetry reviewed by me: Sinus 's.     RADIOLOGY & ADDITIONAL TESTS:    Imaging Personally Reviewed: US abdomen report.   Consultant(s) Notes Reviewed:  Cardiology  Care Discussed with Consultants/Other Providers:

## 2019-04-14 VITALS — HEART RATE: 88 BPM

## 2019-04-14 LAB
ALBUMIN SERPL ELPH-MCNC: 0.5 G/DL — LOW (ref 3.3–5)
ALP SERPL-CCNC: 17 U/L — LOW (ref 40–120)
ALT FLD-CCNC: 11 U/L — SIGNIFICANT CHANGE UP (ref 10–45)
ANION GAP SERPL CALC-SCNC: 7 MMOL/L — SIGNIFICANT CHANGE UP (ref 5–17)
APTT BLD: >200 SEC — CRITICAL HIGH (ref 27.5–36.3)
AST SERPL-CCNC: 10 U/L — SIGNIFICANT CHANGE UP (ref 10–40)
BILIRUB SERPL-MCNC: 0.2 MG/DL — SIGNIFICANT CHANGE UP (ref 0.2–1.2)
BUN SERPL-MCNC: 9 MG/DL — SIGNIFICANT CHANGE UP (ref 7–23)
CALCIUM SERPL-MCNC: <3 MG/DL — CRITICAL LOW (ref 8.4–10.5)
CHLORIDE SERPL-SCNC: 135 MMOL/L — HIGH (ref 96–108)
CO2 SERPL-SCNC: 8 MMOL/L — CRITICAL LOW (ref 22–31)
CREAT SERPL-MCNC: <0.3 MG/DL — LOW (ref 0.5–1.3)
GLUCOSE SERPL-MCNC: 34 MG/DL — CRITICAL LOW (ref 70–99)
INR BLD: 3.27 RATIO — HIGH (ref 0.88–1.16)
MAGNESIUM SERPL-MCNC: <.6 MG/DL — CRITICAL LOW (ref 1.6–2.6)
PHOSPHATE SERPL-MCNC: <0.5 MG/DL — CRITICAL LOW (ref 2.5–4.5)
PLATELET # BLD AUTO: 82 K/UL — LOW (ref 150–400)
POTASSIUM SERPL-MCNC: <2 MMOL/L — CRITICAL LOW (ref 3.5–5.3)
POTASSIUM SERPL-SCNC: <2 MMOL/L — CRITICAL LOW (ref 3.5–5.3)
PROT SERPL-MCNC: 1.3 G/DL — LOW (ref 6–8.3)
PROTHROM AB SERPL-ACNC: 38.7 SEC — HIGH (ref 10–12.9)
SODIUM SERPL-SCNC: 150 MMOL/L — HIGH (ref 135–145)
TROPONIN T, HIGH SENSITIVITY RESULT: 283 NG/L — HIGH (ref 0–51)

## 2019-04-14 PROCEDURE — 84132 ASSAY OF SERUM POTASSIUM: CPT

## 2019-04-14 PROCEDURE — 84295 ASSAY OF SERUM SODIUM: CPT

## 2019-04-14 PROCEDURE — 84100 ASSAY OF PHOSPHORUS: CPT

## 2019-04-14 PROCEDURE — 85027 COMPLETE CBC AUTOMATED: CPT

## 2019-04-14 PROCEDURE — 83550 IRON BINDING TEST: CPT

## 2019-04-14 PROCEDURE — 93005 ELECTROCARDIOGRAM TRACING: CPT

## 2019-04-14 PROCEDURE — 83735 ASSAY OF MAGNESIUM: CPT

## 2019-04-14 PROCEDURE — 80076 HEPATIC FUNCTION PANEL: CPT

## 2019-04-14 PROCEDURE — 84484 ASSAY OF TROPONIN QUANT: CPT

## 2019-04-14 PROCEDURE — 83880 ASSAY OF NATRIURETIC PEPTIDE: CPT

## 2019-04-14 PROCEDURE — 82550 ASSAY OF CK (CPK): CPT

## 2019-04-14 PROCEDURE — 82435 ASSAY OF BLOOD CHLORIDE: CPT

## 2019-04-14 PROCEDURE — 83036 HEMOGLOBIN GLYCOSYLATED A1C: CPT

## 2019-04-14 PROCEDURE — 82553 CREATINE MB FRACTION: CPT

## 2019-04-14 PROCEDURE — 84550 ASSAY OF BLOOD/URIC ACID: CPT

## 2019-04-14 PROCEDURE — 82247 BILIRUBIN TOTAL: CPT

## 2019-04-14 PROCEDURE — 74177 CT ABD & PELVIS W/CONTRAST: CPT

## 2019-04-14 PROCEDURE — 36415 COLL VENOUS BLD VENIPUNCTURE: CPT

## 2019-04-14 PROCEDURE — C8929: CPT

## 2019-04-14 PROCEDURE — 82947 ASSAY GLUCOSE BLOOD QUANT: CPT

## 2019-04-14 PROCEDURE — C1769: CPT

## 2019-04-14 PROCEDURE — 85610 PROTHROMBIN TIME: CPT

## 2019-04-14 PROCEDURE — 85014 HEMATOCRIT: CPT

## 2019-04-14 PROCEDURE — 82962 GLUCOSE BLOOD TEST: CPT

## 2019-04-14 PROCEDURE — 85730 THROMBOPLASTIN TIME PARTIAL: CPT

## 2019-04-14 PROCEDURE — 94660 CPAP INITIATION&MGMT: CPT

## 2019-04-14 PROCEDURE — 86901 BLOOD TYPING SEROLOGIC RH(D): CPT

## 2019-04-14 PROCEDURE — 82565 ASSAY OF CREATININE: CPT

## 2019-04-14 PROCEDURE — 86850 RBC ANTIBODY SCREEN: CPT

## 2019-04-14 PROCEDURE — 99152 MOD SED SAME PHYS/QHP 5/>YRS: CPT

## 2019-04-14 PROCEDURE — 71045 X-RAY EXAM CHEST 1 VIEW: CPT

## 2019-04-14 PROCEDURE — 82248 BILIRUBIN DIRECT: CPT

## 2019-04-14 PROCEDURE — 82803 BLOOD GASES ANY COMBINATION: CPT

## 2019-04-14 PROCEDURE — 83010 ASSAY OF HAPTOGLOBIN QUANT: CPT

## 2019-04-14 PROCEDURE — 80074 ACUTE HEPATITIS PANEL: CPT

## 2019-04-14 PROCEDURE — C1894: CPT

## 2019-04-14 PROCEDURE — 82330 ASSAY OF CALCIUM: CPT

## 2019-04-14 PROCEDURE — 80053 COMPREHEN METABOLIC PANEL: CPT

## 2019-04-14 PROCEDURE — 84443 ASSAY THYROID STIM HORMONE: CPT

## 2019-04-14 PROCEDURE — C1887: CPT

## 2019-04-14 PROCEDURE — 83540 ASSAY OF IRON: CPT

## 2019-04-14 PROCEDURE — 76700 US EXAM ABDOM COMPLETE: CPT

## 2019-04-14 PROCEDURE — 76705 ECHO EXAM OF ABDOMEN: CPT

## 2019-04-14 PROCEDURE — 83615 LACTATE (LD) (LDH) ENZYME: CPT

## 2019-04-14 PROCEDURE — 83690 ASSAY OF LIPASE: CPT

## 2019-04-14 PROCEDURE — 80048 BASIC METABOLIC PNL TOTAL CA: CPT

## 2019-04-14 PROCEDURE — 31500 INSERT EMERGENCY AIRWAY: CPT

## 2019-04-14 PROCEDURE — 80061 LIPID PANEL: CPT

## 2019-04-14 PROCEDURE — 93458 L HRT ARTERY/VENTRICLE ANGIO: CPT

## 2019-04-14 PROCEDURE — 82728 ASSAY OF FERRITIN: CPT

## 2019-04-14 PROCEDURE — 99285 EMERGENCY DEPT VISIT HI MDM: CPT | Mod: 25

## 2019-04-14 PROCEDURE — 94002 VENT MGMT INPAT INIT DAY: CPT

## 2019-04-14 PROCEDURE — 71046 X-RAY EXAM CHEST 2 VIEWS: CPT

## 2019-04-14 PROCEDURE — 86900 BLOOD TYPING SEROLOGIC ABO: CPT

## 2019-04-14 PROCEDURE — 83605 ASSAY OF LACTIC ACID: CPT

## 2019-04-14 NOTE — CHART NOTE - NSCHARTNOTEFT_GEN_A_CORE
[address]    Notified by RN that patient needs an order to go downstairs for CT of abd around 10pm. Pt 's V/S stable and condition remains the same, no resp distress. Still on heparin drip for LV thrombosis.       Interventions taken  off tele for CT scan.  cont assess and monitor.                         Delores Auguste ANP-BC  Spectralink #[id number]

## 2019-04-14 NOTE — DISCHARGE NOTE FOR THE EXPIRED PATIENT - SECONDARY DIAGNOSIS.
CAD (coronary artery disease) Essential hypertension Moderate to severe pulmonary hypertension Left ventricular thrombus NSTEMI (non-ST elevated myocardial infarction) PAD (peripheral artery disease) Severe mitral regurgitation

## 2019-04-14 NOTE — DISCHARGE NOTE FOR THE EXPIRED PATIENT - HOSPITAL COURSE
64M w/ hx CHF  EF 20%, AICD [employer]  2014 , HTN, HLD, GERD, Rheumatoid arthritis, PAD, CAD/  MI w/ 9  stents, anterior STEMI c/b cardiogenic shock req restent LAD (in-stent thrombosis) and Impella (2011), hx of LV thrombus (On Coumadin) , 4/15 pt had NSTEMI with RODRIGO to pCX with Impella support.  Pt came to ER today with chest pain.  Pt was standing waiting to go to his EP doctors appointment when the pain started at 9AM .  Was associated with some SOB and inability to breathe in.  Symptoms have persisted til; now although they are waning in severity. EKG was negative for ST changes or T wave inversion, troponin elevated. Patient was taken for coronary angiogram  by Dr. Ferro. Cath via Right radial artery showed 80% occlusion to the osteal LAD, 100% occlusion of distal RCA and 50% occlusion, EDP 48. Lasix 40mg IV x 1 with plans to start a gtt and transferred to [facility]. Due to ADHF pt not revascularized during cardiac catheterization.      an RRT was called for respiratory distress. Pt hypertensive, likely secondary to flash pulmonary edema.  Diffuse crackles. Bipap ordered. Patient given additional 80mg IV Lasix x 1, gtt started. Pillai placed.  BP initially in 150s, rapidly increasing to the 180s.  Nitro gtt started for BP control per Dr. Bravo. Patient with slight decrease in respiratory distress. Patient alert and oriented throughout. Plan to continue bipap and diuresis and transfer to CCU.    While waiting for CCU bed, patient became hypotensive to the 70s.  Briefly held NTG without an increase in SBP.  Lasix held until SBP in 80s, then restarted.  SBP slowly uptrending to 120 and restarted NTG. BIPAP settings optimized, achieving VT > 500. however, no improvement noted in respiratory effort. Patient becoming increasingly diaphoretic, remaining tachypnea to the 40s.  Multiple attempts to get ABG unsuccessful (one wrist with radial band still in place).  Dr. Bravo at bedside to re-eval patient.  Bumex 2mg IV x 1 given. BIPAP continued attempting to avoid intubation at this time.  CXR done showing diffuse pulmonary edema. Pillai changed in case of possible obstruction, though flushing easily. 4/10 pt improved with aggressive diuresis. Transferred out of the CCU on a Bumex gtt, breathing comfortably. Overnight prior to transfer he was started on heparin gtt for LV thrombus.     On floors pt with hyperbilirubinemia, abd us done showing redemonstration of scattered tiny echogenic foci in the liver which now appear to be intraparenchymal and are of uncertain etiology. Cross-sectional imaging with contrast is recommended for further evaluation. Bumex gtt d/c now on IV bumex bid. - CT abd to eval liver lesions with elevated LFT. Upon arrival to the CT area the patient was sitting in a wheel chair on a Heparin infusion. Heparin gtt was stopped.     Patient was seen and examined at the bedside by the rapid response team.  Per the patient he was ambulating to the bathroom to void then fell and hit his head. The patient was able to follow all commands denied any visual changes, and Headache, or notable facial droop and slurred speech  with 5/5 muscle strength.   (+) c/o 5/10 chest pain. The patient was placed on to a stretcher. 12 lead ekg with no ST depression/or elevation. Unable to obtain a NIBP.  Attempts  made for manual B/P, however unable to obtain. At that time during the rapid the decision was made for a 250ml NS bolus. The patient was transferred back to [facility] for further management. while on 3 dsu the patient remained with out manual B/P or NIBP reading Levophed gtt was initiated at .5 then titrated to 1mg. Phenylephrine gtt was also placed on maximum dose. The patient was quickly transferred to the MICU as a CCU border. The Cardiology fellow was made aware of all events. While in the MICU the patients clinical status grossly declined Vasopressin was added and the patient was ultimately intubated and sustained a Vtach arrest ACLS was performed Cardiology Team and MICU team at bedside. Despite all efforts to the patient .

## 2019-04-14 NOTE — CHART NOTE - NSCHARTNOTEFT_GEN_A_CORE
Rapid Response NP Note  64M w/ hx CHF  EF 20%, AICD [employer]  2014 , HTN, HLD, GERD, Rheumatoid arthritis, PAD, CAD/  MI w/ 9  stents, anterior STEMI c/b cardiogenic shock requiring restenting to LAD (in-stent thrombosis) and Impella (9/11/2011), hx of LV thrombus (On Coumadin) , 4/15 pt had NSTEMI with RODRIGO to pCX with Impella support admitted for NSTEMI w/ cath showing 80%osteal LAD, 100% dRCA, 50% prox circ, LVEDP 48 course c/b hypertensive emergency leading to flash pulmonary edema, now in acute on chronic decompensated HF with recent discharge from CCU.   Rapid response team called 4/13 after the patient sustained a mechanical fall in the CT scan area. Upon arrival to the CT area the patient was sitting in a wheel chair on a Heparin infusion. Heparin gtt was stopped.     Patient was seen and examined at the bedside by the rapid response team.  Per the patient he was ambulating to the bathroom to void then fell and hit his head. The patient was able to follow all commands denied any visual changes, Headache, with 5/5 muscle strength.   (+) c/o 5/10 chest pain. The patient was placed on to a stretcher. 12 lead ekg with no ST depression/or elevation. Unable to obtain a NIBP.  Attempts  made for manual B/P, however unable to obtain. At that time during the rapid the decision was made for a 250ml NS bolus. The patient was transferred back to [facility] for further management. while on 3 dsu the patient remained with out manual B/P or NIBP reading Levophed gtt was initiated at .5 then titrated to 1mg. Phenylephrine gtt was also placed on maximum dose. The patient was quickly transferred to the MICU as a CCU border. The Cardiology fellow was  made aware of all events. While in the MICU the patients clinical status grossly declined Vasopressin was added and the patient was ultimately intubated    Allergies    penicillin (Rash)    Intolerances        PAST MEDICAL & SURGICAL HISTORY:  Rheumatoid arthritis  Systolic heart failure: EF 29% via TTE 4/15  Severe mitral regurgitation  Moderate to severe pulmonary hypertension  Left ventricular thrombus  Joint pain, foot: in toes  GERD (gastroesophageal reflux disease)  Chronic systolic congestive heart failure: EF15%-35%  PAD (peripheral artery disease)  Intermittent claudication  STEMI (ST elevation myocardial infarction): AWMI with cardiogenic shock 9/11/11- with emergent stent to LAD/impella at [facility]  H/O hyperlipidemia  HTN (hypertension)  CAD (coronary artery disease)  AICD (automatic cardioverter/defibrillator) present: EF 29%  S/P angioplasty with stent: 2008 - 5 stents [facility]  2011 - LAD stent with cardiogenic shock Impella assisted  4/2015 - pLAD RODRIGO/POBA and pCx RODRIGO x 1  Testicular torsion: bilateral testes present  Accommodative strabismus      Vital Signs Last 24 Hrs  T(C): 36.6 (13 Apr 2019 21:08), Max: 36.7 (13 Apr 2019 14:47)  T(F): 97.9 (13 Apr 2019 21:08), Max: 98 (13 Apr 2019 14:47)  HR: 88 (14 Apr 2019 00:51) (73 - 135)  BP: 154/125 (14 Apr 2019 00:26) (92/64 - 154/125)  BP(mean): 0 (14 Apr 2019 00:45) (0 - 132)  RR: 132 (14 Apr 2019 00:18) (18 - 132)  SpO2: 60% (14 Apr 2019 00:45) (60% - 100%)          GENERAL: The patient is awake and alert in no apparent distress.   HEENT: Head is normocephalic and atraumatic. Extraocular muscles are intact. Mucous membranes are moist. No throat erythema/exudates no lymphadenopathy, no JVD,   NECK: Supple.  LUNGS: Clear to auscultation BL without wheezing, rales or rhonchi; respirations unlabored  HEART: Regular rate and rhythm ,+S1/+S2, no murmurs, rubs, gallops  ABDOMEN: Soft, nontender, and nondistended, no rebound, guarding rigidity, bowel sounds in all 4 quadrants  EXTREMITIES: Without any cyanosis, clubbing, rash, lesions or edema.  SKIN: No new rashes or lesions.  MSK: strength equal BL  VASCULAR: Radial and Dorsal pedal pulses palpable BL  NEUROLOGIC: Grossly intact.  PSYCH: No new changes.    04-12 @ 07:01  -  04-13 @ 07:00  --------------------------------------------------------  IN: 862 mL / OUT: 3050 mL / NET: -2188 mL                              4.7    4.6   )-----------( 82       ( 13 Apr 2019 23:24 )             13.9     04-13    150<H>  |  135<H>  |  9   ----------------------------<  34<LL>  <2.0<LL>   |  8<LL>  |  <0.30<L>    Ca    <3.0<LL>      13 Apr 2019 23:24  Phos  <0.5     04-13  Mg     <.6     04-13    TPro  1.3<L>  /  Alb  0.5<L>  /  TBili  0.2  /  DBili  x   /  AST  10  /  ALT  11  /  AlkPhos  17<L>  04-13         LIVER FUNCTIONS - ( 13 Apr 2019 23:24 )  Alb: 0.5 g/dL / Pro: 1.3 g/dL / ALK PHOS: 17 U/L / ALT: 11 U/L / AST: 10 U/L / GGT: x              PT/INR - ( 13 Apr 2019 23:24 )   PT: 38.7 sec;   INR: 3.27 ratio         PTT - ( 13 Apr 2019 23:24 )  PTT:>200.0 sec    Vital Signs Last 24 Hrs*       Assessment- Rapid Response called for 64y year old Male with a past medical history of     Francisco Dye [facility] Rapid Response NP Note  64M w/ hx CHF  EF 20%, AICD [employer]  2014 , HTN, HLD, GERD, Rheumatoid arthritis, PAD, CAD/  MI w/ 9  stents, anterior STEMI c/b cardiogenic shock requiring restenting to LAD (in-stent thrombosis) and Impella (2011), hx of LV thrombus (On Coumadin) , 4/15 pt had NSTEMI with RODRIGO to pCX with Impella support admitted for NSTEMI w/ cath showing 80%osteal LAD, 100% dRCA, 50% prox circ, LVEDP 48 course c/b hypertensive emergency leading to flash pulmonary edema, now in acute on chronic decompensated HF with recent discharge from CCU.   Rapid response team called  after the patient sustained a mechanical fall in the CT scan area. Upon arrival to the CT area the patient was sitting in a wheel chair on a Heparin infusion. Heparin gtt was stopped.     Patient was seen and examined at the bedside by the rapid response team.  Per the patient he was ambulating to the bathroom to void then fell and hit his head. The patient was able to follow all commands denied any visual changes, Headache, with 5/5 muscle strength.   (+) c/o 5/10 chest pain. The patient was placed on to a stretcher. 12 lead ekg with no ST depression/or elevation. Unable to obtain a NIBP.  Attempts  made for manual B/P, however unable to obtain. At that time during the rapid the decision was made for a 250ml NS bolus. The patient was transferred back to [facility] for further management. while on 3 dsu the patient remained with out manual B/P or NIBP reading Levophed gtt was initiated at .5 then titrated to 1mg. Phenylephrine gtt was also placed on maximum dose. The patient was quickly transferred to the MICU as a CCU border. The Cardiology fellow was  made aware of all events. While in the MICU the patients clinical status grossly declined Vasopressin was added and the patient was ultimately intubated and sustained a Vtach arrest ACLS was performed Cardiology Team and MICU team at bedside. Despite all efforts to the patient .     Allergies    penicillin (Rash)    Intolerances        PAST MEDICAL & SURGICAL HISTORY:  Rheumatoid arthritis  Systolic heart failure: EF 29% via TTE 4/15  Severe mitral regurgitation  Moderate to severe pulmonary hypertension  Left ventricular thrombus  Joint pain, foot: in toes  GERD (gastroesophageal reflux disease)  Chronic systolic congestive heart failure: EF15%-35%  PAD (peripheral artery disease)  Intermittent claudication  STEMI (ST elevation myocardial infarction): AWMI with cardiogenic shock 11- with emergent stent to LAD/impella at [facility]  H/O hyperlipidemia  HTN (hypertension)  CAD (coronary artery disease)  AICD (automatic cardioverter/defibrillator) present: EF 29%  S/P angioplasty with stent:  - 5 stents [facility]   - LAD stent with cardiogenic shock Impella assisted  2015 - pLAD RODRIGO/POBA and pCx RODRIGO x 1  Testicular torsion: bilateral testes present  Accommodative strabismus      Vital Signs Last 24 Hrs  T(C): 36.6 (2019 21:08), Max: 36.7 (2019 14:47)  T(F): 97.9 (2019 21:08), Max: 98 (2019 14:47)  HR: 88 (2019 00:51) (73 - 135)  BP: 154/125 (2019 00:26) (92/64 - 154/125)  BP(mean): 0 (2019 00:45) (0 - 132)  RR: 132 (2019 00:18) (18 - 132)  SpO2: 60% (2019 00:45) (60% - 100%)        --------------------------------------------------------  IN: 862 mL / OUT: 3050 mL / NET: -2188 mL                                    Flor Dye [facility] Rapid Response NP Note  64M w/ hx CHF  EF 20%, AICD [employer]  2014 , HTN, HLD, GERD, Rheumatoid arthritis, PAD, CAD/  MI w/ 9  stents, anterior STEMI c/b cardiogenic shock requiring restenting to LAD (in-stent thrombosis) and Impella (2011), hx of LV thrombus (On Coumadin) , 4/15 pt had NSTEMI with RODRIGO to pCX with Impella support admitted for NSTEMI w/ cath showing 80%osteal LAD, 100% dRCA, 50% prox circ, LVEDP 48 course c/b hypertensive emergency leading to flash pulmonary edema, now in acute on chronic decompensated HF with recent discharge from CCU.   Rapid response team called  after the patient sustained a mechanical fall in the CT scan area. Upon arrival to the CT area the patient was sitting in a wheel chair on a Heparin infusion. Heparin gtt was stopped.     Patient was seen and examined at the bedside by the rapid response team.  Per the patient he was ambulating to the bathroom to void then fell and hit his head. The patient was able to follow all commands denied any visual changes, and Headache, or notable facial droop and slurred speech  with 5/5 muscle strength.   (+) c/o 5/10 chest pain. The patient was placed on to a stretcher. 12 lead ekg with no ST depression/or elevation. Unable to obtain a NIBP.  Attempts  made for manual B/P, however unable to obtain. At that time during the rapid the decision was made for a 250ml NS bolus. The patient was transferred back to [facility] for further management. while on 3 dsu the patient remained with out manual B/P or NIBP reading Levophed gtt was initiated at .5 then titrated to 1mg. Phenylephrine gtt was also placed on maximum dose. The patient was quickly transferred to the MICU as a CCU border. The Cardiology fellow was  made aware of all events. While in the MICU the patients clinical status grossly declined Vasopressin was added and the patient was ultimately intubated and sustained a Vtach arrest ACLS was performed Cardiology Team and MICU team at bedside. Despite all efforts to the patient .     Allergies    penicillin (Rash)    Intolerances        PAST MEDICAL & SURGICAL HISTORY:  Rheumatoid arthritis  Systolic heart failure: EF 29% via TTE 4/15  Severe mitral regurgitation  Moderate to severe pulmonary hypertension  Left ventricular thrombus  Joint pain, foot: in toes  GERD (gastroesophageal reflux disease)  Chronic systolic congestive heart failure: EF15%-35%  PAD (peripheral artery disease)  Intermittent claudication  STEMI (ST elevation myocardial infarction): AWMI with cardiogenic shock 11- with emergent stent to LAD/impella at [facility]  H/O hyperlipidemia  HTN (hypertension)  CAD (coronary artery disease)  AICD (automatic cardioverter/defibrillator) present: EF 29%  S/P angioplasty with stent:  - 5 stents [facility]   - LAD stent with cardiogenic shock Impella assisted  2015 - pLAD RODRIGO/POBA and pCx RODRIGO x 1  Testicular torsion: bilateral testes present  Accommodative strabismus      Vital Signs Last 24 Hrs  T(C): 36.6 (2019 21:08), Max: 36.7 (2019 14:47)  T(F): 97.9 (2019 21:08), Max: 98 (2019 14:47)  HR: 88 (2019 00:51) (73 - 135)  BP: 154/125 (2019 00:26) (92/64 - 154/125)  BP(mean): 0 (2019 00:45) (0 - 132)  RR: 132 (2019 00:18) (18 - 132)  SpO2: 60% (2019 00:45) (60% - 100%)        --------------------------------------------------------  IN: 862 mL / OUT: 3050 mL / NET: -2188 mL                                    Flor Dye [facility]

## 2019-04-14 NOTE — AIRWAY PLACEMENT NOTE ADULT - AIRWAY COMMENTS:
Emergent airway placed for patient. Dr. Selina Aguilar present for procedure. Pt placed on mechanical ventilator

## 2019-04-14 NOTE — AIRWAY PLACEMENT NOTE ADULT - POST AIRWAY PLACEMENT ASSESSMENT:
chest excursion noted/breath sounds bilateral/breath sounds equal/positive end tidal CO2 noted/CXR pending

## 2019-09-16 NOTE — ED ADULT NURSE NOTE - NSSUSCREENINGQ4_ED_ALL_ED
Prior Authorization Approval    Authorization Effective Date: 9/16/2019  Authorization Expiration Date: 9/12/2021  Medication: HUMIRA - Approved  Approved Dose/Quantity:  2 for 28 days  Reference #: [id number]   Insurance Company: [employer] [phone] Fax [phone]  Expected CoPay:       CoPay Card Available: Yes    Foundation Assistance Needed:    Which Pharmacy is filling the prescription (Not needed for infusion/clinic administered): [facility]  Pharmacy Notified: Yes - rx released  Patient Notified: Yes - via [facility]                   No

## 2020-08-24 NOTE — PROGRESS NOTE ADULT - ASSESSMENT
[place] with cpx and mri in 1 year
64M h/o CAD s/p multiple stents, CHF EF 20% s/p AICD, RA p/w CP found to have NSTEMI s/p LHC with oLAD 80%, pCx 50%, dRCA 100% c/b flash pulmonary edema improving with IV diuresis with plans to have PCI of pLAD once euvolemic
64 year old M w/ pmh of chronic systolic HF (EF 20%) w/ AICD , PAD, CAD w/ 9 stents and impella x 2 who p/w NSTEMI found to have CAD c/b AdHF and flash pulm edema now diuresing well on Bumex gtt    NSTEMI with acute on chronic decompensated heart failure  c/w diuresis  ICD interrogation with no significant findings  once euvolemic will pursue with cardiac cath.     Ariadne Reddy PGY-2  Pager # [phone]/ [phone]
65 y/o M w/ hx as above admitted with chest pain complicated by acute on chronic decompensated systolic heart failure.
65 y/o M w/ hx as above admitted with chest pain complicated by acute on chronic decompensated systolic heart failure.
64 year old male with PMH of CAD s/p multiple stents, CHF EF 20% s/p AICD, RA; presented with CP found to have NSTEMI s/p LHC with oLAD 80%, pCx 50%, dRCA 100% c/b flash pulmonary edema improving with IV diuresis. Also noted to have hyperbilirubinemia and liver lesions on US abdomen.
64 year old M w/ pmh of chronic systolic HF (EF 20%) w/ AICD , PAD, CAD w/ 9 stents and impella x 2 who p/w NSTEMI found to have CAD c/b AdHF and flash pulm edema now diuresing well on Bumex gtt

## 2021-01-05 NOTE — PROGRESS NOTE ADULT - PROBLEM SELECTOR PLAN 1
- continue ASA 81mg daily, Brilinta 90mg BID, atorvastatin 80mg qhs  - no afterload reduction at this time given hypotension  - trend Tiffanie to peak  -Cardiac cath for PCI once euvolemic. 05-Jan-2021 16:50

## 2021-09-26 NOTE — ED ADULT NURSE NOTE - NSWEIGHTCALCTOOLDRUG_GEN_A_CORE
You can access the FollowMyHealth Patient Portal offered by [facility] by registering at the following website: [email or web address]. By joining [employer]’s FollowMyHealth portal, you will also be able to view your health information using other applications (apps) compatible with our system.  used

## 2023-07-19 NOTE — H&P CARDIOLOGY - GASTROINTESTINAL
Occupational Therapy    Patient not seen in therapy.     Unavailable due to request no therapy today.      Re-attempt plan: per established plan of care    Attempted patient in AM. Patient declining any out of bed activity at this time despite max encouragement. Will continue OT efforts as able.     ADDENDUM: Attempted patient in PM. Patient continuing to decline any activity with writer due to fatigue. Will continue OT efforts per established plan of care.       OBJECTIVE                       Documented in the chart in the following areas: Assessment/Plan.      Therapy procedure time and total treatment time can be found documented on the Time Entry flowsheet   Soft, non-tender, no hepatosplenomegaly, normal bowel sounds

## 2023-08-14 NOTE — PATIENT PROFILE ADULT - NSASFALLNEEDSASSIST_GEN_A_NUR
Left message for patient to call office to review sleep study results. Needs to schedule DME set up and compliance follow up in the sleep center. no

## 2024-06-06 NOTE — PATIENT PROFILE ADULT - NSPROSPIRITUALVALUESFT_GEN_A_NUR
Schedule an appointment and follow-up with Sita Larson NP of psychiatry by calling [phone].    Do not take ibuprofen, naproxen, or aspirin prior to surgery.  Stop any vitamins or supplements prior to surgery.  You can take your usual medications before surgery.   none

## 2024-10-03 NOTE — DISCHARGE NOTE ADULT - THE PATIENT HAS
Problem: Adult Inpatient Plan of Care  Goal: Plan of Care Review  Outcome: Progressing  Goal: Patient-Specific Goal (Individualized)  Outcome: Progressing  Goal: Absence of Hospital-Acquired Illness or Injury  Outcome: Progressing  Goal: Optimal Comfort and Wellbeing  Outcome: Progressing  Goal: Readiness for Transition of Care  Outcome: Progressing     Problem: Diabetes Comorbidity  Goal: Blood Glucose Level Within Targeted Range  Outcome: Progressing     Problem: Acute Kidney Injury/Impairment  Goal: Fluid and Electrolyte Balance  Outcome: Progressing  Goal: Improved Oral Intake  Outcome: Progressing  Goal: Effective Renal Function  Outcome: Progressing     Problem: Wound  Goal: Optimal Coping  Outcome: Progressing  Goal: Optimal Functional Ability  Outcome: Progressing  Goal: Absence of Infection Signs and Symptoms  Outcome: Progressing  Goal: Improved Oral Intake  Outcome: Progressing  Goal: Optimal Pain Control and Function  Outcome: Progressing  Goal: Skin Health and Integrity  Outcome: Progressing  Goal: Optimal Wound Healing  Outcome: Progressing     Problem: Skin Injury Risk Increased  Goal: Skin Health and Integrity  Outcome: Progressing     Problem: Fall Injury Risk  Goal: Absence of Fall and Fall-Related Injury  Outcome: Progressing         Plan of care reviewed with patient/ @ bedside      Patient is AOX4 / NAD     Patient remained free of falls and trauma, fall precautions are in place.    Patient is bed bound/rolling with assistance    Patient reports headache 3/10 PRN Tylenol given with good results.      Patients care plan and medication discussed with patient/..     Patient has no questions at this time/ verbalized understanding.     Bed is in low position and wheels are locked for patient safety.      The call light is within pt's reach.     Telemetry leads placed/battery replaced and transmitting.     Pt remains free of falls, injury, and trauma.             no difficulties